# Patient Record
Sex: FEMALE | Race: WHITE | NOT HISPANIC OR LATINO | Employment: FULL TIME | URBAN - METROPOLITAN AREA
[De-identification: names, ages, dates, MRNs, and addresses within clinical notes are randomized per-mention and may not be internally consistent; named-entity substitution may affect disease eponyms.]

---

## 2018-04-24 ENCOUNTER — OFFICE VISIT (OUTPATIENT)
Dept: GASTROENTEROLOGY | Facility: CLINIC | Age: 48
End: 2018-04-24

## 2018-04-24 VITALS
WEIGHT: 118 LBS | SYSTOLIC BLOOD PRESSURE: 124 MMHG | BODY MASS INDEX: 21.71 KG/M2 | TEMPERATURE: 98.7 F | HEIGHT: 62 IN | DIASTOLIC BLOOD PRESSURE: 72 MMHG

## 2018-04-24 DIAGNOSIS — K58.0 IRRITABLE BOWEL SYNDROME WITH DIARRHEA: ICD-10-CM

## 2018-04-24 DIAGNOSIS — K52.9 CHRONIC DIARRHEA: Primary | ICD-10-CM

## 2018-04-24 PROCEDURE — 99203 OFFICE O/P NEW LOW 30 MIN: CPT | Performed by: INTERNAL MEDICINE

## 2018-04-24 RX ORDER — HYDROXYZINE HYDROCHLORIDE 25 MG/1
TABLET, FILM COATED ORAL
COMMUNITY
Start: 2018-03-12 | End: 2018-04-24

## 2018-04-24 RX ORDER — CETIRIZINE HYDROCHLORIDE 10 MG/1
10 TABLET ORAL DAILY
COMMUNITY
End: 2018-07-02

## 2018-04-24 RX ORDER — MELATONIN 200 MCG
TABLET ORAL
COMMUNITY
End: 2018-07-02

## 2018-04-24 RX ORDER — BACLOFEN 20 MG/1
10 TABLET ORAL NIGHTLY
COMMUNITY
Start: 2018-03-12 | End: 2018-08-16 | Stop reason: HOSPADM

## 2018-04-24 RX ORDER — CHLORAL HYDRATE 500 MG
CAPSULE ORAL
COMMUNITY
End: 2018-07-02

## 2018-04-24 RX ORDER — HYOSCYAMINE SULFATE 0.125 MG
0.12 TABLET ORAL EVERY 4 HOURS PRN
COMMUNITY
End: 2018-06-19 | Stop reason: HOSPADM

## 2018-04-24 NOTE — PROGRESS NOTES
Chief Complaint   Patient presents with   • Abdominal Pain   • Diarrhea   • Nausea     Nicki Fowler is a 47 y.o. female who presents with a History of chronic diarrhea   HPI     47-year-old female with history of seasonal allergies and diagnosis of IBS for 20 years.  Patient reports an first evaluated 20 years ago with chronic diarrhea patient had stool sent and when they turned out negative was told she had IBS.  Patient is been dealing with symptoms of gas, bloating urgency and diarrhea ever since.  Patient reports no specific diet seems to help.  Patient reports anything she eats may or may not cause the symptoms to occur to the point she can become incontinent when the response is extreme.  Patient will often have episodes of frequent urgency and will have to leave event that she's attending due to these symptoms.  Patient does raise 3 children and feels its time that she starts taking care of herself and came in today for evaluation.  Patient denies any significant weight loss though is thin at the start.    Past Medical History:   Diagnosis Date   • History of seasonal allergies        Current Outpatient Prescriptions:   •  baclofen (LIORESAL) 20 MG tablet, 10 mg., Disp: , Rfl:   •  Calcium-Magnesium-Vitamin D (CALCIUM MAGNESIUM PO), Take  by mouth., Disp: , Rfl:   •  cetirizine (zyrTEC) 10 MG tablet, Take 10 mg by mouth Daily., Disp: , Rfl:   •  FIBER PO, Take  by mouth., Disp: , Rfl:   •  hyoscyamine (ANASPAZ,LEVSIN) 0.125 MG tablet, Take 0.125 mg by mouth Every 4 (Four) Hours As Needed for Cramping., Disp: , Rfl:   •  Melatonin 3 MG tablet, Take  by mouth., Disp: , Rfl:   •  Omega-3 Fatty Acids (FISH OIL) 1000 MG capsule capsule, Take  by mouth Daily With Breakfast., Disp: , Rfl:   •  magnesium chloride ER (MAG-DELAY) 535 (64 Mg) MG CR tablet, Take  by mouth Daily., Disp: , Rfl:   •  rifaximin (XIFAXAN) 550 MG tablet, Take 1 tablet by mouth Every 8 (Eight) Hours., Disp: 42 tablet, Rfl: 2  Allergies    Allergen Reactions   • Shellfish-Derived Products Hives     Social History     Social History   • Marital status:      Spouse name: N/A   • Number of children: N/A   • Years of education: N/A     Occupational History   • Not on file.     Social History Main Topics   • Smoking status: Former Smoker     Packs/day: 2.00     Types: Cigarettes     Start date: 1983     Quit date: 2008   • Smokeless tobacco: Never Used   • Alcohol use Yes      Comment: social   • Drug use: No   • Sexual activity: Not on file     Other Topics Concern   • Not on file     Social History Narrative   • No narrative on file     No family history on file.  Review of Systems   Constitutional: Negative.    HENT: Negative.    Eyes: Negative.    Respiratory: Negative.    Cardiovascular: Negative.    Gastrointestinal: Positive for abdominal pain, diarrhea and nausea. Negative for abdominal distention, anal bleeding, blood in stool, constipation, rectal pain and vomiting.   Endocrine: Negative.    Musculoskeletal: Negative.    Allergic/Immunologic: Negative.    Hematological: Negative.      Vitals:    04/24/18 1141   BP: 124/72   Temp: 98.7 °F (37.1 °C)     Physical Exam   Constitutional: She is oriented to person, place, and time. She appears well-developed and well-nourished.   HENT:   Head: Normocephalic and atraumatic.   Eyes: Pupils are equal, round, and reactive to light. No scleral icterus.   Cardiovascular: Normal rate, regular rhythm and normal heart sounds.  Exam reveals no gallop and no friction rub.    No murmur heard.  Pulmonary/Chest: Effort normal and breath sounds normal. She has no wheezes. She has no rales.   Abdominal: Soft. Bowel sounds are normal. She exhibits no shifting dullness, no distension, no pulsatile liver, no fluid wave, no abdominal bruit, no ascites, no pulsatile midline mass and no mass. There is no hepatosplenomegaly. There is no tenderness. There is no rigidity and no guarding. No hernia.   Musculoskeletal:  Normal range of motion. She exhibits no edema.   Neurological: She is alert and oriented to person, place, and time. No cranial nerve deficit.   Skin: Skin is warm and dry. No rash noted.   Psychiatric: She has a normal mood and affect. Her behavior is normal. Thought content normal.   Nursing note and vitals reviewed.    Diagnoses and all orders for this visit:    Chronic diarrhea  -     Case Request; Standing  -     Case Request    Irritable bowel syndrome with diarrhea    Other orders  -     baclofen (LIORESAL) 20 MG tablet; 10 mg.  -     Discontinue: hydrOXYzine (ATARAX) 25 MG tablet;   -     hyoscyamine (ANASPAZ,LEVSIN) 0.125 MG tablet; Take 0.125 mg by mouth Every 4 (Four) Hours As Needed for Cramping.  -     cetirizine (zyrTEC) 10 MG tablet; Take 10 mg by mouth Daily.  -     FIBER PO; Take  by mouth.  -     Omega-3 Fatty Acids (FISH OIL) 1000 MG capsule capsule; Take  by mouth Daily With Breakfast.  -     magnesium chloride ER (MAG-DELAY) 535 (64 Mg) MG CR tablet; Take  by mouth Daily.  -     Melatonin 3 MG tablet; Take  by mouth.  -     Calcium-Magnesium-Vitamin D (CALCIUM MAGNESIUM PO); Take  by mouth.  -     Implement Anesthesia orders day of procedure.; Standing  -     Obtain informed consent; Standing  -     rifaximin (XIFAXAN) 550 MG tablet; Take 1 tablet by mouth Every 8 (Eight) Hours.    Patient 47-year-old female with history of chronic diarrhea for greater than 20 years.  Patient reports tested for stool for her chronic diarrhea 20 years ago and was told that was normal therefore she had IBS and is been dealing with the symptoms ever since.  Patient with urgency and loose to runny stools including rare incontinence.  Patient will occasionally gain some control with diet and probiotics but then ended up with accidents.  Patient now here for further recommendations.  At this point would recommend patient undergo colonoscopy for biopsies rule out microscopic colitis.  We'll also give trial Xifaxan to  see if that will resolve her symptoms for prolonged treatment.  If 14 days of therapy did not help will go to more traditional IBS therapy.  We'll follow-up clinically based on findings.

## 2018-04-30 ENCOUNTER — HOSPITAL ENCOUNTER (OUTPATIENT)
Facility: HOSPITAL | Age: 48
Setting detail: HOSPITAL OUTPATIENT SURGERY
Discharge: HOME | End: 2018-04-30
Attending: INTERNAL MEDICINE | Admitting: INTERNAL MEDICINE

## 2018-04-30 ENCOUNTER — ANESTHESIA (OUTPATIENT)
Dept: GASTROENTEROLOGY | Facility: HOSPITAL | Age: 48
End: 2018-04-30

## 2018-04-30 ENCOUNTER — ANESTHESIA EVENT (OUTPATIENT)
Dept: GASTROENTEROLOGY | Facility: HOSPITAL | Age: 48
End: 2018-04-30

## 2018-04-30 VITALS
WEIGHT: 117.25 LBS | HEART RATE: 56 BPM | OXYGEN SATURATION: 98 % | DIASTOLIC BLOOD PRESSURE: 71 MMHG | BODY MASS INDEX: 21.45 KG/M2 | TEMPERATURE: 97.7 F | RESPIRATION RATE: 16 BRPM | SYSTOLIC BLOOD PRESSURE: 109 MMHG

## 2018-04-30 DIAGNOSIS — K52.9 CHRONIC DIARRHEA: ICD-10-CM

## 2018-04-30 LAB
B-HCG UR QL: NEGATIVE
INTERNAL NEGATIVE CONTROL: NEGATIVE
INTERNAL POSITIVE CONTROL: POSITIVE
Lab: NORMAL

## 2018-04-30 PROCEDURE — 25010000002 PROPOFOL 10 MG/ML EMULSION: Performed by: NURSE ANESTHETIST, CERTIFIED REGISTERED

## 2018-04-30 PROCEDURE — 45380 COLONOSCOPY AND BIOPSY: CPT | Performed by: INTERNAL MEDICINE

## 2018-04-30 PROCEDURE — 88305 TISSUE EXAM BY PATHOLOGIST: CPT | Performed by: INTERNAL MEDICINE

## 2018-04-30 PROCEDURE — 45385 COLONOSCOPY W/LESION REMOVAL: CPT | Performed by: INTERNAL MEDICINE

## 2018-04-30 RX ORDER — HYDROXYZINE PAMOATE 25 MG/1
12.5 CAPSULE ORAL 3 TIMES DAILY PRN
COMMUNITY
End: 2018-07-02

## 2018-04-30 RX ORDER — SODIUM CHLORIDE, SODIUM LACTATE, POTASSIUM CHLORIDE, CALCIUM CHLORIDE 600; 310; 30; 20 MG/100ML; MG/100ML; MG/100ML; MG/100ML
1000 INJECTION, SOLUTION INTRAVENOUS CONTINUOUS
Status: DISCONTINUED | OUTPATIENT
Start: 2018-04-30 | End: 2018-04-30 | Stop reason: HOSPADM

## 2018-04-30 RX ORDER — LIDOCAINE HYDROCHLORIDE 10 MG/ML
INJECTION, SOLUTION INFILTRATION; PERINEURAL AS NEEDED
Status: DISCONTINUED | OUTPATIENT
Start: 2018-04-30 | End: 2018-04-30 | Stop reason: SURG

## 2018-04-30 RX ORDER — PROPOFOL 10 MG/ML
VIAL (ML) INTRAVENOUS AS NEEDED
Status: DISCONTINUED | OUTPATIENT
Start: 2018-04-30 | End: 2018-04-30 | Stop reason: SURG

## 2018-04-30 RX ORDER — PROPOFOL 10 MG/ML
VIAL (ML) INTRAVENOUS CONTINUOUS PRN
Status: DISCONTINUED | OUTPATIENT
Start: 2018-04-30 | End: 2018-04-30 | Stop reason: SURG

## 2018-04-30 RX ADMIN — SODIUM CHLORIDE, POTASSIUM CHLORIDE, SODIUM LACTATE AND CALCIUM CHLORIDE 1000 ML: 600; 310; 30; 20 INJECTION, SOLUTION INTRAVENOUS at 08:47

## 2018-04-30 RX ADMIN — PROPOFOL 80 MG: 10 INJECTION, EMULSION INTRAVENOUS at 09:41

## 2018-04-30 RX ADMIN — LIDOCAINE HYDROCHLORIDE 60 MG: 10 INJECTION, SOLUTION INFILTRATION; PERINEURAL at 09:41

## 2018-04-30 RX ADMIN — PROPOFOL 200 MCG/KG/MIN: 10 INJECTION, EMULSION INTRAVENOUS at 09:41

## 2018-04-30 NOTE — ANESTHESIA PREPROCEDURE EVALUATION
Anesthesia Evaluation     Patient summary reviewed and Nursing notes reviewed   NPO Solid Status: > 8 hours  NPO Liquid Status: > 8 hours           Airway   Mallampati: I  TM distance: >3 FB  Neck ROM: full  No difficulty expected  Dental - normal exam     Pulmonary - normal exam   Cardiovascular - normal exam        Neuro/Psych  GI/Hepatic/Renal/Endo      ROS Comment: IBS  diarrhea    Musculoskeletal     Abdominal  - normal exam    Bowel sounds: normal.   Substance History      OB/GYN          Other                        Anesthesia Plan    ASA 1     MAC     Anesthetic plan and risks discussed with patient.

## 2018-04-30 NOTE — ANESTHESIA POSTPROCEDURE EVALUATION
Patient: Nicki Fowler    Procedure Summary     Date:  04/30/18 Room / Location:  [facility] ENDOSCOPY 6 /  [facility]    Anesthesia Start:  0931 Anesthesia Stop:  1002    Procedure:  COLONOSCOPY to cecum and ti (N/A ) Diagnosis:       Chronic diarrhea      Colon polyp      External hemorrhoids without complication      (Chronic diarrhea [K52.9])    Surgeon:  Cristian Mares MD Provider:  Robyn Castro MD    Anesthesia Type:  MAC ASA Status:  1          Anesthesia Type: MAC  Last vitals  BP   109/71 (04/30/18 1022)   Temp   36.5 °C (97.7 °F) (04/30/18 0835)   Pulse   56 (04/30/18 1022)   Resp   16 (04/30/18 1022)     SpO2   98 % (04/30/18 1022)     Post Anesthesia Care and Evaluation    Patient location during evaluation: PACU  Patient participation: complete - patient participated  Level of consciousness: awake  Pain score: 0  Pain management: satisfactory to patient  Airway patency: patent  Anesthetic complications: No anesthetic complications    Cardiovascular status: acceptable  Respiratory status: acceptable  Hydration status: acceptable    Comments: Blood pressure 109/71, pulse 56, temperature 36.5 °C (97.7 °F), temperature source Oral, resp. rate 16, weight 53.2 kg (117 lb 4 oz), SpO2 98 %.    No anesthesia care post op

## 2018-05-01 LAB
CYTO UR: NORMAL
LAB AP CASE REPORT: NORMAL
Lab: NORMAL
PATH REPORT.FINAL DX SPEC: NORMAL
PATH REPORT.GROSS SPEC: NORMAL

## 2018-05-21 ENCOUNTER — OFFICE VISIT (OUTPATIENT)
Dept: NEUROSURGERY | Facility: CLINIC | Age: 48
End: 2018-05-21

## 2018-05-21 VITALS
HEIGHT: 62 IN | SYSTOLIC BLOOD PRESSURE: 100 MMHG | DIASTOLIC BLOOD PRESSURE: 60 MMHG | WEIGHT: 117 LBS | HEART RATE: 71 BPM | BODY MASS INDEX: 21.53 KG/M2

## 2018-05-21 DIAGNOSIS — M54.12 CERVICAL RADICULOPATHY: Primary | ICD-10-CM

## 2018-05-21 DIAGNOSIS — M54.16 LUMBAR RADICULOPATHY: ICD-10-CM

## 2018-05-21 PROCEDURE — 99203 OFFICE O/P NEW LOW 30 MIN: CPT | Performed by: PHYSICIAN ASSISTANT
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
## 2018-06-08 ENCOUNTER — HOSPITAL ENCOUNTER (OUTPATIENT)
Dept: GENERAL RADIOLOGY | Facility: HOSPITAL | Age: 48
Discharge: HOME | End: 2018-06-08

## 2018-06-08 ENCOUNTER — HOSPITAL ENCOUNTER (OUTPATIENT)
Dept: MRI IMAGING | Facility: HOSPITAL | Age: 48
Discharge: HOME | End: 2018-06-08
Admitting: PHYSICIAN ASSISTANT

## 2018-06-08 ENCOUNTER — HOSPITAL ENCOUNTER (OUTPATIENT)
Dept: MRI IMAGING | Facility: HOSPITAL | Age: 48
Discharge: HOME | End: 2018-06-08

## 2018-06-08 DIAGNOSIS — M54.16 LUMBAR RADICULOPATHY: ICD-10-CM

## 2018-06-08 DIAGNOSIS — M54.12 CERVICAL RADICULOPATHY: ICD-10-CM

## 2018-06-08 PROCEDURE — 72114 X-RAY EXAM L-S SPINE BENDING: CPT

## 2018-06-08 PROCEDURE — 72052 X-RAY EXAM NECK SPINE 6/>VWS: CPT

## 2018-06-08 PROCEDURE — 72148 MRI LUMBAR SPINE W/O DYE: CPT

## 2018-06-08 PROCEDURE — 72141 MRI NECK SPINE W/O DYE: CPT

## 2018-06-13 ENCOUNTER — TELEPHONE (OUTPATIENT)
Dept: GASTROENTEROLOGY | Facility: CLINIC | Age: 48
End: 2018-06-13

## 2018-06-13 ENCOUNTER — TELEPHONE (OUTPATIENT)
Dept: NEUROSURGERY | Facility: CLINIC | Age: 48
End: 2018-06-13

## 2018-06-13 NOTE — TELEPHONE ENCOUNTER
----- Message from Alexa Mathur PA-C sent at 6/12/2018  9:07 AM EDT -----  Gabriella-    Please make her follow up with one of the docs. She has severe canal stenosis.     Thanks, Alexa  ----- Message -----  From: Interface, Rad Results [place] In  Sent: 6/11/2018   5:05 PM  To: Alexa Mathur PA-C

## 2018-06-13 NOTE — TELEPHONE ENCOUNTER
----- Message from Rosina Brower sent at 6/13/2018  2:18 PM EDT -----  Regarding: PT CALLED - PATH RESULTS  Contact: [phone]  PT HAD PROCEDURE ON 04/30. STILL NO RESULTS. ALSO STATED SHE WAS ADVISED HER SCRIPT WOULD NEED A PA. POSSIBLY TALKING ABOUT XIFAXAN. PT STATED SHE HAS CALLED 3 TIMES. WITH NO CALL BACK. PLEASE CHECK WITH DR ENCINAS TOMORROW AND CALL PT.

## 2018-06-13 NOTE — TELEPHONE ENCOUNTER
Path of colonoscopy showed HP polyp but otherwise normal. Recall colonoscopy in 5 years. Can start xifaxan x 2 weeks for IBS-D/SIBO. With 2 refills. Thanks.

## 2018-06-13 NOTE — TELEPHONE ENCOUNTER
Patient called, advised as per Doris's note. Patient state her oop for Xifaxan is over $1000. Questions if there is another medication she can take?  Advised will send an  Update to Dr Mares for advisement. She verb understanding and is agreeable tot he plan.

## 2018-06-13 NOTE — TELEPHONE ENCOUNTER
Called pt she has requested to see Dr. Kendrick following MRI results. Follow up appointment made, patient notified.

## 2018-06-18 RX ORDER — NORTRIPTYLINE HYDROCHLORIDE 10 MG/1
10 CAPSULE ORAL NIGHTLY
Qty: 30 CAPSULE | Refills: 11 | Status: SHIPPED | OUTPATIENT
Start: 2018-06-18 | End: 2018-11-30 | Stop reason: SDUPTHER

## 2018-06-18 NOTE — TELEPHONE ENCOUNTER
Patient called, advised as per Dr. Mares's note. She verb understanding and will call back for an appointment.

## 2018-06-19 ENCOUNTER — OFFICE VISIT (OUTPATIENT)
Dept: NEUROSURGERY | Facility: CLINIC | Age: 48
End: 2018-06-19

## 2018-06-19 VITALS — SYSTOLIC BLOOD PRESSURE: 109 MMHG | HEART RATE: 74 BPM | DIASTOLIC BLOOD PRESSURE: 68 MMHG

## 2018-06-19 DIAGNOSIS — M54.12 CERVICAL RADICULOPATHY: Primary | ICD-10-CM

## 2018-06-19 PROCEDURE — 99213 OFFICE O/P EST LOW 20 MIN: CPT | Performed by: NEUROLOGICAL SURGERY

## 2018-06-19 RX ORDER — CEFAZOLIN SODIUM 2 G/100ML
2 INJECTION, SOLUTION INTRAVENOUS ONCE
Status: CANCELLED | OUTPATIENT
Start: 2018-07-11 | End: 2018-07-11

## 2018-06-19 NOTE — PROGRESS NOTES
Subjective   Patient ID: Nicki Fowler is a 47 y.o. female is here today for follow-up with new MRI and XR of Cervical and Lumbar that was ordered for neck and bilateral arm pain R>L and back pain that radiates into her right leg. She also states she has been having dizziness the last week and half.    History of Present Illness     This patient returns today.  She continues with pain primarily in her lower back and in her neck with radiation into both of her arms but worse on the right than the left.  The pain is sharp and stabbing and at times quite severe.  It is worse in the neck and the arms that in the lower back.  Most of the pain in the lower back is just in the back.    The following portions of the patient's history were reviewed and updated as appropriate: allergies, current medications, past family history, past medical history, past social history, past surgical history and problem list.    Review of Systems   Respiratory: Negative for chest tightness and shortness of breath.    Cardiovascular: Negative for chest pain.   Musculoskeletal: Positive for back pain and neck pain.        Bilateral arm pain R>L and Right leg pain   Neurological: Positive for dizziness and weakness.        Tingling   All other systems reviewed and are negative.      Objective   Physical Exam   Constitutional: She is oriented to person, place, and time. She appears well-developed and well-nourished.   Neurological: She is oriented to person, place, and time.     Neurologic Exam     Mental Status   Oriented to person, place, and time.       Assessment/Plan   Independent Review of Radiographic Studies:      I reviewed some plain films and MRIs of the cervical and lumbar spine.  I reviewed these films myself.  On the plain films of the lumbar spine there is some degenerative disease but not severe.  There is no evidence of abnormal movement on flexion and extension.  On the plain films of the cervical spine there is more  significant degenerative disease particularly at C5 6 and C6 7.  There is again no evidence of abnormal movement on flexion and extension films.  On the MRI of the lumbar spine there is no intrusion into the spinal canal at any level.  At L5-S1 the neural foramina are widely open as they are at L4 5.  At L4 5 however there does appear to be at least a question of a far lateral disc bulge on the left.  L3 4 is also fairly open as is L2 3 and L1 2.  Radiologist read it this way as well.  On the cervical MRI C2 3 is widely open as is C3 4.  C4 5 also shows fairly open foramina and canal but see 56 shows severe cervical stenosis with cord compression and complete obliteration of the foramina.  There is some narrowing at C6 7 as well but it is not severe..  C7-T1 looks okay.    Medical Decision Making:      I told the patient about the imaging.  I told her that based on the clinical symptoms and the imaging I do not think that the far lateral disc at L4 5 is causing much of a problem.  I also told her that the problem in the neck was more significant.  I told her that because of the cord pressure in the fact that she hasn't gotten any better over the last 10 years in spite of epidural blocks, physical therapy and time that surgery may be a decent option at this point.  I told the patient about the surgery which is called an anterior cervical discectomy.  I explained that there is an 80% chance of getting rid of the arm pain and any other arm symptoms.  I also explained that the patient would still have neck pain.  Initially this will be quite severe however it will improve with healing from the surgery.  There is also a risk of infection, bleeding, paralysis, and anesthetic risk.  There is a risk of damage to the soft tissues of the neck such as the esophagus, carotids, and trachea.  All of these risks are about 2 or 3%.  There is about a 5% chance of nonunion or failure of the instrumentation.  There is also a about a  5% chance of hoarseness and trouble swallowing do to damage to the recurrent laryngeal nerve.  We discussed the postoperative hospital and home course as well.  The patient does ask to proceed.    She will need to be scheduled for a: C5 6 anterior cervical discectomy, fusion and instrumentation    Nicki was seen today for neck pain and back pain.    Diagnoses and all orders for this visit:    Cervical radiculopathy  -     Case Request; Standing  -     ceFAZolin (ANCEF) 2 g in sodium chloride 0.9 % 100 mL IVPB; Infuse 2 g into a venous catheter 1 (One) Time.  -     Case Request    Other orders  -     Follow anesthesia standing orders.  -     Obtain informed consent  -     KRISTYN hose- To be placed on patient in pre-op; Standing  -     SCD (sequential compression device)- to be placed on patient in Pre-op; Standing      Return for 2-3 week post op.

## 2018-07-02 ENCOUNTER — APPOINTMENT (OUTPATIENT)
Dept: PREADMISSION TESTING | Facility: HOSPITAL | Age: 48
End: 2018-07-02

## 2018-07-02 VITALS
DIASTOLIC BLOOD PRESSURE: 73 MMHG | BODY MASS INDEX: 21.35 KG/M2 | RESPIRATION RATE: 20 BRPM | HEIGHT: 62 IN | OXYGEN SATURATION: 99 % | SYSTOLIC BLOOD PRESSURE: 104 MMHG | TEMPERATURE: 98.5 F | WEIGHT: 116 LBS | HEART RATE: 75 BPM

## 2018-07-02 LAB
ANION GAP SERPL CALCULATED.3IONS-SCNC: 9.3 MMOL/L
BUN BLD-MCNC: 13 MG/DL (ref 6–20)
BUN/CREAT SERPL: 16 (ref 7–25)
CALCIUM SPEC-SCNC: 8.8 MG/DL (ref 8.6–10.5)
CHLORIDE SERPL-SCNC: 102 MMOL/L (ref 98–107)
CO2 SERPL-SCNC: 28.7 MMOL/L (ref 22–29)
CREAT BLD-MCNC: 0.81 MG/DL (ref 0.57–1)
DEPRECATED RDW RBC AUTO: 45 FL (ref 37–54)
ERYTHROCYTE [DISTWIDTH] IN BLOOD BY AUTOMATED COUNT: 13.2 % (ref 11.7–13)
GFR SERPL CREATININE-BSD FRML MDRD: 76 ML/MIN/1.73
GLUCOSE BLD-MCNC: 85 MG/DL (ref 65–99)
HCT VFR BLD AUTO: 37.5 % (ref 35.6–45.5)
HGB BLD-MCNC: 12.1 G/DL (ref 11.9–15.5)
MCH RBC QN AUTO: 30 PG (ref 26.9–32)
MCHC RBC AUTO-ENTMCNC: 32.3 G/DL (ref 32.4–36.3)
MCV RBC AUTO: 92.8 FL (ref 80.5–98.2)
PLATELET # BLD AUTO: 192 10*3/MM3 (ref 140–500)
PMV BLD AUTO: 10.2 FL (ref 6–12)
POTASSIUM BLD-SCNC: 4 MMOL/L (ref 3.5–5.2)
RBC # BLD AUTO: 4.04 10*6/MM3 (ref 3.9–5.2)
SODIUM BLD-SCNC: 140 MMOL/L (ref 136–145)
WBC NRBC COR # BLD: 4.45 10*3/MM3 (ref 4.5–10.7)

## 2018-07-02 PROCEDURE — 36415 COLL VENOUS BLD VENIPUNCTURE: CPT

## 2018-07-02 PROCEDURE — 85027 COMPLETE CBC AUTOMATED: CPT | Performed by: NEUROLOGICAL SURGERY

## 2018-07-02 PROCEDURE — 80048 BASIC METABOLIC PNL TOTAL CA: CPT | Performed by: NEUROLOGICAL SURGERY

## 2018-07-02 NOTE — DISCHARGE INSTRUCTIONS
Take the following medications the morning of surgery with a small sip of water:  none        General Instructions:  • Do not eat solid food after midnight the night before surgery.  • You may drink clear liquids day of surgery but must stop at least one hour before your hospital arrival time.  • It is beneficial for you to have a clear drink that contains carbohydrates the day of surgery.  We suggest a 12 to 20 ounce bottle of Gatorade or Powerade for non-diabetic patients or a 12 to 20 ounce bottle of G2 or Powerade Zero for diabetic patients. (Pediatric patients, are not advised to drink a 12 to 20 ounce carbohydrate drink)    Clear liquids are liquids you can see through.  Nothing red in color.     Plain water                               Sports drinks  Sodas                                   Gelatin (Jell-O)  Fruit juices without pulp such as white grape juice and apple juice  Popsicles that contain no fruit or yogurt  Tea or coffee (no cream or milk added)  Gatorade / Powerade  G2 / Powerade Zero    • Infants may have breast milk up to four hours before surgery.  • Infants drinking formula may drink formula up to six hours before surgery.   • Patients who avoid smoking, chewing tobacco and alcohol for 4 weeks prior to surgery have a reduced risk of post-operative complications.  Quit smoking as many days before surgery as you can.  • Do not smoke, use chewing tobacco or drink alcohol the day of surgery.   • If applicable bring your C-PAP/ BI-PAP machine.  • Bring any papers given to you in the doctor’s office.  • Wear clean comfortable clothes and socks.  • Do not wear contact lenses or make-up.  Bring a case for your glasses.   • Bring crutches or walker if applicable.  • Remove all piercings.  Leave jewelry and any other valuables at home.  • Hair extensions with metal clips must be removed prior to surgery.  • The Pre-Admission Testing nurse will instruct you to bring medications if unable to obtain an  accurate list in Pre-Admission Testing.        If you were given a blood bank ID arm band remember to bring it with you the day of surgery.    Preventing a Surgical Site Infection:  • For 2 to 3 days before surgery, avoid shaving with a razor because the razor can irritate skin and make it easier to develop an infection.    • Any areas of open skin can increase the risk of a post-operative wound infection by allowing bacteria to enter and travel throughout the body.  Notify your surgeon if you have any skin wounds / rashes even if it is not near the expected surgical site.  The area will need assessed to determine if surgery should be delayed until it is healed.  • The night prior to surgery sleep in a clean bed with clean clothing.  Do not allow pets to sleep with you.  • Shower on the morning of surgery using a fresh bar of anti-bacterial soap (such as Dial) and clean washcloth.  Dry with a clean towel and dress in clean clothing.  • Ask your surgeon if you will be receiving antibiotics prior to surgery.  • Make sure you, your family, and all healthcare providers clean their hands with soap and water or an alcohol based hand  before caring for you or your wound.    Day of surgery:  Upon arrival, a Pre-op nurse and Anesthesiologist will review your health history, obtain vital signs, and answer questions you may have.  The only belongings needed at this time will be your home medications and if applicable your C-PAP/BI-PAP machine.  If you are staying overnight your family can leave the rest of your belongings in the car and bring them to your room later.  A Pre-op nurse will start an IV and you may receive medication in preparation for surgery, including something to help you relax.  Your family will be able to see you in the Pre-op area.  While you are in surgery your family should notify the waiting room  if they leave the waiting room area and provide a contact phone number.    Please be  aware that surgery does come with discomfort.  We want to make every effort to control your discomfort so please discuss any uncontrolled symptoms with your nurse.   Your doctor will most likely have prescribed pain medications.      If you are going home after surgery you will receive individualized written care instructions before being discharged.  A responsible adult must drive you to and from the hospital on the day of your surgery and stay with you for 24 hours.    If you are staying overnight following surgery, you will be transported to your hospital room following the recovery period.  [facility] has all private rooms.    You have received a list of surgical assistants for your reference.  If you have any questions please call Pre-Admission Testing at [phone].  Deductibles and co-payments are collected on the day of service. Please be prepared to pay the required co-pay, deductible or deposit on the day of service as defined by your plan.

## 2018-07-11 ENCOUNTER — APPOINTMENT (OUTPATIENT)
Dept: GENERAL RADIOLOGY | Facility: HOSPITAL | Age: 48
End: 2018-07-11

## 2018-07-11 ENCOUNTER — ANESTHESIA (OUTPATIENT)
Dept: PERIOP | Facility: HOSPITAL | Age: 48
End: 2018-07-11

## 2018-07-11 ENCOUNTER — ANESTHESIA EVENT (OUTPATIENT)
Dept: PERIOP | Facility: HOSPITAL | Age: 48
End: 2018-07-11

## 2018-07-11 ENCOUNTER — HOSPITAL ENCOUNTER (OUTPATIENT)
Facility: HOSPITAL | Age: 48
Discharge: HOME | End: 2018-07-12
Attending: NEUROLOGICAL SURGERY | Admitting: NEUROLOGICAL SURGERY

## 2018-07-11 DIAGNOSIS — M54.12 CERVICAL RADICULOPATHY: ICD-10-CM

## 2018-07-11 PROCEDURE — 76000 FLUOROSCOPY <1 HR PHYS/QHP: CPT

## 2018-07-11 PROCEDURE — 22853 INSJ BIOMECHANICAL DEVICE: CPT | Performed by: NEUROLOGICAL SURGERY

## 2018-07-11 PROCEDURE — 25010000002 MORPHINE PER 10 MG: Performed by: NEUROLOGICAL SURGERY

## 2018-07-11 PROCEDURE — 25010000002 ONDANSETRON PER 1 MG: Performed by: NURSE ANESTHETIST, CERTIFIED REGISTERED

## 2018-07-11 PROCEDURE — C1713 ANCHOR/SCREW BN/BN,TIS/BN: HCPCS | Performed by: NEUROLOGICAL SURGERY

## 2018-07-11 PROCEDURE — 25810000003 SODIUM CHLORIDE 0.9 % WITH KCL 20 MEQ 20-0.9 MEQ/L-% SOLUTION: Performed by: NEUROLOGICAL SURGERY

## 2018-07-11 PROCEDURE — 25010000002 MIDAZOLAM PER 1 MG: Performed by: ANESTHESIOLOGY

## 2018-07-11 PROCEDURE — 72040 X-RAY EXAM NECK SPINE 2-3 VW: CPT

## 2018-07-11 PROCEDURE — 25010000003 CEFAZOLIN IN DEXTROSE 2-4 GM/100ML-% SOLUTION: Performed by: NEUROLOGICAL SURGERY

## 2018-07-11 PROCEDURE — L0120 CERV FLEX N/ADJ FOAM PRE OTS: HCPCS | Performed by: NEUROLOGICAL SURGERY

## 2018-07-11 PROCEDURE — 25010000002 FENTANYL CITRATE (PF) 100 MCG/2ML SOLUTION: Performed by: NURSE ANESTHETIST, CERTIFIED REGISTERED

## 2018-07-11 PROCEDURE — 25010000002 DEXAMETHASONE PER 1 MG: Performed by: NURSE ANESTHETIST, CERTIFIED REGISTERED

## 2018-07-11 PROCEDURE — 22845 INSERT SPINE FIXATION DEVICE: CPT | Performed by: NEUROLOGICAL SURGERY

## 2018-07-11 PROCEDURE — 81025 URINE PREGNANCY TEST: CPT | Performed by: NEUROLOGICAL SURGERY

## 2018-07-11 PROCEDURE — 25010000002 PROPOFOL 10 MG/ML EMULSION: Performed by: NURSE ANESTHETIST, CERTIFIED REGISTERED

## 2018-07-11 PROCEDURE — 22551 ARTHRD ANT NTRBDY CERVICAL: CPT | Performed by: NEUROLOGICAL SURGERY

## 2018-07-11 PROCEDURE — 25010000002 HYDROMORPHONE PER 4 MG: Performed by: NURSE ANESTHETIST, CERTIFIED REGISTERED

## 2018-07-11 DEVICE — SCREW 3120514 4.0 X 14 SELF DRILL VAR
Type: IMPLANTABLE DEVICE | Site: SPINE CERVICAL | Status: FUNCTIONAL
Brand: ATLANTIS® ANTERIOR CERVICAL PLATE SYSTEM

## 2018-07-11 DEVICE — IMPLANT 6240864 ANATOMIC 16X14X8MM
Type: IMPLANTABLE DEVICE | Site: SPINE CERVICAL | Status: FUNCTIONAL
Brand: VERTE-STACK® SPINAL SYSTEM

## 2018-07-11 DEVICE — PUTTY DBF GRAFTON 3CC: Type: IMPLANTABLE DEVICE | Site: SPINE CERVICAL | Status: FUNCTIONAL

## 2018-07-11 RX ORDER — EPHEDRINE SULFATE 50 MG/ML
INJECTION, SOLUTION INTRAVENOUS AS NEEDED
Status: DISCONTINUED | OUTPATIENT
Start: 2018-07-11 | End: 2018-07-11 | Stop reason: SURG

## 2018-07-11 RX ORDER — ONDANSETRON 2 MG/ML
4 INJECTION INTRAMUSCULAR; INTRAVENOUS ONCE AS NEEDED
Status: DISCONTINUED | OUTPATIENT
Start: 2018-07-11 | End: 2018-07-11

## 2018-07-11 RX ORDER — FENTANYL CITRATE 50 UG/ML
INJECTION, SOLUTION INTRAMUSCULAR; INTRAVENOUS AS NEEDED
Status: DISCONTINUED | OUTPATIENT
Start: 2018-07-11 | End: 2018-07-11 | Stop reason: SURG

## 2018-07-11 RX ORDER — NORTRIPTYLINE HYDROCHLORIDE 10 MG/1
10 CAPSULE ORAL NIGHTLY
Status: DISCONTINUED | OUTPATIENT
Start: 2018-07-11 | End: 2018-07-12 | Stop reason: HOSPADM

## 2018-07-11 RX ORDER — LIDOCAINE HYDROCHLORIDE 10 MG/ML
0.5 INJECTION, SOLUTION EPIDURAL; INFILTRATION; INTRACAUDAL; PERINEURAL ONCE AS NEEDED
Status: DISCONTINUED | OUTPATIENT
Start: 2018-07-11 | End: 2018-07-11 | Stop reason: HOSPADM

## 2018-07-11 RX ORDER — EPHEDRINE SULFATE 50 MG/ML
5 INJECTION, SOLUTION INTRAVENOUS ONCE AS NEEDED
Status: DISCONTINUED | OUTPATIENT
Start: 2018-07-11 | End: 2018-07-11

## 2018-07-11 RX ORDER — PROMETHAZINE HYDROCHLORIDE 25 MG/ML
12.5 INJECTION, SOLUTION INTRAMUSCULAR; INTRAVENOUS ONCE AS NEEDED
Status: DISCONTINUED | OUTPATIENT
Start: 2018-07-11 | End: 2018-07-11

## 2018-07-11 RX ORDER — NALOXONE HCL 0.4 MG/ML
0.4 VIAL (ML) INJECTION
Status: DISCONTINUED | OUTPATIENT
Start: 2018-07-11 | End: 2018-07-12 | Stop reason: HOSPADM

## 2018-07-11 RX ORDER — SODIUM CHLORIDE 0.9 % (FLUSH) 0.9 %
1-10 SYRINGE (ML) INJECTION AS NEEDED
Status: DISCONTINUED | OUTPATIENT
Start: 2018-07-11 | End: 2018-07-12 | Stop reason: HOSPADM

## 2018-07-11 RX ORDER — HYDROCODONE BITARTRATE AND ACETAMINOPHEN 7.5; 325 MG/1; MG/1
1 TABLET ORAL ONCE AS NEEDED
Status: DISCONTINUED | OUTPATIENT
Start: 2018-07-11 | End: 2018-07-11

## 2018-07-11 RX ORDER — DIPHENHYDRAMINE HYDROCHLORIDE 50 MG/ML
12.5 INJECTION INTRAMUSCULAR; INTRAVENOUS
Status: DISCONTINUED | OUTPATIENT
Start: 2018-07-11 | End: 2018-07-11

## 2018-07-11 RX ORDER — MIDAZOLAM HYDROCHLORIDE 1 MG/ML
1 INJECTION INTRAMUSCULAR; INTRAVENOUS
Status: DISCONTINUED | OUTPATIENT
Start: 2018-07-11 | End: 2018-07-11

## 2018-07-11 RX ORDER — HYDROMORPHONE HYDROCHLORIDE 1 MG/ML
0.5 INJECTION, SOLUTION INTRAMUSCULAR; INTRAVENOUS; SUBCUTANEOUS
Status: DISCONTINUED | OUTPATIENT
Start: 2018-07-11 | End: 2018-07-11

## 2018-07-11 RX ORDER — CEFAZOLIN SODIUM 2 G/100ML
2 INJECTION, SOLUTION INTRAVENOUS EVERY 8 HOURS
Status: DISCONTINUED | OUTPATIENT
Start: 2018-07-11 | End: 2018-07-12 | Stop reason: HOSPADM

## 2018-07-11 RX ORDER — PROMETHAZINE HYDROCHLORIDE 25 MG/1
12.5 TABLET ORAL EVERY 6 HOURS PRN
Status: DISCONTINUED | OUTPATIENT
Start: 2018-07-11 | End: 2018-07-12 | Stop reason: HOSPADM

## 2018-07-11 RX ORDER — MIDAZOLAM HYDROCHLORIDE 1 MG/ML
2 INJECTION INTRAMUSCULAR; INTRAVENOUS
Status: DISCONTINUED | OUTPATIENT
Start: 2018-07-11 | End: 2018-07-11 | Stop reason: HOSPADM

## 2018-07-11 RX ORDER — OXYCODONE AND ACETAMINOPHEN 7.5; 325 MG/1; MG/1
1 TABLET ORAL ONCE AS NEEDED
Status: DISCONTINUED | OUTPATIENT
Start: 2018-07-11 | End: 2018-07-11

## 2018-07-11 RX ORDER — SCOLOPAMINE TRANSDERMAL SYSTEM 1 MG/1
1 PATCH, EXTENDED RELEASE TRANSDERMAL ONCE
Status: DISCONTINUED | OUTPATIENT
Start: 2018-07-11 | End: 2018-07-12

## 2018-07-11 RX ORDER — SODIUM CHLORIDE 0.9 % (FLUSH) 0.9 %
1-10 SYRINGE (ML) INJECTION AS NEEDED
Status: DISCONTINUED | OUTPATIENT
Start: 2018-07-11 | End: 2018-07-11 | Stop reason: HOSPADM

## 2018-07-11 RX ORDER — FENTANYL CITRATE 50 UG/ML
50 INJECTION, SOLUTION INTRAMUSCULAR; INTRAVENOUS
Status: DISCONTINUED | OUTPATIENT
Start: 2018-07-11 | End: 2018-07-11 | Stop reason: HOSPADM

## 2018-07-11 RX ORDER — MIDAZOLAM HYDROCHLORIDE 1 MG/ML
1 INJECTION INTRAMUSCULAR; INTRAVENOUS
Status: DISCONTINUED | OUTPATIENT
Start: 2018-07-11 | End: 2018-07-11 | Stop reason: HOSPADM

## 2018-07-11 RX ORDER — SODIUM CHLORIDE 9 MG/ML
INJECTION, SOLUTION INTRAVENOUS AS NEEDED
Status: DISCONTINUED | OUTPATIENT
Start: 2018-07-11 | End: 2018-07-11 | Stop reason: HOSPADM

## 2018-07-11 RX ORDER — ONDANSETRON 4 MG/1
4 TABLET, ORALLY DISINTEGRATING ORAL EVERY 6 HOURS PRN
Status: DISCONTINUED | OUTPATIENT
Start: 2018-07-11 | End: 2018-07-12 | Stop reason: HOSPADM

## 2018-07-11 RX ORDER — LIDOCAINE HYDROCHLORIDE 20 MG/ML
INJECTION, SOLUTION INFILTRATION; PERINEURAL AS NEEDED
Status: DISCONTINUED | OUTPATIENT
Start: 2018-07-11 | End: 2018-07-11 | Stop reason: SURG

## 2018-07-11 RX ORDER — DEXAMETHASONE SODIUM PHOSPHATE 4 MG/ML
INJECTION, SOLUTION INTRA-ARTICULAR; INTRALESIONAL; INTRAMUSCULAR; INTRAVENOUS; SOFT TISSUE AS NEEDED
Status: DISCONTINUED | OUTPATIENT
Start: 2018-07-11 | End: 2018-07-11 | Stop reason: SURG

## 2018-07-11 RX ORDER — PROPOFOL 10 MG/ML
VIAL (ML) INTRAVENOUS AS NEEDED
Status: DISCONTINUED | OUTPATIENT
Start: 2018-07-11 | End: 2018-07-11 | Stop reason: SURG

## 2018-07-11 RX ORDER — PROMETHAZINE HYDROCHLORIDE 25 MG/1
25 TABLET ORAL ONCE AS NEEDED
Status: DISCONTINUED | OUTPATIENT
Start: 2018-07-11 | End: 2018-07-11

## 2018-07-11 RX ORDER — MORPHINE SULFATE 2 MG/ML
2 INJECTION, SOLUTION INTRAMUSCULAR; INTRAVENOUS EVERY 4 HOURS PRN
Status: DISCONTINUED | OUTPATIENT
Start: 2018-07-11 | End: 2018-07-12 | Stop reason: HOSPADM

## 2018-07-11 RX ORDER — PROMETHAZINE HYDROCHLORIDE 25 MG/1
12.5 TABLET ORAL ONCE AS NEEDED
Status: DISCONTINUED | OUTPATIENT
Start: 2018-07-11 | End: 2018-07-11

## 2018-07-11 RX ORDER — FLUMAZENIL 0.1 MG/ML
0.2 INJECTION INTRAVENOUS AS NEEDED
Status: DISCONTINUED | OUTPATIENT
Start: 2018-07-11 | End: 2018-07-11

## 2018-07-11 RX ORDER — FAMOTIDINE 10 MG/ML
20 INJECTION, SOLUTION INTRAVENOUS ONCE
Status: COMPLETED | OUTPATIENT
Start: 2018-07-11 | End: 2018-07-11

## 2018-07-11 RX ORDER — MEPERIDINE HYDROCHLORIDE 25 MG/ML
12.5 INJECTION INTRAMUSCULAR; INTRAVENOUS; SUBCUTANEOUS
Status: DISCONTINUED | OUTPATIENT
Start: 2018-07-11 | End: 2018-07-11

## 2018-07-11 RX ORDER — FENTANYL CITRATE 50 UG/ML
50 INJECTION, SOLUTION INTRAMUSCULAR; INTRAVENOUS
Status: DISCONTINUED | OUTPATIENT
Start: 2018-07-11 | End: 2018-07-11

## 2018-07-11 RX ORDER — ONDANSETRON 2 MG/ML
INJECTION INTRAMUSCULAR; INTRAVENOUS AS NEEDED
Status: DISCONTINUED | OUTPATIENT
Start: 2018-07-11 | End: 2018-07-11 | Stop reason: SURG

## 2018-07-11 RX ORDER — PROMETHAZINE HYDROCHLORIDE 25 MG/1
25 SUPPOSITORY RECTAL ONCE AS NEEDED
Status: DISCONTINUED | OUTPATIENT
Start: 2018-07-11 | End: 2018-07-11

## 2018-07-11 RX ORDER — ROCURONIUM BROMIDE 10 MG/ML
INJECTION, SOLUTION INTRAVENOUS AS NEEDED
Status: DISCONTINUED | OUTPATIENT
Start: 2018-07-11 | End: 2018-07-11 | Stop reason: SURG

## 2018-07-11 RX ORDER — CEFAZOLIN SODIUM 2 G/100ML
2 INJECTION, SOLUTION INTRAVENOUS ONCE
Status: COMPLETED | OUTPATIENT
Start: 2018-07-11 | End: 2018-07-11

## 2018-07-11 RX ORDER — HYDROMORPHONE HCL 110MG/55ML
PATIENT CONTROLLED ANALGESIA SYRINGE INTRAVENOUS AS NEEDED
Status: DISCONTINUED | OUTPATIENT
Start: 2018-07-11 | End: 2018-07-11 | Stop reason: SURG

## 2018-07-11 RX ORDER — NALOXONE HCL 0.4 MG/ML
0.2 VIAL (ML) INJECTION AS NEEDED
Status: DISCONTINUED | OUTPATIENT
Start: 2018-07-11 | End: 2018-07-11

## 2018-07-11 RX ORDER — SODIUM CHLORIDE AND POTASSIUM CHLORIDE 150; 900 MG/100ML; MG/100ML
100 INJECTION, SOLUTION INTRAVENOUS CONTINUOUS
Status: DISCONTINUED | OUTPATIENT
Start: 2018-07-11 | End: 2018-07-12 | Stop reason: HOSPADM

## 2018-07-11 RX ORDER — ONDANSETRON 4 MG/1
4 TABLET, FILM COATED ORAL EVERY 6 HOURS PRN
Status: DISCONTINUED | OUTPATIENT
Start: 2018-07-11 | End: 2018-07-12 | Stop reason: HOSPADM

## 2018-07-11 RX ORDER — SODIUM CHLORIDE, SODIUM LACTATE, POTASSIUM CHLORIDE, CALCIUM CHLORIDE 600; 310; 30; 20 MG/100ML; MG/100ML; MG/100ML; MG/100ML
9 INJECTION, SOLUTION INTRAVENOUS CONTINUOUS
Status: DISCONTINUED | OUTPATIENT
Start: 2018-07-11 | End: 2018-07-11

## 2018-07-11 RX ORDER — ONDANSETRON 2 MG/ML
4 INJECTION INTRAMUSCULAR; INTRAVENOUS EVERY 6 HOURS PRN
Status: DISCONTINUED | OUTPATIENT
Start: 2018-07-11 | End: 2018-07-12 | Stop reason: HOSPADM

## 2018-07-11 RX ORDER — HYDROCODONE BITARTRATE AND ACETAMINOPHEN 5; 325 MG/1; MG/1
1 TABLET ORAL EVERY 4 HOURS PRN
Status: DISCONTINUED | OUTPATIENT
Start: 2018-07-11 | End: 2018-07-12 | Stop reason: HOSPADM

## 2018-07-11 RX ORDER — LABETALOL HYDROCHLORIDE 5 MG/ML
5 INJECTION, SOLUTION INTRAVENOUS
Status: DISCONTINUED | OUTPATIENT
Start: 2018-07-11 | End: 2018-07-11

## 2018-07-11 RX ADMIN — SCOPOLAMINE 1 PATCH: 1 PATCH, EXTENDED RELEASE TRANSDERMAL at 09:31

## 2018-07-11 RX ADMIN — SODIUM CHLORIDE, POTASSIUM CHLORIDE, SODIUM LACTATE AND CALCIUM CHLORIDE: 600; 310; 30; 20 INJECTION, SOLUTION INTRAVENOUS at 12:10

## 2018-07-11 RX ADMIN — PROPOFOL 150 MG: 10 INJECTION, EMULSION INTRAVENOUS at 10:54

## 2018-07-11 RX ADMIN — HYDROCODONE BITARTRATE AND ACETAMINOPHEN 1 TABLET: 5; 325 TABLET ORAL at 20:44

## 2018-07-11 RX ADMIN — SUGAMMADEX 200 MG: 100 INJECTION, SOLUTION INTRAVENOUS at 12:11

## 2018-07-11 RX ADMIN — ROCURONIUM BROMIDE 30 MG: 10 INJECTION INTRAVENOUS at 10:54

## 2018-07-11 RX ADMIN — MIDAZOLAM HYDROCHLORIDE 2 MG: 2 INJECTION, SOLUTION INTRAMUSCULAR; INTRAVENOUS at 09:31

## 2018-07-11 RX ADMIN — NORTRIPTYLINE HYDROCHLORIDE 10 MG: 10 CAPSULE ORAL at 20:41

## 2018-07-11 RX ADMIN — FENTANYL CITRATE 50 MCG: 50 INJECTION, SOLUTION INTRAMUSCULAR; INTRAVENOUS at 12:44

## 2018-07-11 RX ADMIN — FENTANYL CITRATE 50 MCG: 50 INJECTION, SOLUTION INTRAMUSCULAR; INTRAVENOUS at 13:06

## 2018-07-11 RX ADMIN — HYDROCODONE BITARTRATE AND ACETAMINOPHEN 1 TABLET: 5; 325 TABLET ORAL at 16:33

## 2018-07-11 RX ADMIN — CEFAZOLIN SODIUM 2 G: 2 INJECTION, SOLUTION INTRAVENOUS at 10:41

## 2018-07-11 RX ADMIN — LIDOCAINE HYDROCHLORIDE 100 MG: 20 INJECTION, SOLUTION INFILTRATION; PERINEURAL at 10:54

## 2018-07-11 RX ADMIN — CEFAZOLIN SODIUM 2 G: 2 INJECTION, SOLUTION INTRAVENOUS at 20:11

## 2018-07-11 RX ADMIN — SODIUM CHLORIDE, POTASSIUM CHLORIDE, SODIUM LACTATE AND CALCIUM CHLORIDE 9 ML/HR: 600; 310; 30; 20 INJECTION, SOLUTION INTRAVENOUS at 08:50

## 2018-07-11 RX ADMIN — EPHEDRINE SULFATE 10 MG: 50 INJECTION INTRAMUSCULAR; INTRAVENOUS; SUBCUTANEOUS at 11:59

## 2018-07-11 RX ADMIN — POTASSIUM CHLORIDE AND SODIUM CHLORIDE 100 ML/HR: 900; 150 INJECTION, SOLUTION INTRAVENOUS at 18:51

## 2018-07-11 RX ADMIN — HYDROMORPHONE HYDROCHLORIDE 0.5 MG: 2 INJECTION INTRAMUSCULAR; INTRAVENOUS; SUBCUTANEOUS at 11:22

## 2018-07-11 RX ADMIN — HYDROMORPHONE HYDROCHLORIDE 0.5 MG: 2 INJECTION INTRAMUSCULAR; INTRAVENOUS; SUBCUTANEOUS at 11:24

## 2018-07-11 RX ADMIN — EPHEDRINE SULFATE 10 MG: 50 INJECTION INTRAMUSCULAR; INTRAVENOUS; SUBCUTANEOUS at 12:02

## 2018-07-11 RX ADMIN — FAMOTIDINE 20 MG: 10 INJECTION, SOLUTION INTRAVENOUS at 09:31

## 2018-07-11 RX ADMIN — DEXAMETHASONE SODIUM PHOSPHATE 8 MG: 4 INJECTION INTRA-ARTICULAR; INTRALESIONAL; INTRAMUSCULAR; INTRAVENOUS; SOFT TISSUE at 11:08

## 2018-07-11 RX ADMIN — FENTANYL CITRATE 100 MCG: 50 INJECTION INTRAMUSCULAR; INTRAVENOUS at 10:49

## 2018-07-11 RX ADMIN — PROPOFOL 50 MG: 10 INJECTION, EMULSION INTRAVENOUS at 11:28

## 2018-07-11 RX ADMIN — MORPHINE SULFATE 2 MG: 2 INJECTION, SOLUTION INTRAMUSCULAR; INTRAVENOUS at 14:59

## 2018-07-11 RX ADMIN — ONDANSETRON 4 MG: 2 INJECTION INTRAMUSCULAR; INTRAVENOUS at 12:10

## 2018-07-11 NOTE — ANESTHESIA POSTPROCEDURE EVALUATION
"Patient: Nicki Fowler    Procedure Summary     Date:  07/11/18 Room / Location:  [facility] OR  / [facility] MAIN OR    Anesthesia Start:  1041 Anesthesia Stop:  1236    Procedure:  C5-6 ANTERIOR CERVICAL DISCECTOMY FUSION WITH INSTRUMENTATION (N/A Spine Cervical) Diagnosis:       Cervical radiculopathy      (Cervical radiculopathy [M54.12])    Surgeon:  Porter Kendrick MD Provider:  Hilary Black MD    Anesthesia Type:  general ASA Status:  2          Anesthesia Type: general  Last vitals  BP   125/76 (07/11/18 1345)   Temp   36.6 °C (97.8 °F) (07/11/18 1232)   Pulse   82 (07/11/18 1315)   Resp   16 (07/11/18 1345)     SpO2   100 % (07/11/18 1315)     Post Anesthesia Care and Evaluation    Patient location during evaluation: PACU  Patient participation: complete - patient participated  Level of consciousness: awake and alert  Pain score: 0  Pain management: adequate  Airway patency: patent  Anesthetic complications: No anesthetic complications    Cardiovascular status: acceptable  Respiratory status: acceptable  Hydration status: acceptable    Comments: /76   Pulse 82   Temp 36.6 °C (97.8 °F) (Oral)   Resp 16   Ht 157.5 cm (62.01\")   Wt 51.3 kg (113 lb)   LMP 06/27/2018 Comment: URINE HCG NEGATIVE 07/11/18  SpO2 100%   BMI 20.66 kg/m²       "

## 2018-07-11 NOTE — PLAN OF CARE
Problem: Patient Care Overview  Goal: Plan of Care Review  Outcome: Ongoing (interventions implemented as appropriate)   07/11/18 9539   Coping/Psychosocial   Plan of Care Reviewed With patient;spouse   Plan of Care Review   Progress improving   OTHER   Outcome Summary Pt AOx4; VSS; Pt ambulated to bathroom x1 assist; Pt reports numbness to BUE with occasional tingling. Drsg to neck Clean/dry/intact. Pain conrtrolled with currented regimine. Will continue to monitor..     Goal: Individualization and Mutuality  Outcome: Ongoing (interventions implemented as appropriate)    Goal: Interprofessional Rounds/Family Conf  Outcome: Ongoing (interventions implemented as appropriate)      Problem: Pain, Acute (Adult)  Goal: Identify Related Risk Factors and Signs and Symptoms  Outcome: Ongoing (interventions implemented as appropriate)    Goal: Acceptable Pain Control/Comfort Level  Outcome: Ongoing (interventions implemented as appropriate)      Problem: Fall Risk (Adult)  Goal: Identify Related Risk Factors and Signs and Symptoms  Outcome: Ongoing (interventions implemented as appropriate)    Goal: Absence of Fall  Outcome: Ongoing (interventions implemented as appropriate)

## 2018-07-11 NOTE — ANESTHESIA PREPROCEDURE EVALUATION
Anesthesia Evaluation     Patient summary reviewed and Nursing notes reviewed   NPO Solid Status: > 8 hours  NPO Liquid Status: > 2 hours           Airway   Mallampati: I  TM distance: >3 FB  Neck ROM: limited  No difficulty expected  Dental      Pulmonary - normal exam   Cardiovascular - normal exam  Exercise tolerance: excellent (>7 METS)        Neuro/Psych  (+) headaches, dizziness/light headedness, numbness (lumbar, peripheral,  cervical), psychiatric history Anxiety and Depression,     GI/Hepatic/Renal/Endo      Musculoskeletal     (+) back pain, radiculopathy  Abdominal    Substance History      OB/GYN          Other   (+) arthritis                     Anesthesia Plan    ASA 2     general   (Scop patch  )  intravenous induction   Anesthetic plan and risks discussed with patient.

## 2018-07-11 NOTE — BRIEF OP NOTE
CERVICAL DISCECTOMY ANTERIOR FUSION WITH INSTRUMENTATION  Progress Note    Nicki Fowler  7/11/2018    Pre-op Diagnosis:   Cervical radiculopathy [M54.12]       Post-Op Diagnosis Codes:     * Cervical radiculopathy [M54.12]    Procedure/CPT® Codes:      Procedure(s):  C5-6 ANTERIOR CERVICAL DISCECTOMY FUSION WITH INSTRUMENTATION    Surgeon(s):  Porter Kendrick MD    Anesthesia: General    Staff:   Circulator: Zoey Penn RN; Marleni oCbb RN  Radiology Technologist: Liat Aldana RRT  Scrub Person: Andrea Farrell  Vendor Representative: Ran Mahoney  Assistant: Rosemary Nascimento CSA    Estimated Blood Loss: 120 mL    Urine Voided: * No values recorded between 7/11/2018 10:40 AM and 7/11/2018 12:30 PM *    Specimens:                None      Drains:   Closed/Suction Drain Right;Anterior Neck Bulb 10 Fr. (Active)       Findings: stenosis    Complications: none      Porter Kendrick MD     Date: 7/11/2018  Time: 12:48 PM

## 2018-07-11 NOTE — OP NOTE
Preoperative diagnosis: Cervical stenosis with cervical myelopathy C5 6    Postoperative diagnosis: Same    Procedure performed: Anterior cervical discectomy C5 6 with anterior cervical fusion using a peek cage and a 25 mm anterior cervical plate    Surgeon: Porter Kendrick M.D.    Assistant: Rosemary Nascimento CFA who was instrumental in helping with hemostasis, visualization of neural structures, and retraction of neural structures.    Indications for the procedure: This patient was having severe symptoms in the neck and arms.   Imaging showed significant neural compressionin the cervical spine at C5 6.    Operative summary: After induction of general anesthesia with intravenous agents patient was intubated and placed on the operating table in the supine position.  The head was hyperextended on donut roll and a roll of sheets was placed under the shoulders.  Because of the stenosis I was careful not to hyperextend the head very much but only enough to allow good access to her neck.  The shoulders were taped down being careful not to stretch the brachial plexus too much.  All peripheral points of entrapment and pressure were padded and secured.   The neck was prepped with Chloraprep.    An incision was made in the right lower portion of the neck.  This was carried down to the platysma.  The platysma was opened in the direction of its fibers.  Dissection was carried down through the middle cervical fascia to the anterior aspect of the cervical spine.  A needle was placed in the first available disc space.  This did prove to be C6 7.  Attention was turned to the next level of.  The longus colli muscles were elevated off both sides of the anterior cervical spine and then the Cloward retractors were locked under the longus colli muscles and used to hold the esophagus, trachea, and recurrent laryngeal bundle medially and the carotid bundle and its contents laterally.  The disc space at the C5 6 level was incised and disc  material was removed with the 0 and 3-0 up-biting and straight curettes. The pituitary was also used to remove disc material.  Following this the posterior lip of the vertebral body was drilled off in combination with the uncovertebral joints on each side.    The posterior longitudinal ligament was then opened and removed from foramen to foramen completely decompressing the spinal cord and the nerve roots.  Once the nerve roots were visualized in each neural foramen and found to be completely decompressed bleeding was controlled with a combination of the bipolar cautery, FloSeal, and thrombin and Gelfoam.  Once the bleeding was stopped the disc space was sized and a 7 mm cornerstone PEEK cage was placed into the disc space.  It was filled first with [place] putty. The compression at this level was primarily due to stenosis.    A 25 mm Atlantis plate was then attached to the front of the cervical spine using 4 14 mm screws.    The retractors were removed and final x-rays taken. Bleeding was controlled with the bipolar cautery and a drain was placed in the anterior cervical space and tunneled subcutaneously. It was then sewn into place and the incision was closed in layers, dressed and the patient was taken to the recovery room in stable condition.  Sponge instrument and needle counts were correct at the end of the procedure.

## 2018-07-11 NOTE — ANESTHESIA PROCEDURE NOTES
Airway  Urgency: elective    Date/Time: 7/11/2018 10:56 AM  Airway not difficult    General Information and Staff    Patient location during procedure: OR  Anesthesiologist: VERNA VALIENTE  CRNA: ANDREW DE LA CRUZ    Indications and Patient Condition  Indications for airway management: airway protection    Preoxygenated: yes  Mask difficulty assessment: 1 - vent by mask    Final Airway Details  Final airway type: endotracheal airway      Successful airway: ETT  Cuffed: yes   Successful intubation technique: direct laryngoscopy  Endotracheal tube insertion site: oral  Blade: Perez  Blade size: #2  ETT size: 7.0 mm  Cormack-Lehane Classification: grade I - full view of glottis  Placement verified by: chest auscultation and capnometry   Cuff volume (mL): 4  Measured from: lips  ETT to lips (cm): 20  Number of attempts at approach: 1    Additional Comments  EBBS: ETCO2+: Atraumatic intubation; Lips and teeth same as pre op

## 2018-07-12 ENCOUNTER — APPOINTMENT (OUTPATIENT)
Dept: GENERAL RADIOLOGY | Facility: HOSPITAL | Age: 48
End: 2018-07-12

## 2018-07-12 VITALS
BODY MASS INDEX: 20.8 KG/M2 | DIASTOLIC BLOOD PRESSURE: 56 MMHG | TEMPERATURE: 97.6 F | HEART RATE: 70 BPM | HEIGHT: 62 IN | RESPIRATION RATE: 16 BRPM | OXYGEN SATURATION: 96 % | WEIGHT: 113 LBS | SYSTOLIC BLOOD PRESSURE: 98 MMHG

## 2018-07-12 LAB
BASOPHILS # BLD AUTO: 0.01 10*3/MM3 (ref 0–0.2)
BASOPHILS NFR BLD AUTO: 0.1 % (ref 0–1.5)
DEPRECATED RDW RBC AUTO: 44.1 FL (ref 37–54)
EOSINOPHIL # BLD AUTO: 0 10*3/MM3 (ref 0–0.7)
EOSINOPHIL NFR BLD AUTO: 0 % (ref 0.3–6.2)
ERYTHROCYTE [DISTWIDTH] IN BLOOD BY AUTOMATED COUNT: 12.9 % (ref 11.7–13)
HCT VFR BLD AUTO: 33.1 % (ref 35.6–45.5)
HGB BLD-MCNC: 10.5 G/DL (ref 11.9–15.5)
IMM GRANULOCYTES # BLD: 0.03 10*3/MM3 (ref 0–0.03)
IMM GRANULOCYTES NFR BLD: 0.3 % (ref 0–0.5)
LYMPHOCYTES # BLD AUTO: 1.47 10*3/MM3 (ref 0.9–4.8)
LYMPHOCYTES NFR BLD AUTO: 13.5 % (ref 19.6–45.3)
MCH RBC QN AUTO: 30 PG (ref 26.9–32)
MCHC RBC AUTO-ENTMCNC: 31.7 G/DL (ref 32.4–36.3)
MCV RBC AUTO: 94.6 FL (ref 80.5–98.2)
MONOCYTES # BLD AUTO: 1.25 10*3/MM3 (ref 0.2–1.2)
MONOCYTES NFR BLD AUTO: 11.5 % (ref 5–12)
NEUTROPHILS # BLD AUTO: 8.13 10*3/MM3 (ref 1.9–8.1)
NEUTROPHILS NFR BLD AUTO: 74.6 % (ref 42.7–76)
PLATELET # BLD AUTO: 163 10*3/MM3 (ref 140–500)
PMV BLD AUTO: 10.7 FL (ref 6–12)
RBC # BLD AUTO: 3.5 10*6/MM3 (ref 3.9–5.2)
WBC NRBC COR # BLD: 10.89 10*3/MM3 (ref 4.5–10.7)

## 2018-07-12 PROCEDURE — 85025 COMPLETE CBC W/AUTO DIFF WBC: CPT | Performed by: NEUROLOGICAL SURGERY

## 2018-07-12 PROCEDURE — 72040 X-RAY EXAM NECK SPINE 2-3 VW: CPT

## 2018-07-12 PROCEDURE — 25810000003 SODIUM CHLORIDE 0.9 % WITH KCL 20 MEQ 20-0.9 MEQ/L-% SOLUTION: Performed by: NEUROLOGICAL SURGERY

## 2018-07-12 PROCEDURE — 25010000003 CEFAZOLIN IN DEXTROSE 2-4 GM/100ML-% SOLUTION: Performed by: NEUROLOGICAL SURGERY

## 2018-07-12 RX ORDER — HYDROCODONE BITARTRATE AND ACETAMINOPHEN 5; 325 MG/1; MG/1
1 TABLET ORAL EVERY 4 HOURS PRN
Qty: 35 TABLET | Refills: 0
Start: 2018-07-12 | End: 2018-07-21

## 2018-07-12 RX ORDER — CEPHALEXIN 500 MG/1
500 CAPSULE ORAL 4 TIMES DAILY
Qty: 20 CAPSULE | Refills: 0
Start: 2018-07-12 | End: 2018-07-17

## 2018-07-12 RX ADMIN — POTASSIUM CHLORIDE AND SODIUM CHLORIDE 100 ML/HR: 900; 150 INJECTION, SOLUTION INTRAVENOUS at 03:43

## 2018-07-12 RX ADMIN — CEFAZOLIN SODIUM 2 G: 2 INJECTION, SOLUTION INTRAVENOUS at 11:31

## 2018-07-12 RX ADMIN — HYDROCODONE BITARTRATE AND ACETAMINOPHEN 1 TABLET: 5; 325 TABLET ORAL at 14:52

## 2018-07-12 RX ADMIN — HYDROCODONE BITARTRATE AND ACETAMINOPHEN 1 TABLET: 5; 325 TABLET ORAL at 09:38

## 2018-07-12 RX ADMIN — CEFAZOLIN SODIUM 2 G: 2 INJECTION, SOLUTION INTRAVENOUS at 03:43

## 2018-07-12 NOTE — PLAN OF CARE
Problem: Patient Care Overview  Goal: Plan of Care Review  Outcome: Ongoing (interventions implemented as appropriate)    Goal: Individualization and Mutuality  Outcome: Ongoing (interventions implemented as appropriate)    Goal: Discharge Needs Assessment  Outcome: Ongoing (interventions implemented as appropriate)    Goal: Interprofessional Rounds/Family Conf  Outcome: Ongoing (interventions implemented as appropriate)      Problem: Pain, Acute (Adult)  Goal: Identify Related Risk Factors and Signs and Symptoms  Outcome: Ongoing (interventions implemented as appropriate)    Goal: Acceptable Pain Control/Comfort Level  Outcome: Ongoing (interventions implemented as appropriate)      Problem: Fall Risk (Adult)  Goal: Identify Related Risk Factors and Signs and Symptoms  Outcome: Ongoing (interventions implemented as appropriate)    Goal: Absence of Fall  Outcome: Ongoing (interventions implemented as appropriate)

## 2018-07-12 NOTE — DISCHARGE SUMMARY
Nicki Fowler  1970    Patient Care Team:  Elizabeth West MD as PCP - General (Family Medicine)    Date of Admit: 7/11/2018    Date of Discharge:  7/12/2018    Discharge Diagnosis:Principal Problem:    Cervical radiculopathy      Procedures Performed  Procedure(s):  C5-6 ANTERIOR CERVICAL DISCECTOMY FUSION WITH INSTRUMENTATION       Complications: None    Consultants:   Consults     No orders found for last 30 day(s).          Condition on Discharge: stable    Discharge disposition: home        Brief HPI: This is a very pleasant 47-year-old female with a history of severe neck and arm pain.  Due to failure of conservative measures, the patient was brought to the operating room for the above stated elective surgical procedure.  Please see admission H&P for further details.    Hospital Course: Aside from the expected sore throat and neck pain, the patient is feeling much better.  She reports some mild tingling in the left hand which is also not unexpected.  She is tolerating liquids and voiding without difficulty.  She is ambulating well.  She is wearing her soft cervical collar and the incision is flat and soft with edges well approximated.  There is no redness, swelling, or drainage.  The CHRIS drain put out minimal amounts overnight and will therefore be discontinued.  The patient's postoperative cervical x-ray showed good alignment.  The patient feels well enough for discharge home.  Both she and Dr. Kendrick are in agreement with the plan.      Temp:  [97.5 °F (36.4 °C)-98.3 °F (36.8 °C)] 97.8 °F (36.6 °C)  Heart Rate:  [59-94] 70  Resp:  [16] 16  BP: (107-135)/(60-79) 111/71    Current labs:  Lab Results (last 24 hours)     Procedure Component Value Units Date/Time    CBC & Differential [[id number]] Collected:  07/12/18 0545    Specimen:  Blood Updated:  07/12/18 0654    Narrative:       The following orders were created for panel order CBC & Differential.  Procedure                                Abnormality         Status                     ---------                               -----------         ------                     CBC Auto Differential[[id number]]        Abnormal            Final result                 Please view results for these tests on the individual orders.    CBC Auto Differential [[id number]]  (Abnormal) Collected:  07/12/18 0545    Specimen:  Blood Updated:  07/12/18 0654     WBC 10.89 (H) 10*3/mm3      RBC 3.50 (L) 10*6/mm3      Hemoglobin 10.5 (L) g/dL      Hematocrit 33.1 (L) %      MCV 94.6 fL      MCH 30.0 pg      MCHC 31.7 (L) g/dL      RDW 12.9 %      RDW-SD 44.1 fl      MPV 10.7 fL      Platelets 163 10*3/mm3      Neutrophil % 74.6 %      Lymphocyte % 13.5 (L) %      Monocyte % 11.5 %      Eosinophil % 0.0 (L) %      Basophil % 0.1 %      Immature Grans % 0.3 %      Neutrophils, Absolute 8.13 (H) 10*3/mm3      Lymphocytes, Absolute 1.47 10*3/mm3      Monocytes, Absolute 1.25 (H) 10*3/mm3      Eosinophils, Absolute 0.00 10*3/mm3      Basophils, Absolute 0.01 10*3/mm3      Immature Grans, Absolute 0.03 10*3/mm3             Discharge Medications  Magdi has been reviewed and narcotic consent is on file in the patient's chart.     Your medication list      START taking these medications      Instructions Last Dose Given Next Dose Due   cephalexin 500 MG capsule  Commonly known as:  KEFLEX      Take 1 capsule by mouth 4 (Four) Times a Day for 5 days.       HYDROcodone-acetaminophen 5-325 MG per tablet  Commonly known as:  NORCO      Take 1 tablet by mouth Every 4 (Four) Hours As Needed for Moderate Pain  for up to 9 days.          CONTINUE taking these medications      Instructions Last Dose Given Next Dose Due   baclofen 20 MG tablet  Commonly known as:  LIORESAL      10 mg Every Night.       FIBER PO      Take  by mouth Every Morning.       nortriptyline 10 MG capsule  Commonly known as:  PAMELOR      Take 1 capsule by mouth Every Night.             Where to Get Your Medications       Information about where to get these medications is not yet available    Ask your nurse or doctor about these medications  · cephalexin 500 MG capsule  · HYDROcodone-acetaminophen 5-325 MG per tablet         Discharge Diet: Patient is tolerating liquids well and instructed to advance to a soft diet tolerated.    Diet Order   Procedures   • Diet Clear Liquid       Activity at Discharge:   Activity Instructions     Discharge Activity Restrictions       Listed above the attached postoperative instructions.          Call for: Incisional redness, swelling, drainage, increased pain, difficulty swallowing, temperature greater than 100.5°, questions or concerns.    Follow-up Appointments  Future Appointments  Date Time Provider Department Center   7/27/2018 12:30 PM JAYDA Villa [facility] None     Follow-up Information     Elizabeth West MD .    Specialty:  Family Medicine  Contact information:  [address] [place], [place] [id number] [phone]                 Additional Instructions for the Follow-ups that You Need to Schedule     Call MD With Problems / Concerns    As directed      Instructions: Call Dr. Kendrick for incisional redness, swelling, drainage, increased pain, difficulty swallowing, temperature greater than 100.5°, questions or concerns.    Order Comments:  Instructions: Call Dr. Kendrick for incisional redness, swelling, drainage, increased pain, difficulty swallowing, temperature greater than 100.5°, questions or concerns.                Test Results Pending at Discharge     None    I discussed the discharge instructions with patient and family    JAYDA Villa  07/12/18  12:45 PM

## 2018-07-13 ENCOUNTER — TELEPHONE (OUTPATIENT)
Dept: NEUROSURGERY | Facility: CLINIC | Age: 48
End: 2018-07-13

## 2018-07-13 NOTE — TELEPHONE ENCOUNTER
Patient's  called and states the patient's Keflex is too big for her to swallow. It is a capsule therefore she cannot break it in half. Monika showed her yesterday before she left how to swallow easier but it just does not work he says. He states he just cannot believe that we would give such a large pill for a person who has had neck surgery when difficulty swallowing can be a common side effect. I explained that medication is what we use unless someone is allergic to it. How would you like to proceed?

## 2018-07-13 NOTE — TELEPHONE ENCOUNTER
I called the patient's  back and advised him of Dr. Kendrick' message. He verbalized understanding and also asked about when to remover her bandage and I reminded him of the blue post-op instruction sheet that has that information on it and it will be the 2nd day after surgery. He then asked if it is ok to use some ice on her neck? I told him yet just do not use heat and they are to call with any redness, increased swelling, fever or incision drainage. He verbalized understanding.

## 2018-07-13 NOTE — TELEPHONE ENCOUNTER
Since it is not critical that she take it if it is that difficult then just stop it.  I don't want to try anything else because we may need those medications were she to get an infection.

## 2018-07-19 ENCOUNTER — TELEPHONE (OUTPATIENT)
Dept: NEUROSURGERY | Facility: CLINIC | Age: 48
End: 2018-07-19

## 2018-07-19 NOTE — TELEPHONE ENCOUNTER
How are you feeling? OK    Are you having any pain? Where? Achy in the shoulder and collar bone area    Are you taking the pain RX? No    Do you think it's helping? N/A    Do you feel better than before surgery? Better on right side. Numbness and tingling on left side that wasn't there pre-op    Was your dressing clean and dry? yes    Dermabond OK? Yes    Is you incision red, swollen or bleeding? None, no fever    Are you having any trouble with nausea or constipation? none    Were your discharge instructions easy to understand? yes    Any other questions?    Confirm post op appt - yes

## 2018-07-27 ENCOUNTER — OFFICE VISIT (OUTPATIENT)
Dept: NEUROSURGERY | Facility: CLINIC | Age: 48
End: 2018-07-27

## 2018-07-27 VITALS — SYSTOLIC BLOOD PRESSURE: 121 MMHG | HEART RATE: 73 BPM | DIASTOLIC BLOOD PRESSURE: 72 MMHG

## 2018-07-27 DIAGNOSIS — Z09 SURGICAL FOLLOWUP VISIT: Primary | ICD-10-CM

## 2018-07-27 PROCEDURE — 99024 POSTOP FOLLOW-UP VISIT: CPT | Performed by: NURSE PRACTITIONER

## 2018-07-27 RX ORDER — CYCLOBENZAPRINE HCL 5 MG
5 TABLET ORAL 3 TIMES DAILY PRN
Qty: 30 TABLET | Refills: 0 | Status: SHIPPED | OUTPATIENT
Start: 2018-07-27 | End: 2019-11-12

## 2018-07-27 NOTE — PROGRESS NOTES
Subjective   Patient ID: Nicki Fowler is a 47 y.o. female is here today for follow-up. She is nearly 3 weeks out from a C5-6 ACDF done by Dr. Kendrick on 7/11/2018. Her incision is clean and dry with no redness, incision drainage and she denies any fevers. She does have some mild neck pain at times still. She does take Baclofen QHS    Ms. Fowler is here today for follow-up from 1 level ACDF with Dr. Kendrick.  She is doing well.  She notes some improvement in her left arm pain.  She has been having some increased neck spasms and does not feel that the baclofen has been helping.            Review of Systems   Constitutional: Negative for fever.   Respiratory: Negative for chest tightness and shortness of breath.    Cardiovascular: Negative for chest pain.   Musculoskeletal: Positive for neck pain.   All other systems reviewed and are negative.      Objective   Physical Exam   Constitutional: She appears well-developed. No distress.   Skin: Skin is warm and dry.   Cervical anterior incision is well approximated. No redness, swelling or drainage.    Psychiatric: She has a normal mood and affect.   Vitals reviewed.    Neurologic Exam    Assessment/Plan         Medical Decision Making:      Ms. Fowler is doing well.  The cervical pain should get better with time but for now she will try some Flexeril.  She will also start some physical therapy in the next 1-2 weeks.  If she expresses any worsening symptoms, she was encouraged to call the office.  Ms. Fowler will return to the office for reevaluation in 3 weeks.  Return to work will be discussed at that time.    Nicki was seen today for post-op.    Diagnoses and all orders for this visit:    Surgical followup visit  -     Ambulatory Referral to Physical Therapy Evaluate and treat; Heat, Electrotherapy; E-stim; Stretching (cervical strengthening with modalities and intro to HEP), ROM, Strengthening    Other orders  -     cyclobenzaprine (FLEXERIL) 5 MG tablet; Take 1 tablet by  mouth 3 (Three) Times a Day As Needed for Muscle Spasms.      Return in about 3 weeks (around 8/17/2018).

## 2018-08-07 ENCOUNTER — TREATMENT (OUTPATIENT)
Dept: PHYSICAL THERAPY | Facility: CLINIC | Age: 48
End: 2018-08-07

## 2018-08-07 DIAGNOSIS — Z09 SURGICAL FOLLOW-UP CARE: Primary | ICD-10-CM

## 2018-08-07 PROCEDURE — 97110 THERAPEUTIC EXERCISES: CPT | Performed by: PHYSICAL THERAPIST

## 2018-08-07 PROCEDURE — 97140 MANUAL THERAPY 1/> REGIONS: CPT | Performed by: PHYSICAL THERAPIST

## 2018-08-07 PROCEDURE — 97014 ELECTRIC STIMULATION THERAPY: CPT | Performed by: PHYSICAL THERAPIST

## 2018-08-07 PROCEDURE — 97161 PT EVAL LOW COMPLEX 20 MIN: CPT | Performed by: PHYSICAL THERAPIST

## 2018-08-07 NOTE — PROGRESS NOTES
"  Physical Therapy Initial Evaluation and Plan of Care    Patient: Nicki Fowler   : 1970  Diagnosis/ICD-10 Code:  Surgical follow-up care [Z09]  Referring practitioner: JAYDA Villa  Date of Initial Visit: 2018  Today's Date: 2018  Patient seen for 1 sessions           Subjective Questionnaire: NDI:16      Subjective Evaluation    History of Present Illness  Date of surgery: 2018  Mechanism of injury: Pt reports 10 year Hx of neck and arm pain due to cervical DDD. Was dropping objects with R hand. C5-6 ACDF 18. Pt reports she has some pain currently, \"but it is not what is has been at all\". Pain is in shoulders, upper back, and chest. Pain worsens as the day goes on. Increases with going for a walk. Taking tylenol prn and flexeril 1-2x/daily. Sleeping on back. Turning on sides more often as of late. Some increased pain when sleeping on R side.  Currently off work. Plans to return to work after follow up on 18.      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       Postural Observations  Seated posture: poor    Additional Postural Observation Details  Fwd shoulders, fwd head    Palpation   Left   No palpable tenderness to the scalenes and sternocleidomastoid.   Tenderness of the pectoralis major, pectoralis minor and upper trapezius.     Right   No palpable tenderness to the scalenes " and sternocleidomastoid. Tenderness of the pectoralis major, pectoralis minor and upper trapezius.     Tenderness   Cervical Spine   No tenderness in the spinous process.     Neurological Testing     Additional Neurological Details  Pt reports partial numbness in ulnar distribution of L lower arm and R fingertips.    Active Range of Motion   Cervical/Thoracic Spine   Cervical    Flexion: 35 degrees   Extension: 30 degrees   Left lateral flexion: 25 degrees   Right lateral flexion: 25 degrees with pain  Left rotation: 60 degrees   Right rotation: 60 degrees     Strength/Myotome Testing     Left Shoulder     Planes of Motion   Flexion: 4   Abduction: 4   External rotation at 0°: 4   Internal rotation at 0°: 4     Right Shoulder     Planes of Motion   Flexion: 4   Abduction: 4   External rotation at 0°: 4   Internal rotation at 0°: 4     Left Elbow   Flexion: 5  Extension: 4    Right Elbow   Flexion: 5  Extension: 4    Additional Strength Details   L 60lbs, R 54lbs         Assessment & Plan     Assessment  Impairments: abnormal muscle tone, abnormal or restricted ROM, activity intolerance, impaired physical strength, lacks appropriate home exercise program and pain with function  Assessment details: Pt will benefit from skilled PT services in order to address listed impairments and increase tolerance to normal daily activities including ADLs, work, and recreational activities.    Prognosis: good  Functional Limitations: carrying objects, lifting, sleeping, pulling, pushing and uncomfortable because of pain  Goals  Plan Goals: Short Term Goals: 2-4 weeks  Patient will:  1. Be independent with initial HEP  2. Be instructed in posture and body mechanics  3. Report pain </= 2/10 with all daily activities    Long Term Goals: 4-8 weeks  Patient will:  1. Report pain of </= 1/10 with all daily activities  2. Demonstrate no soft tissue restriction in involved musculature to allow for ROM WFL w/ fusion.  3. Demonstrate  correction of fwd head and shoulder posture w/o verbal cuing  4. Have  strength R >/= L  5. Perceived disability </=10% as measured Neck Disability Index    Plan  Therapy options: will be seen for skilled physical therapy services  Planned modality interventions: cryotherapy, electrical stimulation/Russian stimulation, thermotherapy (hydrocollator packs), traction and ultrasound  Planned therapy interventions: abdominal trunk stabilization, ADL retraining, balance/weight-bearing training, body mechanics training, flexibility, functional ROM exercises, gait training, home exercise program, joint mobilization, manual therapy, neuromuscular re-education, soft tissue mobilization, spinal/joint mobilization, strengthening, stretching and therapeutic activities  Other planned therapy interventions: Dry Needling  Frequency: 2x week  Duration in weeks: 8  Treatment plan discussed with: patient        Manual Therapy:    10     mins  19741;  Therapeutic Exercise:    15     mins  73325;     Neuromuscular Aminta:    0    mins  76555;    Therapeutic Activity:     0     mins  91846;     Gait Trainin     mins  10129;     Ultrasound:     0     mins  39654;    Electrical Stimulation:    15     mins  15068 ( );  Dry Needling     0     mins self-pay    Timed Treatment:   25   mins   Total Treatment:     75   mins    PT SIGNATURE: Miriam Chavarria PT   DATE TREATMENT INITIATED: 2018    Initial Certification  Certification Period: 2018  I certify that the therapy services are furnished while this patient is under my care.  The services outlined above are required by this patient, and will be reviewed every 90 days.     PHYSICIAN: Monika Pavno, APRN      DATE:     Please sign and return via fax to [phone].. Thank you, [facility].

## 2018-08-09 NOTE — PROGRESS NOTES
Subjective   Patient ID: Nicki Fowler is a 48 y.o. female is here today for follow-up. She is 5 weeks out from a C5-6 ACDF done by Dr. Kendrick on 7/11/2018. Patient is currently taking Flexeril 5 mg TID. Patient has been going to PT and states that it has been helping.  She is still having some mild neck spasms. Flexeril is helping.    Ms. Fowler is here today for postoperative follow up visit. She has no arm pain or numbness. She is pleased with her progress.             Review of Systems   Respiratory: Negative for chest tightness and shortness of breath.    Cardiovascular: Negative for chest pain.   Musculoskeletal: Positive for neck pain and neck stiffness.   All other systems reviewed and are negative.      Objective   Physical Exam   Constitutional: She appears well-developed. No distress.   Skin: Skin is warm and dry.   Cervical anterior incision is well approximated. No redness, swelling or drainage.    Psychiatric: She has a normal mood and affect.   Vitals reviewed.    Neurologic Exam    Assessment/Plan       Medical Decision Making:      Ms. Fowler is now 5 weeks out from 1 level anterior cervical discectomy and fusion procedure with Dr. Kendrick.  Her voice quality is normal.  She is swallowing without difficulty. Her presurgical neck and arm pain has improved.  She has completed physical therapy and is doing her home exercises.    Ms. Fowler feels ready for return to work. Her job is non-physical.  She will call the office if she experiences any worsening symptoms otherwise she will follow up again in 6 weeks with new cervical spine x-rays.  She will see Dr. Merino at the next appointment.      Nicki was seen today for post-op.    Diagnoses and all orders for this visit:    Surgical followup visit  -     XR Spine Cervical 2 View; Future      Return in about 6 weeks (around 9/27/2018) for with Dr. Kendrick and new cervical Xrays.

## 2018-08-14 ENCOUNTER — TREATMENT (OUTPATIENT)
Dept: PHYSICAL THERAPY | Facility: CLINIC | Age: 48
End: 2018-08-14

## 2018-08-14 DIAGNOSIS — Z09 SURGICAL FOLLOW-UP CARE: Primary | ICD-10-CM

## 2018-08-14 PROCEDURE — 97110 THERAPEUTIC EXERCISES: CPT | Performed by: PHYSICAL THERAPIST

## 2018-08-14 PROCEDURE — 97014 ELECTRIC STIMULATION THERAPY: CPT | Performed by: PHYSICAL THERAPIST

## 2018-08-14 PROCEDURE — 97140 MANUAL THERAPY 1/> REGIONS: CPT | Performed by: PHYSICAL THERAPIST

## 2018-08-14 NOTE — PROGRESS NOTES
Physical Therapy Daily Progress Note    Subjective   Pt reports neck muscles are a little sore, usually with exercise.    Objective   See Exercise, Manual, and Modality Logs for complete treatment.       Assessment/Plan  Tolerated new exercises well w/o c/o pain. Issued putty for  strength at home. Pt reports relief with estim and heat post session. Progress per POC.                 Manual Therapy:    12     mins  62388;  Therapeutic Exercise:    25     mins  32540;     Neuromuscular Aminta:    0    mins  85276;    Therapeutic Activity:     0     mins  26656;     Gait Trainin     mins  37886;     Ultrasound:     0     mins  61057;    Work Hardening           0      mins 25006  Iontophoresis               0   mins 91820    Timed Treatment:   37   mins   Total Treatment:     60   mins    Miriam Chavarria, PT  Physical Therapist

## 2018-08-16 ENCOUNTER — OFFICE VISIT (OUTPATIENT)
Dept: NEUROSURGERY | Facility: CLINIC | Age: 48
End: 2018-08-16

## 2018-08-16 VITALS — HEART RATE: 90 BPM | DIASTOLIC BLOOD PRESSURE: 60 MMHG | SYSTOLIC BLOOD PRESSURE: 108 MMHG | HEIGHT: 62 IN

## 2018-08-16 DIAGNOSIS — Z09 SURGICAL FOLLOWUP VISIT: Primary | ICD-10-CM

## 2018-08-16 PROCEDURE — 99024 POSTOP FOLLOW-UP VISIT: CPT | Performed by: NURSE PRACTITIONER

## 2018-08-21 ENCOUNTER — TREATMENT (OUTPATIENT)
Dept: PHYSICAL THERAPY | Facility: CLINIC | Age: 48
End: 2018-08-21

## 2018-08-21 DIAGNOSIS — Z09 SURGICAL FOLLOW-UP CARE: Primary | ICD-10-CM

## 2018-08-21 PROCEDURE — 97110 THERAPEUTIC EXERCISES: CPT | Performed by: PHYSICAL THERAPIST

## 2018-08-21 NOTE — PROGRESS NOTES
Physical Therapy Daily Progress Note    Subjective   Pt reports she is doing well, no pain currently. Typically, she feels a little stiff and achiness is worse at the end of the day. This has been getting better with time.    Objective   See Exercise, Manual, and Modality Logs for complete treatment.       Assessment/Plan  Minimal tenderness of cervical mm. Excellent tolerance to postural strengthening progressions. Declined need for e-stim. Progress per POC.                 Manual Therapy:    0     mins  91366;  Therapeutic Exercise:    38     mins  27760;     Neuromuscular Aminta:    0    mins  83711;    Therapeutic Activity:     0     mins  15929;     Gait Trainin     mins  12778;     Ultrasound:     0     mins  40488;    Work Hardening           0      mins 14300  Iontophoresis               0   mins 88536    Timed Treatment:   38   mins   Total Treatment:     40   mins    Miriam Chavarria, PT  Physical Therapist

## 2018-08-28 ENCOUNTER — TREATMENT (OUTPATIENT)
Dept: PHYSICAL THERAPY | Facility: CLINIC | Age: 48
End: 2018-08-28

## 2018-08-28 DIAGNOSIS — Z09 SURGICAL FOLLOW-UP CARE: Primary | ICD-10-CM

## 2018-08-28 PROCEDURE — 97014 ELECTRIC STIMULATION THERAPY: CPT | Performed by: PHYSICAL THERAPIST

## 2018-08-28 PROCEDURE — 97110 THERAPEUTIC EXERCISES: CPT | Performed by: PHYSICAL THERAPIST

## 2018-08-28 NOTE — PROGRESS NOTES
"Physical Therapy Daily Progress Note    Subjective   Pt reports no new changes. Neck \"cracks\" but is not painful.    Objective   See Exercise, Manual, and Modality Logs for complete treatment.       Assessment/Plan  Excellent tolerance to postural strengthening. Updated HEP. Pt reports pain adequate pain relief with estim and heat post session. Progress per POC.                 Manual Therapy:    0     mins  91755;  Therapeutic Exercise:    25     mins  64108;     Neuromuscular Aminta:    0    mins  89722;    Therapeutic Activity:     0     mins  69506;     Gait Trainin     mins  22880;     Ultrasound:     0     mins  72550;    Work Hardening           0      mins 41912  Iontophoresis               0   mins 29410    Timed Treatment:   25   mins   Total Treatment:     40   mins    Miriam Chavarria, PT  Physical Therapist  "

## 2018-09-04 ENCOUNTER — TREATMENT (OUTPATIENT)
Dept: PHYSICAL THERAPY | Facility: CLINIC | Age: 48
End: 2018-09-04

## 2018-09-04 DIAGNOSIS — Z09 SURGICAL FOLLOW-UP CARE: Primary | ICD-10-CM

## 2018-09-04 PROCEDURE — 97014 ELECTRIC STIMULATION THERAPY: CPT | Performed by: PHYSICAL THERAPIST

## 2018-09-04 PROCEDURE — 97110 THERAPEUTIC EXERCISES: CPT | Performed by: PHYSICAL THERAPIST

## 2018-09-14 NOTE — PROGRESS NOTES
Subjective   Patient ID: Nicki Fowler is a 48 y.o. female is here today for follow-up with a new Cervical XR. She is 5 weeks out from a C5-6 ACDF done on 7/11/2018. She does have some neck pain at times.    History of Present Illness    This patient returns today.  She is doing well.  She has occasional symptoms in her left arm but nothing on a regular basis.  Her incision has healed well.    The following portions of the patient's history were reviewed and updated as appropriate: allergies, current medications, past family history, past medical history, past social history, past surgical history and problem list.    Review of Systems   Respiratory: Negative for chest tightness and shortness of breath.    Cardiovascular: Negative for chest pain.   Musculoskeletal: Positive for neck pain and neck stiffness.   All other systems reviewed and are negative.      Objective   Physical Exam   Constitutional: She is oriented to person, place, and time. She appears well-developed and well-nourished.   Neurological: She is oriented to person, place, and time.     Neurologic Exam     Mental Status   Oriented to person, place, and time.       Assessment/Plan   Independent Review of Radiographic Studies:      Reviewed her x-rays that were done earlier today which show excellent alignment of the construct at C5 6.    Medical Decision Making:      I told the patient that she can gradually return to normal activities.  She is to call if any problems develop.    Nicki was seen today for neck pain.    Diagnoses and all orders for this visit:    Follow-up examination following surgery      Return if symptoms worsen or fail to improve.

## 2018-09-18 ENCOUNTER — HOSPITAL ENCOUNTER (OUTPATIENT)
Dept: GENERAL RADIOLOGY | Facility: HOSPITAL | Age: 48
Discharge: HOME | End: 2018-09-18
Admitting: NURSE PRACTITIONER

## 2018-09-18 ENCOUNTER — OFFICE VISIT (OUTPATIENT)
Dept: NEUROSURGERY | Facility: CLINIC | Age: 48
End: 2018-09-18

## 2018-09-18 VITALS — HEART RATE: 92 BPM | SYSTOLIC BLOOD PRESSURE: 120 MMHG | DIASTOLIC BLOOD PRESSURE: 72 MMHG

## 2018-09-18 DIAGNOSIS — Z09 SURGICAL FOLLOWUP VISIT: ICD-10-CM

## 2018-09-18 DIAGNOSIS — Z09 FOLLOW-UP EXAMINATION FOLLOWING SURGERY: Primary | ICD-10-CM

## 2018-09-18 PROCEDURE — 72040 X-RAY EXAM NECK SPINE 2-3 VW: CPT

## 2018-09-18 PROCEDURE — 99024 POSTOP FOLLOW-UP VISIT: CPT | Performed by: NEUROLOGICAL SURGERY

## 2018-10-04 ENCOUNTER — APPOINTMENT (OUTPATIENT)
Dept: WOMENS IMAGING | Facility: HOSPITAL | Age: 48
End: 2018-10-04

## 2018-10-04 PROCEDURE — 77067 SCR MAMMO BI INCL CAD: CPT | Performed by: RADIOLOGY

## 2018-11-30 ENCOUNTER — OFFICE VISIT (OUTPATIENT)
Dept: GASTROENTEROLOGY | Facility: CLINIC | Age: 48
End: 2018-11-30

## 2018-11-30 VITALS
HEIGHT: 62 IN | WEIGHT: 117.2 LBS | SYSTOLIC BLOOD PRESSURE: 116 MMHG | DIASTOLIC BLOOD PRESSURE: 72 MMHG | BODY MASS INDEX: 21.57 KG/M2 | TEMPERATURE: 98.4 F

## 2018-11-30 DIAGNOSIS — K63.5 HYPERPLASTIC COLONIC POLYP, UNSPECIFIED PART OF COLON: ICD-10-CM

## 2018-11-30 DIAGNOSIS — K58.0 IRRITABLE BOWEL SYNDROME WITH DIARRHEA: Primary | ICD-10-CM

## 2018-11-30 PROCEDURE — 99214 OFFICE O/P EST MOD 30 MIN: CPT | Performed by: NURSE PRACTITIONER

## 2018-11-30 RX ORDER — NORTRIPTYLINE HYDROCHLORIDE 10 MG/1
10 CAPSULE ORAL NIGHTLY
Qty: 30 CAPSULE | Refills: 11 | Status: SHIPPED | OUTPATIENT
Start: 2018-11-30 | End: 2019-12-10 | Stop reason: SDUPTHER

## 2018-11-30 NOTE — PROGRESS NOTES
Chief Complaint   Patient presents with   • Diarrhea   • Abdominal Pain       Nicki Fowler is a  48 y.o. female here for a follow up visit for IBS.     HPI  49 yo f presents today for follow up visit for IBS-D. She is a patient of Dr. Mares. She was last seen in the office on 18. She underwent Colonoscopy on 2018 that showed NTEH and hp polyp. Recall colonoscopy in 5 years. After the colonoscopy she was started on Pamelor 10 mg QHS. She admits since taking it her diarrhea has improved 90%. She admits maybe once a week she will have some diarrhea but nothing like before and she is no longer being woke up at night with it. She denies any dysphagia, reflux, abd pain, N&V, diarrhea, constipation, rectal bleeding or melena. She admits her appetite is ok and her weight is stable.     Past Medical History:   Diagnosis Date   • Anxiety and depression    • Arthritis    • Cervical disc disorder    • Closed fracture of coccyx (CMS/[facility]) 2013   • Colon polyp 2018    Dr. Mares removed during colonoscopy   • Dizziness     constant x 3 weeks   • Headache    • History of seasonal allergies    • IBS (irritable bowel syndrome)    • Lactose intolerance early     dairy causes bloating and gas   • Low back pain    • Lumbosacral disc disease    • Peripheral neuropathy        Past Surgical History:   Procedure Laterality Date   • ABDOMINAL SURGERY  10/10/1996       •  SECTION     • EPIDURAL BLOCK      Series of 4 block for cervical ddd   • LASIK     • WISDOM TOOTH EXTRACTION         Scheduled Meds:    Continuous Infusions:  No current facility-administered medications for this visit.     PRN Meds:.    Allergies   Allergen Reactions   • Shellfish-Derived Products Hives       Social History     Socioeconomic History   • Marital status:      Spouse name: Not on file   • Number of children: 3   • Years of education: BA   • Highest education level: Not on file   Social Needs   • Financial  resource strain: Not on file   • Food insecurity - worry: Not on file   • Food insecurity - inability: Not on file   • Transportation needs - medical: Not on file   • Transportation needs - non-medical: Not on file   Occupational History   • Occupation:    Tobacco Use   • Smoking status: Former Smoker     Packs/day: 2.00     Years: 20.00     Pack years: 40.00     Types: Cigarettes     Start date: 1983     Last attempt to quit: 2008     Years since quitting: 10.9   • Smokeless tobacco: Never Used   • Tobacco comment: stopped 5 years for pregnancies   Substance and Sexual Activity   • Alcohol use: No     Comment: Social   • Drug use: No   • Sexual activity: Yes     Partners: Male     Birth control/protection: Other   Other Topics Concern   • Not on file   Social History Narrative    LIVES WITH  AND CHILDREN       Family History   Problem Relation Age of Onset   • Cancer Mother         Non-Hodgkins Lymphoma   • Hypertension Mother    • Atrial fibrillation Mother    • Arthritis Mother         DDD   • Heart disease Mother         A-fib   • Hyperlipidemia Mother    • Thyroid disease Mother         Hypothyroid   • Cancer Father         Hodgkins Lymphoma and Non-Hodgkins Lymphoma at two separate times of his life   • Heart attack Father    • Stroke Father    • Hypertension Brother    • Arthritis Brother         DDD   • Hyperlipidemia Brother    • Asthma Daughter    • Arthritis Brother         DDD   • Heart disease Maternal Aunt         Congestive Heart Failure   • Malig Hyperthermia Neg Hx        Review of Systems   Constitutional: Negative for appetite change, chills, diaphoresis, fatigue, fever and unexpected weight change.   HENT: Negative for nosebleeds, postnasal drip, sore throat, trouble swallowing and voice change.    Respiratory: Negative for cough, choking, chest tightness, shortness of breath and wheezing.    Cardiovascular: Negative for chest pain.   Gastrointestinal: Negative for  abdominal distention, abdominal pain, anal bleeding, blood in stool, constipation, diarrhea, nausea, rectal pain and vomiting.   Endocrine: Negative for polydipsia, polyphagia and polyuria.   Musculoskeletal: Negative for gait problem.   Skin: Negative for rash and wound.   Allergic/Immunologic: Negative for food allergies.   Neurological: Negative for dizziness, speech difficulty and light-headedness.   Psychiatric/Behavioral: Negative for confusion, self-injury, sleep disturbance and suicidal ideas.       Vitals:    11/30/18 1414   BP: 116/72   Temp: 98.4 °F (36.9 °C)       Physical Exam   Constitutional: She is oriented to person, place, and time. She appears well-developed and well-nourished. She does not appear ill. No distress.   HENT:   Head: Normocephalic.   Eyes: Pupils are equal, round, and reactive to light.   Cardiovascular: Normal rate, regular rhythm and normal heart sounds.   Pulmonary/Chest: Effort normal and breath sounds normal.   Abdominal: Soft. Bowel sounds are normal. She exhibits no distension and no mass. There is no hepatosplenomegaly. There is no tenderness. There is no rebound and no guarding. No hernia.   Musculoskeletal: Normal range of motion.   Neurological: She is alert and oriented to person, place, and time.   Skin: Skin is warm and dry.   Psychiatric: She has a normal mood and affect. Her speech is normal and behavior is normal. Judgment normal.       No images are attached to the encounter.    Assessment & Plan    1. Irritable bowel syndrome with diarrhea    2. Hyperplastic colonic polyp, unspecified part of colon    I reviewed the results of the colonoscopy with her today. Victor Hugo seems to be helping the IBS-D really well. Continue it for now. Will refill x 1 year. Patient to follow up with me or Dr. Mares in 1 year. Call the office with any issues.

## 2019-10-09 ENCOUNTER — APPOINTMENT (OUTPATIENT)
Dept: WOMENS IMAGING | Facility: HOSPITAL | Age: 49
End: 2019-10-09

## 2019-10-09 PROCEDURE — 77067 SCR MAMMO BI INCL CAD: CPT | Performed by: RADIOLOGY

## 2019-11-12 ENCOUNTER — OFFICE VISIT (OUTPATIENT)
Dept: GASTROENTEROLOGY | Facility: CLINIC | Age: 49
End: 2019-11-12

## 2019-11-12 VITALS
SYSTOLIC BLOOD PRESSURE: 108 MMHG | WEIGHT: 115 LBS | BODY MASS INDEX: 21.16 KG/M2 | HEIGHT: 62 IN | TEMPERATURE: 98.6 F | DIASTOLIC BLOOD PRESSURE: 76 MMHG

## 2019-11-12 DIAGNOSIS — K58.0 IRRITABLE BOWEL SYNDROME WITH DIARRHEA: Primary | ICD-10-CM

## 2019-11-12 DIAGNOSIS — K63.5 HYPERPLASTIC COLONIC POLYP, UNSPECIFIED PART OF COLON: ICD-10-CM

## 2019-11-12 PROCEDURE — 99213 OFFICE O/P EST LOW 20 MIN: CPT | Performed by: NURSE PRACTITIONER

## 2019-12-11 RX ORDER — NORTRIPTYLINE HYDROCHLORIDE 10 MG/1
CAPSULE ORAL
Qty: 30 CAPSULE | Refills: 10 | Status: SHIPPED | OUTPATIENT
Start: 2019-12-11 | End: 2020-11-03 | Stop reason: SDUPTHER

## 2020-09-16 ENCOUNTER — TREATMENT (OUTPATIENT)
Dept: PHYSICAL THERAPY | Facility: CLINIC | Age: 50
End: 2020-09-16

## 2020-09-16 DIAGNOSIS — R53.1 WEAKNESS: ICD-10-CM

## 2020-09-16 DIAGNOSIS — M25.511 CHRONIC RIGHT SHOULDER PAIN: ICD-10-CM

## 2020-09-16 DIAGNOSIS — G89.29 CHRONIC RIGHT SHOULDER PAIN: ICD-10-CM

## 2020-09-16 DIAGNOSIS — M25.611 DECREASED RANGE OF MOTION OF RIGHT SHOULDER: ICD-10-CM

## 2020-09-16 DIAGNOSIS — M75.01 ADHESIVE CAPSULITIS OF RIGHT SHOULDER: Primary | ICD-10-CM

## 2020-09-16 PROCEDURE — 97161 PT EVAL LOW COMPLEX 20 MIN: CPT | Performed by: PHYSICAL THERAPIST

## 2020-09-16 NOTE — PROGRESS NOTES
Physical Therapy Initial Evaluation and Plan of Care    Patient: Nicki Fowler   : 1970  Diagnosis/ICD-10 Code:  No primary diagnosis found.  Referring practitioner: Elizabeth West, *    Subjective Evaluation    History of Present Illness  Mechanism of injury: Pt reports R shoulder pain and decreased ROM worsening for about 6 months. Reports she had ACDF about 2 years ago and her shoulder and neck has felt tight since. Reports no specific injury to the shoulder.       Patient Occupation: computer work  Pain  Current pain ratin  At best pain ratin  At worst pain rating: 10  Location: R shoulder   Quality: needle-like, throbbing, radiating and discomfort  Relieving factors: heat  Aggravating factors: outstretched reach, repetitive movement and overhead activity  Progression: worsening    Social Support  Lives with: spouse    Hand dominance: right    Treatments  Current treatment: physical therapy  Patient Goals  Patient goals for therapy: decreased pain, increased motion, increased strength, independence with ADLs/IADLs and return to sport/leisure activities             Objective          Palpation     Right Tenderness of the anterior deltoid, infraspinatus, rhomboids and upper trapezius.     Tenderness     Right Shoulder  Tenderness in the bicipital groove and coracoid process.     Active Range of Motion   Left Shoulder   Flexion: 160 degrees   Abduction: 155 degrees   External rotation BTH: WFL  Internal rotation BTB: WFL    Right Shoulder   Flexion: 140 degrees   Abduction: 140 degrees   External rotation BTH: WFL  Internal rotation BTB: WFL    Strength/Myotome Testing     Left Shoulder     Planes of Motion   Flexion: 5   Abduction: 5   External rotation at 0°: 5   Internal rotation at 0°: 5     Right Shoulder     Planes of Motion   Flexion: 4+   Abduction: 4   External rotation at 0°: 4   Internal rotation at 0°: 4     Tests     Right Shoulder   Positive empty can and Speed's.   Negative  Cristóbal.     Functional Assessment     Comments  Quick Dash: 50          Assessment & Plan     Assessment  Impairments: abnormal muscle firing, abnormal or restricted ROM, impaired physical strength, lacks appropriate home exercise program and pain with function  Assessment details: Pt presents to PT with symptoms consistent with R shoulder adhesive capsulitis, decreased ROM, decreased strength, increase pain.  Pt would benefit from skilled PT intervention to address the deficits noted.   Prognosis: good  Functional Limitations: carrying objects, lifting, sleeping, pushing, uncomfortable because of pain, reaching behind back, reaching overhead and unable to perform repetitive tasks  Goals  Plan Goals: SHORT TERM GOALS: 6 visits  1. Patient to be compliant with HEP and no diff. with sleeping  2. Increased (R) UE strength to 4+/5 with no pain> 5/10 to allow for household and work activities.   3. Pt demonstrates improved posture in sitting and standing with minimal-no cues during treatment session    LONG TERM GOALS: 12 visits  1. Pt score <30% perceived disability on DASH   2. Pt. to exhibit (R) shoulder AROM to WFL (> 150° flex/abd. to allow for reaching overhead and behind back without pain limiting function  3. Pt to exhibit 5/5 UE strength to allow for pushing/pulling and lifting >5 #to occur with pain <2/10  4. Pt able to reach overhead and lift 10# (B)  to allow for return to doing work around home.       Plan  Therapy options: will be seen for skilled physical therapy services  Planned modality interventions: cryotherapy, electrical stimulation/Russian stimulation, TENS, thermotherapy (hydrocollator packs) and ultrasound  Other planned modality interventions: Dry Needling PRN  Planned therapy interventions: ADL retraining, flexibility, body mechanics training, home exercise program, functional ROM exercises, joint mobilization, manual therapy, neuromuscular re-education, postural training, soft tissue  mobilization, spinal/joint mobilization, strengthening, stretching and therapeutic activities  Frequency: 2x week  Duration in visits: 12  Treatment plan discussed with: patient        Manual Therapy:          mins  01375;  Therapeutic Exercise:          mins  02807;     Neuromuscular Aminta:         mins  87127;    Therapeutic Activity:           mins  18674;     Gait Training:            mins  14193;     Ultrasound:           mins  31253;    Electrical Stimulation:          mins  29788 ( );  Dry Needling           mins self-pay  Traction           mins 15027  Canalith Repositioning         mins 58235      Timed Treatment:      mins   Total Treatment:   35     mins    PT SIGNATURE: Rosalee Garces PT   [place] Lic #[id number]    DATE TREATMENT INITIATED: 9/16/2020    Initial Certification  Certification Period: 12/15/2020  I certify that the therapy services are furnished while this patient is under my care.  The services outlined above are required by this patient, and will be reviewed every 90 days.     PHYSICIAN: Elizabeth West MD      DATE:     Please sign and return via fax to [phone].. Thank you, [facility].

## 2020-09-21 ENCOUNTER — TREATMENT (OUTPATIENT)
Dept: PHYSICAL THERAPY | Facility: CLINIC | Age: 50
End: 2020-09-21

## 2020-09-21 DIAGNOSIS — R53.1 WEAKNESS: ICD-10-CM

## 2020-09-21 DIAGNOSIS — M75.01 ADHESIVE CAPSULITIS OF RIGHT SHOULDER: Primary | ICD-10-CM

## 2020-09-21 DIAGNOSIS — M25.511 CHRONIC RIGHT SHOULDER PAIN: ICD-10-CM

## 2020-09-21 DIAGNOSIS — G89.29 CHRONIC RIGHT SHOULDER PAIN: ICD-10-CM

## 2020-09-21 DIAGNOSIS — M25.611 DECREASED RANGE OF MOTION OF RIGHT SHOULDER: ICD-10-CM

## 2020-09-21 PROCEDURE — 97110 THERAPEUTIC EXERCISES: CPT | Performed by: PHYSICAL THERAPIST

## 2020-09-21 PROCEDURE — 97140 MANUAL THERAPY 1/> REGIONS: CPT | Performed by: PHYSICAL THERAPIST

## 2020-09-21 NOTE — PROGRESS NOTES
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      Subjective     Objective          Tenderness     Right Shoulder  No tenderness in the acromion, biceps tendon (proximal), subacromial bursa and supraspinatus tendon.     Tests     Right Shoulder   Positive Hawkin's.       See Exercise, Manual, and Modality Logs for complete treatment.     Assessment/Plan  Notable decrease in inf glide at 90 degrees abduction; pt tolerated manual interventions well.  Pt was given a HEP with printout/TB.  Attempts at prone horiz abd painful but asymptomatic in standing with TB.  Pt educated on use of ice to control pain/inflammations.   Progress strengthening /stabilization /functional activity       Timed:  Manual Therapy:    8     mins  43692;  Therapeutic Exercise:    27     mins  52623;     Neuromuscular Aminta:    -    mins  37453;    Therapeutic Activity:     -     mins  84332;     Gait Training:      -     mins  57170;     Ultrasound:     -     mins  62215;    Iontophoresis                 -     mins 59420    Timed Treatment:   35   mins   Total Treatment:     45   mins      Maureen Langford PT  Physical Therapist  [place] License # [id number]

## 2020-09-21 NOTE — PATIENT INSTRUCTIONS
Access Code: [email or web address]   URL: [email or web address]   Date: 09/21/2020   Prepared by: Ni Langford     Exercises  Sidelying Shoulder External Rotation - 10 reps - 2 sets - 3 hold - 2x daily  Prone Shoulder Extension - Single Arm - 10 reps - 2 sets - 3 hold - 2x daily  Standing Row with Anchored Resistance - 10 reps - 2 sets - 3 hold - 1x daily  Seated Shoulder Inferior Glide - 3 reps - 1 sets - 20 hold - 2x daily  Circular Shoulder Pendulum with Table Support - 25 reps - 2 sets - 3x daily

## 2020-09-23 ENCOUNTER — TREATMENT (OUTPATIENT)
Dept: PHYSICAL THERAPY | Facility: CLINIC | Age: 50
End: 2020-09-23

## 2020-09-23 DIAGNOSIS — M25.511 CHRONIC RIGHT SHOULDER PAIN: ICD-10-CM

## 2020-09-23 DIAGNOSIS — G89.29 CHRONIC RIGHT SHOULDER PAIN: ICD-10-CM

## 2020-09-23 DIAGNOSIS — R53.1 WEAKNESS: ICD-10-CM

## 2020-09-23 DIAGNOSIS — M25.611 DECREASED RANGE OF MOTION OF RIGHT SHOULDER: ICD-10-CM

## 2020-09-23 DIAGNOSIS — M75.01 ADHESIVE CAPSULITIS OF RIGHT SHOULDER: Primary | ICD-10-CM

## 2020-09-23 PROCEDURE — 97140 MANUAL THERAPY 1/> REGIONS: CPT | Performed by: PHYSICAL THERAPIST

## 2020-09-23 PROCEDURE — 97110 THERAPEUTIC EXERCISES: CPT | Performed by: PHYSICAL THERAPIST

## 2020-09-23 NOTE — PROGRESS NOTES
Physical Therapy Daily Progress Note  Visit: 3    Subjective Nicki Fowler reports: R shoulder pain 2-3/10 today. Reports some N&T in R hand that started yesterday. Reports compliance with HEP     Objective   See Exercise, Manual, and Modality Logs for complete treatment. Added low trap setting exercise with Wall T and Wall Y, chest stretch on 1/2 foam roll. Progressed resistance with ER exercise.     Assessment/Plan: Compliant/cooperative with current rehab efforts.  Benefits from verbal/tactile cues to ensure correct exercise performance/technique, hold time and position. Plan details: Progress ROM / strengthening / stabilization / functional activity as tolerated     Manual Therapy:      10   mins  59392;  Therapeutic Exercise:       30   mins  34556;     Neuromuscular Aminta:         mins  61476;    Therapeutic Activity:           mins  56406;     Gait Training:            mins  41471;     Ultrasound:           mins  34963;    Electrical Stimulation:          mins  88067 ( );  Dry Needling           mins self-pay  Traction           mins 98219  Canalith Repositioning         mins 02613      Timed Treatment:  40    mins   Total Treatment:    50   mins    PARVIZ Lopez License #: [id number]    Physical Therapist

## 2020-09-28 ENCOUNTER — TREATMENT (OUTPATIENT)
Dept: PHYSICAL THERAPY | Facility: CLINIC | Age: 50
End: 2020-09-28

## 2020-09-28 DIAGNOSIS — M75.01 ADHESIVE CAPSULITIS OF RIGHT SHOULDER: Primary | ICD-10-CM

## 2020-09-28 DIAGNOSIS — M25.511 CHRONIC RIGHT SHOULDER PAIN: ICD-10-CM

## 2020-09-28 DIAGNOSIS — M25.611 DECREASED RANGE OF MOTION OF RIGHT SHOULDER: ICD-10-CM

## 2020-09-28 DIAGNOSIS — G89.29 CHRONIC RIGHT SHOULDER PAIN: ICD-10-CM

## 2020-09-28 DIAGNOSIS — R53.1 WEAKNESS: ICD-10-CM

## 2020-09-28 PROCEDURE — 97110 THERAPEUTIC EXERCISES: CPT | Performed by: PHYSICAL THERAPIST

## 2020-09-28 NOTE — PROGRESS NOTES
Physical Therapy Daily Progress Note  Visit: 4    Subjective Nicki Fowler reports: her shoulder has been feeling better     Objective   See Exercise, Manual, and Modality Logs for complete treatment. Progressed strengthening exercises.     Assessment/Plan:Compliant/cooperative with current rehab efforts.  Benefits from verbal/tactile cues to ensure correct exercise performance/technique, hold time and position. Plan details: Progress ROM / strengthening / stabilization / functional activity as tolerated     Manual Therapy:          mins  50849;  Therapeutic Exercise:     30     mins  20272;     Neuromuscular Aminta:         mins  59250;    Therapeutic Activity:           mins  99332;     Gait Training:            mins  18293;     Ultrasound:           mins  93638;    Electrical Stimulation:          mins  59973 ( );  Dry Needling           mins self-pay  Traction           mins 32675  Canalith Repositioning         mins 27277      Timed Treatment:  30    mins   Total Treatment:     40   mins    PARVIZ Lopez License #: [id number]    Physical Therapist

## 2020-10-02 ENCOUNTER — TREATMENT (OUTPATIENT)
Dept: PHYSICAL THERAPY | Facility: CLINIC | Age: 50
End: 2020-10-02

## 2020-10-02 DIAGNOSIS — G89.29 CHRONIC RIGHT SHOULDER PAIN: ICD-10-CM

## 2020-10-02 DIAGNOSIS — R53.1 WEAKNESS: ICD-10-CM

## 2020-10-02 DIAGNOSIS — M25.511 CHRONIC RIGHT SHOULDER PAIN: ICD-10-CM

## 2020-10-02 DIAGNOSIS — M75.01 ADHESIVE CAPSULITIS OF RIGHT SHOULDER: Primary | ICD-10-CM

## 2020-10-02 DIAGNOSIS — M25.611 DECREASED RANGE OF MOTION OF RIGHT SHOULDER: ICD-10-CM

## 2020-10-02 PROCEDURE — 97014 ELECTRIC STIMULATION THERAPY: CPT | Performed by: PHYSICAL THERAPIST

## 2020-10-02 PROCEDURE — 97110 THERAPEUTIC EXERCISES: CPT | Performed by: PHYSICAL THERAPIST

## 2020-10-02 PROCEDURE — 97140 MANUAL THERAPY 1/> REGIONS: CPT | Performed by: PHYSICAL THERAPIST

## 2020-10-02 NOTE — PROGRESS NOTES
Physical Therapy Daily Progress Note  Visit: 5    Subjective Nicki Fowler reports: She has a burning sensation along her R scapula that feels like its getting bigger. Reports the shoulder joint is feeling better.     Objective   See Exercise, Manual, and Modality Logs for complete treatment.     Assessment/Plan: soft tissue tightness and trigger points noted in R UT/ scapula area. Plan details: Progress ROM / strengthening / stabilization / functional activity as tolerated     Manual Therapy:   15       mins  88171;  Therapeutic Exercise:     8     mins  00047;     Neuromuscular Aminta:         mins  46059;    Therapeutic Activity:           mins  48684;     Gait Training:            mins  60479;     Ultrasound:           mins  48584;    Electrical Stimulation:   15       mins  33538 ( );  Dry Needling           mins self-pay  Traction           mins 60346  Canalith Repositioning         mins 53652      Timed Treatment:  23    mins   Total Treatment:   38     mins    Rosalee Garces PT  [place] License #: [id number]    Physical Therapist

## 2020-10-05 ENCOUNTER — TREATMENT (OUTPATIENT)
Dept: PHYSICAL THERAPY | Facility: CLINIC | Age: 50
End: 2020-10-05

## 2020-10-05 DIAGNOSIS — R53.1 WEAKNESS: ICD-10-CM

## 2020-10-05 DIAGNOSIS — M75.01 ADHESIVE CAPSULITIS OF RIGHT SHOULDER: Primary | ICD-10-CM

## 2020-10-05 DIAGNOSIS — M25.511 CHRONIC RIGHT SHOULDER PAIN: ICD-10-CM

## 2020-10-05 DIAGNOSIS — M25.611 DECREASED RANGE OF MOTION OF RIGHT SHOULDER: ICD-10-CM

## 2020-10-05 DIAGNOSIS — G89.29 CHRONIC RIGHT SHOULDER PAIN: ICD-10-CM

## 2020-10-05 PROCEDURE — 97014 ELECTRIC STIMULATION THERAPY: CPT | Performed by: PHYSICAL THERAPIST

## 2020-10-05 PROCEDURE — 97140 MANUAL THERAPY 1/> REGIONS: CPT | Performed by: PHYSICAL THERAPIST

## 2020-10-05 PROCEDURE — 97530 THERAPEUTIC ACTIVITIES: CPT | Performed by: PHYSICAL THERAPIST

## 2020-10-05 PROCEDURE — 97110 THERAPEUTIC EXERCISES: CPT | Performed by: PHYSICAL THERAPIST

## 2020-10-05 NOTE — PROGRESS NOTES
Re-Assessment / Re-Certification        Patient: Nicki Fowler   : 1970  Diagnosis/ICD-10 Code:  Adhesive capsulitis of right shoulder [M75.01]  Referring practitioner: Elizabeth West, *  Date of Initial Visit: Type: THERAPY  Noted: 2020  Today's Date: 10/5/2020  Patient seen for 6 sessions      Subjective:   Nicki Fowler reports: the treatment performed last session significantly decreased the pain she was feeling in her UT/ scapula area.   Subjective Questionnaire: QuickDASH: 38.64  (50 on initial evaluation)  Clinical Progress: improved  Home Program Compliance: Yes  Treatment has included: therapeutic exercise, manual therapy, electrical stimulation and moist heat    Subjective   Pain  Current pain ratin  At best pain ratin  At worst pain ratin  Location: R shoulder   Quality: needle-like, throbbing, radiating and discomfort  Relieving factors: heat  Aggravating factors: outstretched reach, repetitive movement and overhead activity    Objective   Active Range of Motion   Left Shoulder   Flexion: 160 degrees   Abduction: 155 degrees   External rotation BTH: WFL  Internal rotation BTB: WFL    Right Shoulder   Flexion:  155 degrees (140 degrees on initial evaluation  Abduction: 147 degrees (140 degrees on initial evaluation)  External rotation BTH: WFL  Internal rotation BTB: WFL     Strength/Myotome Testing     Left Shoulder      Planes of Motion   Flexion: 5   Abduction: 5   External rotation at 0°: 5   Internal rotation at 0°: 5     Right Shoulder      Planes of Motion   Flexion: 4+   Abduction: 4+   External rotation at 0°: 4   Internal rotation at 0°: 4      Goals  Plan Goals: SHORT TERM GOALS: 6 visits  1. Patient to be compliant with HEP and no diff. with sleeping Progressing   2. Increased (R) UE strength to 4+/5 with no pain> 5/10 to allow for household and work activities. Progressing  3. Pt demonstrates improved posture in sitting and standing with minimal-no cues during  treatment session Met    LONG TERM GOALS: 12 visits  1. Pt score <30% perceived disability on DASH  Progressing  2. Pt. to exhibit (R) shoulder AROM to WFL (> 150° flex/abd. to allow for reaching overhead and behind back without pain limiting function Progressing  3. Pt to exhibit 5/5 UE strength to allow for pushing/pulling and lifting >5 #to occur with pain <2/10 Progressing   4. Pt able to reach overhead and lift 10# (B)  to allow for return to doing work around home. Progressing  Assessment/Plan  Progress toward previous goals: Partially Met   Pt is responding well to treatment and progressing toward goals. Pt would benefit to continue to progress strength and ROM and decrease pain      Recommendations: Continue as planned  Timeframe: 1 month  Prognosis to achieve goals: good    PT Signature: Rosalee Garces, PARVIZ      Based upon review of the patient's progress and continued therapy plan, it is my medical opinion that Nicki Fowler should continue physical therapy treatment at [facility]  [address] [place] [id number] [phone].    Signature: __________________________________  Elizabeth West MD    Manual Therapy:  20       mins  67542;  Therapeutic Exercise:    8     mins  04346;     Neuromuscular Aminta:        mins  40318;    Therapeutic Activity:     10     mins  95231;     Gait Training:           mins  73776;     Ultrasound:          mins  20919;    Electrical Stimulation:  15       mins  63179 ( );  Dry Needling          mins self-pay    Timed Treatment:  38    mins   Total Treatment:    53    mins

## 2020-10-12 ENCOUNTER — TREATMENT (OUTPATIENT)
Dept: PHYSICAL THERAPY | Facility: CLINIC | Age: 50
End: 2020-10-12

## 2020-10-12 DIAGNOSIS — M25.611 DECREASED RANGE OF MOTION OF RIGHT SHOULDER: ICD-10-CM

## 2020-10-12 DIAGNOSIS — M75.01 ADHESIVE CAPSULITIS OF RIGHT SHOULDER: Primary | ICD-10-CM

## 2020-10-12 DIAGNOSIS — R53.1 WEAKNESS: ICD-10-CM

## 2020-10-12 DIAGNOSIS — G89.29 CHRONIC RIGHT SHOULDER PAIN: ICD-10-CM

## 2020-10-12 DIAGNOSIS — M25.511 CHRONIC RIGHT SHOULDER PAIN: ICD-10-CM

## 2020-10-12 PROCEDURE — 97110 THERAPEUTIC EXERCISES: CPT | Performed by: PHYSICAL THERAPIST

## 2020-10-12 PROCEDURE — 97530 THERAPEUTIC ACTIVITIES: CPT | Performed by: PHYSICAL THERAPIST

## 2020-10-12 NOTE — PROGRESS NOTES
Physical Therapy Daily Progress Note        Nicki Fowler reports: right shoulder is feeling better in regards to regular everyday activities.  Will bother me when running and with certain positions and movements.  Not as painful at night when trying to sleep.    Subjective     Objective   See Exercise, Manual, and Modality Logs for complete treatment.   Exercise rationale/ pain free exercise performance  Anatomy and structure of affected musculature  Posture/Postural awareness  Lumbar support  Sleeping positions with pillows  Alternate exercise positions  Verbal/Tactile cues to ensure correct exercise performance/technique    Added: 4 way AAROM pulley's, prone rows, T, Y, levator stretch      Assessment/Plan  Positive response to manual intervention in regards to improved triggerpoint and R UT tightness. Able to progress exercises without increased symptoms, though will benefit from continued scap stabilization strengthening.   Benefits from verbal/tactile cues to ensure proper posture, exercise technique and performance for affected musculature. Should improve with continued PT intervention.      Progress strengthening /stabilization /functional activity as symptoms allow. Continue manual interventions.           Manual Therapy:         mins  37056;  Therapeutic Exercise:     28    mins  82936;     Neuromuscular Aminta:        mins  74040;    Therapeutic Activity:    14      mins  85973;     Gait Training:           mins  72721;     Ultrasound:          mins  08166;    Electrical Stimulation:         mins  16677 ( );      Timed Treatment:  42    mins   Total Treatment:    42    mins    [place] PTA    Physical Therapist Assistant [place] [id number]

## 2020-10-14 ENCOUNTER — TREATMENT (OUTPATIENT)
Dept: PHYSICAL THERAPY | Facility: CLINIC | Age: 50
End: 2020-10-14

## 2020-10-14 DIAGNOSIS — M25.611 DECREASED RANGE OF MOTION OF RIGHT SHOULDER: ICD-10-CM

## 2020-10-14 DIAGNOSIS — R53.1 WEAKNESS: ICD-10-CM

## 2020-10-14 DIAGNOSIS — G89.29 CHRONIC RIGHT SHOULDER PAIN: ICD-10-CM

## 2020-10-14 DIAGNOSIS — M25.511 CHRONIC RIGHT SHOULDER PAIN: ICD-10-CM

## 2020-10-14 DIAGNOSIS — M75.01 ADHESIVE CAPSULITIS OF RIGHT SHOULDER: Primary | ICD-10-CM

## 2020-10-14 PROCEDURE — 97110 THERAPEUTIC EXERCISES: CPT | Performed by: PHYSICAL THERAPIST

## 2020-10-14 PROCEDURE — 97140 MANUAL THERAPY 1/> REGIONS: CPT | Performed by: PHYSICAL THERAPIST

## 2020-10-14 NOTE — PATIENT INSTRUCTIONS
Access Code: [email or web address]   URL: [email or web address]   Date: 10/14/2020   Prepared by: Ni Langford     Exercises  Single Arm Doorway Pec Stretch at 90 Degrees Abduction - 3 reps - 1 sets - 20 hold - 1x daily  Prone Shoulder Horizontal Abduction - 10 reps - 2 sets - 3 hold - 1x daily  Prone Single Arm Shoulder Y - 10 reps - 2 sets - 3 hold - 1x daily  [place] on Foam Roll - 10 reps - 1 sets - 5 hold - 1x daily  Hooklying Shoulder Flexion Extension AROM on Foam Roll - 10 reps - 1 sets - 5 hold - 1x daily  Supine Chest Stretch on Foam Roll - 3 reps - 1 sets - 20 hold - 1x daily

## 2020-10-14 NOTE — PROGRESS NOTES
Physical Therapy Daily Progress Note    Visit # : 8  Nicki Fowler reports: my shoulder is doing well but I still have some pain when I sleep if I turn onto my R side.     Subjective     Objective   See Exercise, Manual, and Modality Logs for complete treatment.     Assessment/Plan  Pt exhibits improving ROM but continues to exhibit light compensatory scap protract with supine abd secondary to tight pec and decreased GH mobility.  Full PROM into  ER at 90/90.  Pt deferred modalities and Pt was issued updated HEP printout to facilitate compliance and recall with ex progressions today.  Progress strengthening /stabilization /functional activity       Timed:  Manual Therapy:    8     mins  65571;  Therapeutic Exercise:    35     mins  55347;     Neuromuscular Aminta:    -    mins  80550;    Therapeutic Activity:     -     mins  23385;     Gait Training:      -     mins  22221;     Ultrasound:     -     mins  63812;    Iontophoresis                 -     mins 58932    Timed Treatment:   43   mins   Total Treatment:     43   mins      Maureen Langford PT  Physical Therapist  [place] License # [id number]

## 2020-10-19 ENCOUNTER — TREATMENT (OUTPATIENT)
Dept: PHYSICAL THERAPY | Facility: CLINIC | Age: 50
End: 2020-10-19

## 2020-10-19 DIAGNOSIS — M75.01 ADHESIVE CAPSULITIS OF RIGHT SHOULDER: Primary | ICD-10-CM

## 2020-10-19 DIAGNOSIS — M25.511 CHRONIC RIGHT SHOULDER PAIN: ICD-10-CM

## 2020-10-19 DIAGNOSIS — G89.29 CHRONIC RIGHT SHOULDER PAIN: ICD-10-CM

## 2020-10-19 DIAGNOSIS — M25.611 DECREASED RANGE OF MOTION OF RIGHT SHOULDER: ICD-10-CM

## 2020-10-19 PROCEDURE — 97140 MANUAL THERAPY 1/> REGIONS: CPT | Performed by: PHYSICAL THERAPIST

## 2020-10-19 PROCEDURE — 97530 THERAPEUTIC ACTIVITIES: CPT | Performed by: PHYSICAL THERAPIST

## 2020-10-19 PROCEDURE — 97110 THERAPEUTIC EXERCISES: CPT | Performed by: PHYSICAL THERAPIST

## 2020-10-19 NOTE — PROGRESS NOTES
"Physical Therapy Daily Progress Note      Nicki Fowler reports: shoulder a little \"achey\" today, feels it is from sleeping on it last night.    Subjective     Objective   See Exercise, Manual, and Modality Logs for complete treatment.   -increased extension pulldown weight to 10 lbs on cable machine; ER 2 #  -tactile cues for scapular retraction/depression vs elevation    Assessment/Plan  Compliant/Cooperative with rehab efforts.  Subjectively reports no increased symptoms or discomfort with therapeutic exercise today.  Able to perform increased exercise/functional activity without increased right shoulder discomfort  Benefits from verbal/tactile cues to ensure proper exercise technique and positioning but developing good habit for scapular retraction/depression with RTC and scap stabilization activities.      Progress per Plan of Care toward all goals.           Manual Therapy:    12     mins  64142;  Therapeutic Exercise:    26     mins  25054;     Neuromuscular Aminta:        mins  96010;    Therapeutic Activity:     10     mins  41122;     Gait Training:           mins  58512;     Ultrasound:          mins  52684;    Electrical Stimulation:         mins  13260 ( );      Timed Treatment: 48     mins   Total Treatment:     48   mins    [place] PTA    Physical Therapist Assistant [place] [id number]    "

## 2020-10-22 ENCOUNTER — TREATMENT (OUTPATIENT)
Dept: PHYSICAL THERAPY | Facility: CLINIC | Age: 50
End: 2020-10-22

## 2020-10-22 DIAGNOSIS — M25.511 CHRONIC RIGHT SHOULDER PAIN: ICD-10-CM

## 2020-10-22 DIAGNOSIS — M75.01 ADHESIVE CAPSULITIS OF RIGHT SHOULDER: Primary | ICD-10-CM

## 2020-10-22 DIAGNOSIS — R53.1 WEAKNESS: ICD-10-CM

## 2020-10-22 DIAGNOSIS — G89.29 CHRONIC RIGHT SHOULDER PAIN: ICD-10-CM

## 2020-10-22 DIAGNOSIS — M25.611 DECREASED RANGE OF MOTION OF RIGHT SHOULDER: ICD-10-CM

## 2020-10-22 PROCEDURE — 97140 MANUAL THERAPY 1/> REGIONS: CPT | Performed by: PHYSICAL THERAPIST

## 2020-10-22 PROCEDURE — 97110 THERAPEUTIC EXERCISES: CPT | Performed by: PHYSICAL THERAPIST

## 2020-10-22 NOTE — PROGRESS NOTES
Physical Therapy Daily Progress Note  Visit: 10    Subjective Nicki Fowler reports: her shoulder is much better with minimal discomfort with certain movement and if she sleeps on R side.     Objective   See Exercise, Manual, and Modality Logs for complete treatment. Reviewed HEP     Assessment/Plan: Pt has progressed well. Will hold for 2-3 weeks to continue HEP and then see next scheduled visit to assess if symptoms remain resolved.     Manual Therapy:   30      mins  03502;  Therapeutic Exercise:    10      mins  01664;     Neuromuscular Aminta:         mins  03321;    Therapeutic Activity:           mins  22723;     Gait Training:            mins  36194;     Ultrasound:           mins  05722;    Electrical Stimulation:          mins  78822 ( );  Dry Needling           mins self-pay  Traction           mins 43270  Canalith Repositioning         mins 92073      Timed Treatment:  40   mins   Total Treatment:   40     mins    Rosalee Garces PT  [place] License #: [id number]    Physical Therapist

## 2020-11-03 ENCOUNTER — OFFICE VISIT (OUTPATIENT)
Dept: GASTROENTEROLOGY | Facility: CLINIC | Age: 50
End: 2020-11-03

## 2020-11-03 VITALS — TEMPERATURE: 97.8 F | WEIGHT: 112.4 LBS | BODY MASS INDEX: 20.68 KG/M2 | HEIGHT: 62 IN

## 2020-11-03 DIAGNOSIS — K58.0 IRRITABLE BOWEL SYNDROME WITH DIARRHEA: Primary | ICD-10-CM

## 2020-11-03 DIAGNOSIS — K63.5 HYPERPLASTIC COLONIC POLYP, UNSPECIFIED PART OF COLON: ICD-10-CM

## 2020-11-03 PROCEDURE — 99214 OFFICE O/P EST MOD 30 MIN: CPT | Performed by: NURSE PRACTITIONER

## 2020-11-03 RX ORDER — NORTRIPTYLINE HYDROCHLORIDE 10 MG/1
10 CAPSULE ORAL NIGHTLY
Qty: 30 CAPSULE | Refills: 11 | Status: SHIPPED | OUTPATIENT
Start: 2020-11-03 | End: 2021-11-12

## 2020-11-03 NOTE — PROGRESS NOTES
Chief Complaint   Patient presents with   • Irritable Bowel Syndrome   • Med Refill       Nicki Fowler is a  50 y.o. female here for a follow up visit for IBS.    HPI  51 yo f presents today for follow up visit for IBS-D. She is a patient of Dr. Mares. She was last seen in the office on 19. She has hx IBS-D and admits she does well with Pamelor 10 mg QHS. She admits her bowels are moving well. She denies any dysphagia, reflux, abd pain, N&V, diarrhea, constipation, rectal bleeding or melena. She admits her appetite is good and her weight is stable. Next screening colonoscopy due 2023.   Past Medical History:   Diagnosis Date   • Anxiety and depression    • Arthritis    • Cervical disc disorder    • Closed fracture of coccyx (CMS/[facility]) 2013   • Colon polyp 2018    Dr. Mares removed during colonoscopy   • Dizziness     constant x 3 weeks   • Headache    • History of seasonal allergies    • IBS (irritable bowel syndrome)    • Lactose intolerance early     dairy causes bloating and gas   • Low back pain    • Lumbosacral disc disease    • Meniere's disease    • Peripheral neuropathy        Past Surgical History:   Procedure Laterality Date   • ABDOMINAL SURGERY  10/10/1996       • ANTERIOR CERVICAL DISCECTOMY W/ FUSION N/A 2018    Procedure: C5-6 ANTERIOR CERVICAL DISCECTOMY FUSION WITH INSTRUMENTATION;  Surgeon: Porter Kendrick MD;  Location: [facility];  Service: Neurosurgery   •  SECTION     • COLONOSCOPY N/A 2018    One 6 mm polyp in the ascending colon, NBIH.  PATH: Hyperplastic polyp    • EPIDURAL BLOCK      Series of 4 block for cervical ddd   • LASIK     • WISDOM TOOTH EXTRACTION         Scheduled Meds:    Continuous Infusions:No current facility-administered medications for this visit.       PRN Meds:.    Allergies   Allergen Reactions   • Cinnamon Other (See Comments) and Shortness Of Breath   • Shellfish-Derived Products Hives       Social History      Socioeconomic History   • Marital status:      Spouse name: Not on file   • Number of children: 3   • Years of education: BA   • Highest education level: Not on file   Occupational History   • Occupation:    Tobacco Use   • Smoking status: Former Smoker     Packs/day: 2.00     Years: 20.00     Pack years: 40.00     Types: Cigarettes     Start date:      Quit date:      Years since quittin.8   • Smokeless tobacco: Never Used   • Tobacco comment: stopped 5 years for pregnancies   Substance and Sexual Activity   • Alcohol use: No     Comment: Social   • Drug use: No   • Sexual activity: Yes     Partners: Male     Birth control/protection: Other   Social History Narrative    LIVES WITH  AND CHILDREN       Family History   Problem Relation Age of Onset   • Cancer Mother         Non-Hodgkins Lymphoma   • Hypertension Mother    • Atrial fibrillation Mother    • Arthritis Mother         DDD   • Heart disease Mother         A-fib   • Hyperlipidemia Mother    • Thyroid disease Mother         Hypothyroid   • Cancer Father         Hodgkins Lymphoma and Non-Hodgkins Lymphoma at two separate times of his life   • Heart attack Father    • Stroke Father    • Hypertension Brother    • Arthritis Brother         DDD   • Hyperlipidemia Brother    • Asthma Daughter    • Arthritis Brother         DDD   • Heart disease Maternal Aunt         Congestive Heart Failure   • Malig Hyperthermia Neg Hx        Review of Systems   Constitutional: Negative for appetite change, chills, diaphoresis, fatigue, fever and unexpected weight change.   HENT: Negative for nosebleeds, postnasal drip, sore throat, trouble swallowing and voice change.    Respiratory: Negative for cough, choking, chest tightness, shortness of breath and wheezing.    Cardiovascular: Negative for chest pain, palpitations and leg swelling.   Gastrointestinal: Negative for abdominal distention, abdominal pain, anal bleeding, blood in  stool, constipation, diarrhea, nausea, rectal pain and vomiting.   Endocrine: Negative for polydipsia, polyphagia and polyuria.   Musculoskeletal: Negative for gait problem.   Skin: Negative for rash and wound.   Allergic/Immunologic: Negative for food allergies.   Neurological: Negative for dizziness, speech difficulty and light-headedness.   Psychiatric/Behavioral: Negative for confusion, self-injury, sleep disturbance and suicidal ideas.       Vitals:    11/03/20 1516   Temp: 97.8 °F (36.6 °C)       Physical Exam  Constitutional:       General: She is not in acute distress.     Appearance: She is well-developed. She is not ill-appearing.   HENT:      Head: Normocephalic.   Eyes:      Pupils: Pupils are equal, round, and reactive to light.   Cardiovascular:      Rate and Rhythm: Normal rate and regular rhythm.      Heart sounds: Normal heart sounds.   Pulmonary:      Effort: Pulmonary effort is normal.      Breath sounds: Normal breath sounds.   Abdominal:      General: Bowel sounds are normal. There is no distension.      Palpations: Abdomen is soft. There is no mass.      Tenderness: There is no abdominal tenderness. There is no guarding or rebound.      Hernia: No hernia is present.   Musculoskeletal: Normal range of motion.   Skin:     General: Skin is warm and dry.   Neurological:      Mental Status: She is alert and oriented to person, place, and time.   Psychiatric:         Speech: Speech normal.         Behavior: Behavior normal.         Judgment: Judgment normal.         No radiology results for the last 7 days     Assessment & Plan     1. Irritable bowel syndrome with diarrhea    2. Hyperplastic colonic polyp, unspecified part of colon      IBS-D seems well controlled with Pamelor at night. Continue it for now. Bowels are moving well. Patient to call the office with any issues. Follow up with me in 1 year. Next colonoscopy due 4/2023.

## 2021-02-15 ENCOUNTER — APPOINTMENT (OUTPATIENT)
Dept: WOMENS IMAGING | Facility: HOSPITAL | Age: 51
End: 2021-02-15

## 2021-10-13 ENCOUNTER — OFFICE VISIT (OUTPATIENT)
Dept: GASTROENTEROLOGY | Facility: CLINIC | Age: 51
End: 2021-10-13

## 2021-10-13 VITALS — WEIGHT: 112 LBS | HEIGHT: 62 IN | TEMPERATURE: 97.5 F | BODY MASS INDEX: 20.61 KG/M2

## 2021-10-13 DIAGNOSIS — K58.0 IRRITABLE BOWEL SYNDROME WITH DIARRHEA: Primary | ICD-10-CM

## 2021-10-13 DIAGNOSIS — Z86.010 PERSONAL HISTORY OF COLONIC POLYPS: ICD-10-CM

## 2021-10-13 PROCEDURE — 99213 OFFICE O/P EST LOW 20 MIN: CPT | Performed by: NURSE PRACTITIONER

## 2021-10-13 RX ORDER — PROGESTERONE 200 MG/1
200 CAPSULE ORAL DAILY
COMMUNITY
End: 2022-12-08

## 2021-11-12 RX ORDER — NORTRIPTYLINE HYDROCHLORIDE 10 MG/1
CAPSULE ORAL
Qty: 90 CAPSULE | Refills: 3 | Status: SHIPPED | OUTPATIENT
Start: 2021-11-12 | End: 2022-12-08

## 2021-12-24 ENCOUNTER — APPOINTMENT (OUTPATIENT)
Dept: CT IMAGING | Facility: HOSPITAL | Age: 51
End: 2021-12-24

## 2021-12-24 ENCOUNTER — HOSPITAL ENCOUNTER (EMERGENCY)
Facility: HOSPITAL | Age: 51
Discharge: HOME | End: 2021-12-24
Attending: EMERGENCY MEDICINE | Admitting: EMERGENCY MEDICINE

## 2021-12-24 VITALS
TEMPERATURE: 98 F | HEIGHT: 62 IN | OXYGEN SATURATION: 98 % | BODY MASS INDEX: 21.53 KG/M2 | WEIGHT: 117 LBS | SYSTOLIC BLOOD PRESSURE: 130 MMHG | HEART RATE: 72 BPM | RESPIRATION RATE: 18 BRPM | DIASTOLIC BLOOD PRESSURE: 78 MMHG

## 2021-12-24 DIAGNOSIS — M51.36 BULGING OF LUMBAR INTERVERTEBRAL DISC: ICD-10-CM

## 2021-12-24 DIAGNOSIS — M54.32 SCIATICA OF LEFT SIDE: Primary | ICD-10-CM

## 2021-12-24 DIAGNOSIS — M51.36 DEGENERATIVE DISC DISEASE, LUMBAR: ICD-10-CM

## 2021-12-24 PROCEDURE — 99283 EMERGENCY DEPT VISIT LOW MDM: CPT

## 2021-12-24 PROCEDURE — 99284 EMERGENCY DEPT VISIT MOD MDM: CPT | Performed by: EMERGENCY MEDICINE

## 2021-12-24 PROCEDURE — 72131 CT LUMBAR SPINE W/O DYE: CPT

## 2021-12-24 PROCEDURE — 25010000002 METHYLPREDNISOLONE PER 40 MG: Performed by: EMERGENCY MEDICINE

## 2021-12-24 PROCEDURE — 25010000002 KETOROLAC TROMETHAMINE PER 15 MG: Performed by: EMERGENCY MEDICINE

## 2021-12-24 PROCEDURE — 96372 THER/PROPH/DIAG INJ SC/IM: CPT

## 2021-12-24 RX ORDER — CYCLOBENZAPRINE HCL 10 MG
10 TABLET ORAL ONCE
Status: COMPLETED | OUTPATIENT
Start: 2021-12-24 | End: 2021-12-24

## 2021-12-24 RX ORDER — PREDNISONE 10 MG/1
TABLET ORAL
Qty: 21 TABLET | Refills: 0 | Status: SHIPPED | OUTPATIENT
Start: 2021-12-25 | End: 2022-01-11

## 2021-12-24 RX ORDER — OXYCODONE HYDROCHLORIDE AND ACETAMINOPHEN 5; 325 MG/1; MG/1
2 TABLET ORAL ONCE
Status: COMPLETED | OUTPATIENT
Start: 2021-12-24 | End: 2021-12-24

## 2021-12-24 RX ORDER — KETOROLAC TROMETHAMINE 10 MG/1
10 TABLET, FILM COATED ORAL EVERY 6 HOURS PRN
Qty: 20 TABLET | Refills: 0 | Status: SHIPPED | OUTPATIENT
Start: 2021-12-24 | End: 2022-03-28

## 2021-12-24 RX ORDER — METHOCARBAMOL 750 MG/1
1500 TABLET, FILM COATED ORAL 3 TIMES DAILY
Qty: 42 TABLET | Refills: 0 | Status: SHIPPED | OUTPATIENT
Start: 2021-12-24 | End: 2021-12-31

## 2021-12-24 RX ORDER — OXYCODONE HYDROCHLORIDE AND ACETAMINOPHEN 5; 325 MG/1; MG/1
1 TABLET ORAL EVERY 6 HOURS PRN
Qty: 20 TABLET | Refills: 0 | Status: SHIPPED | OUTPATIENT
Start: 2021-12-24 | End: 2022-03-28

## 2021-12-24 RX ORDER — METHYLPREDNISOLONE SODIUM SUCCINATE 40 MG/ML
80 INJECTION, POWDER, LYOPHILIZED, FOR SOLUTION INTRAMUSCULAR; INTRAVENOUS ONCE
Status: COMPLETED | OUTPATIENT
Start: 2021-12-24 | End: 2021-12-24

## 2021-12-24 RX ORDER — KETOROLAC TROMETHAMINE 30 MG/ML
60 INJECTION, SOLUTION INTRAMUSCULAR; INTRAVENOUS ONCE
Status: COMPLETED | OUTPATIENT
Start: 2021-12-24 | End: 2021-12-24

## 2021-12-24 RX ADMIN — CYCLOBENZAPRINE 10 MG: 10 TABLET, FILM COATED ORAL at 11:24

## 2021-12-24 RX ADMIN — METHYLPREDNISOLONE SODIUM SUCCINATE 80 MG: 40 INJECTION, POWDER, FOR SOLUTION INTRAMUSCULAR; INTRAVENOUS at 11:26

## 2021-12-24 RX ADMIN — OXYCODONE AND ACETAMINOPHEN 2 TABLET: 5; 325 TABLET ORAL at 13:03

## 2021-12-24 RX ADMIN — KETOROLAC TROMETHAMINE 60 MG: 30 INJECTION, SOLUTION INTRAMUSCULAR; INTRAVENOUS at 11:25

## 2021-12-24 NOTE — DISCHARGE INSTRUCTIONS
You have sciatica.  Your CT shows degenerative disc disease as well as herniated disc most prominent at L3 and L4.  Medications as directed. Alternate ice and heat as above. Gentle stretching. Follow-up with spine surgeon and pain management as directed. Return to ED for medical emergencies.

## 2021-12-24 NOTE — ED PROVIDER NOTES
Subjective   Nicki Fowler is a 51-year-old white female who present secondary to left lower extremity pain x4 to 5 days.  Patient has had episodic issues with low back pain in the past.  She has known degenerative disease in C-spine as well as L-spine.  Patient is status post C-spine surgery.  Patient been experiencing pain left buttock and left leg for 4 to 5 days.  No initiating injury.  No slip trip or fall.  No heavy lifting.  Patient has been taking over-the-counter pain medication without any improvement.  Today she took a hydrocodone left over from her C-spine surgery in 2018.  This did not relieve patient's pain.  Thus she elected to come to the ED for evaluation.      History provided by:  Patient      Review of Systems   Constitutional: Negative.  Negative for fever.   HENT: Negative for rhinorrhea.    Eyes: Negative.  Negative for redness.   Respiratory: Negative for cough.    Cardiovascular: Negative for chest pain.   Gastrointestinal: Positive for abdominal pain (History of IBS).   Endocrine: Negative.    Genitourinary: Negative.  Negative for difficulty urinating.   Musculoskeletal: Positive for back pain.   Skin: Negative.  Negative for color change.   Neurological: Positive for headaches. Negative for syncope.   Hematological: Negative.    Psychiatric/Behavioral: The patient is nervous/anxious (History of anxiety and depression).    All other systems reviewed and are negative.      Past Medical History:   Diagnosis Date   • Anxiety and depression    • Arthritis    • Cervical disc disorder    • Closed fracture of coccyx (HCC) 09/2013   • Colon polyp 4/30/2018    Dr. Mares removed during colonoscopy   • Dizziness     constant x 3 weeks   • Headache    • History of seasonal allergies    • IBS (irritable bowel syndrome)    • Lactose intolerance early 20's    dairy causes bloating and gas   • Low back pain    • Lumbosacral disc disease    • Meniere's disease    • Peripheral neuropathy        Allergies    Allergen Reactions   • Cinnamon Other (See Comments) and Shortness Of Breath   • Shellfish-Derived Products Hives       Past Surgical History:   Procedure Laterality Date   • ABDOMINAL SURGERY  10/10/1996       • ANTERIOR CERVICAL DISCECTOMY W/ FUSION N/A 2018    Procedure: C5-6 ANTERIOR CERVICAL DISCECTOMY FUSION WITH INSTRUMENTATION;  Surgeon: Porter Kendrick MD;  [facility];  Service: Neurosurgery   •  SECTION     • COLONOSCOPY N/A 2018    One 6 mm polyp in the ascending colon, NBIH.  PATH: Hyperplastic polyp    • EPIDURAL BLOCK      Series of 4 block for cervical ddd   • LASIK     • WISDOM TOOTH EXTRACTION         Family History   Problem Relation Age of Onset   • Cancer Mother         Non-Hodgkins Lymphoma   • Hypertension Mother    • Atrial fibrillation Mother    • Arthritis Mother         DDD   • Heart disease Mother         A-fib   • Hyperlipidemia Mother    • Thyroid disease Mother         Hypothyroid   • Cancer Father         Hodgkins Lymphoma and Non-Hodgkins Lymphoma at two separate times of his life   • Heart attack Father    • Stroke Father    • Hypertension Brother    • Arthritis Brother         DDD   • Hyperlipidemia Brother    • Asthma Daughter    • Arthritis Brother         DDD   • Heart disease Maternal Aunt         Congestive Heart Failure   • Malig Hyperthermia Neg Hx        Social History     Socioeconomic History   • Marital status:    • Number of children: 3   • Years of education: BA   Tobacco Use   • Smoking status: Former Smoker     Packs/day: 2.00     Years: 20.00     Pack years: 40.00     Types: Cigarettes     Start date:      Quit date:      Years since quittin.9   • Smokeless tobacco: Never Used   • Tobacco comment: stopped 5 years for pregnancies   Substance and Sexual Activity   • Alcohol use: No     Comment: Social   • Drug use: No   • Sexual activity: Yes     Partners: Male     Birth control/protection: Other            Objective   Physical Exam  Vitals and nursing note reviewed.   Constitutional:       General: She is not in acute distress.     Appearance: Normal appearance. She is well-developed and normal weight. She is not ill-appearing, toxic-appearing or diaphoretic.      Comments: 51-year-old white female laying in bed.  Patient appears in good overall health.  She does appear in some discomfort.  Vital signs unremarkable.  Patient friendly and cooperative.   HENT:      Head: Normocephalic and atraumatic.      Right Ear: Tympanic membrane, ear canal and external ear normal.      Left Ear: Tympanic membrane, ear canal and external ear normal.      Nose: Nose normal.      Mouth/Throat:      Mouth: Mucous membranes are moist.      Pharynx: Oropharynx is clear.   Eyes:      Extraocular Movements: Extraocular movements intact.      Conjunctiva/sclera: Conjunctivae normal.      Pupils: Pupils are equal, round, and reactive to light.   Cardiovascular:      Rate and Rhythm: Normal rate and regular rhythm.      Pulses: Normal pulses.      Heart sounds: Normal heart sounds. No murmur heard.  No friction rub. No gallop.    Pulmonary:      Effort: Pulmonary effort is normal.      Breath sounds: Normal breath sounds.   Abdominal:      General: Bowel sounds are normal. There is no distension.      Palpations: Abdomen is soft. There is no mass.      Tenderness: There is no abdominal tenderness. There is no guarding or rebound.      Hernia: No hernia is present.   Musculoskeletal:      Cervical back: Normal, normal range of motion and neck supple.      Thoracic back: Normal.      Lumbar back: Decreased range of motion (Limited range of motion).   Skin:     General: Skin is warm and dry.      Capillary Refill: Capillary refill takes less than 2 seconds.   Neurological:      General: No focal deficit present.      Mental Status: She is alert and oriented to person, place, and time.      Deep Tendon Reflexes: Reflexes are normal and  symmetric.   Psychiatric:         Mood and Affect: Mood normal.         Behavior: Behavior normal.         Procedures           ED Course  ED Course as of 12/24/21 1706   Fri Dec 24, 2021   1055 Left-sided sciatica for 5 days. No improvement with NSAIDs. Patient had 1 Norco left over from C-spine surgery 3 years ago. This did not provide any relief. Obtain CT of L-spine. Giving steroids, NSAIDs and muscle relaxants. [SS]   1249 CT shows multilevel degenerative disease with associated bulging disc.  These are most prominent at the L3-L4 region with bulging to the left side and encroachment of foramen.  This is the source of patient's pain.  Symptoms mildly improved after Toradol, Solu-Medrol and Flexeril.  Patient still having significant pain.  Will give p.o. pain medications.  Giving neurosurgery as well as pain management for follow-up.  Discussed at length with patient and her spouse diagnoses, CT findings, treatment and follow-up.  They knowledge understanding.  All questions answered.  Will DC home.[id number]-[id number]-Percocet    Epic flagged potential interaction between Flexeril and nortriptyline.  Thus changing muscle relaxant to methocarbamol. [SS]      ED Course User Index  [SS] Porter Gordon MD      CT Lumbar Spine Without Contrast    Result Date: 12/24/2021  Narrative: CT Spine Lumbar WO INDICATION:  Low back pain for 5 days. Pain radiating down the left leg for 4 days. No specific injury. TECHNIQUE: CT of the lumbar spine without IV contrast. Coronal and sagittal reconstructions were obtained.  Radiation dose reduction techniques included automated exposure control or exposure modulation based on body size. Count of known CT and cardiac nuc med studies performed in previous 12 months: 0. COMPARISON:  None available. FINDINGS: No acute fractures. Vertebral body heights and alignment are normally maintained. There is posterior and left foraminal disc bulging at  multiple levels. This produces moderate left foraminal stenosis at L3-4 and L4-5. Mild to moderate multilevel facet degeneration. Sacrum intact. Paravertebral soft tissues are unremarkable.     Impression: 1. No acute lumbar spine injury. 2. Multilevel degenerative changes, detailed above. There is at least moderate left foraminal stenosis at L3-4 and L4-5. If clinical symptoms persist, further evaluation with nonemergent noncontrast lumbar spine MRI would be recommended. Signer Name: Ricardo Cuellar MD  Signed: 12/24/2021 12:22 PM  Workstation Name: [id number]  [facility]         My differential diagnosis for back pain includes but is not limited to:  Musculoskeletal strain, contusion, retroperitoneal hematoma, disc protrusion, vertebral fracture, transverse process fracture, rib fracture, facet syndrome, sacroiliac joint strain, sciatica, renal injury, splenic injury, pancreatic injury, osteoarthritis, lumbar spondylosis, spinal stenosis, ankylosing spondylitis, sacroiliac joint inflammation, pancreatitis, perforated peptic ulcer, diverticulitis, endometriosis, chronic PID, epidural abscess, osteomyelitis, retroperitoneal abscess, pyelonephritis, pneumonia, subphrenic abscess, tuberculosis, neurofibroma, meningioma, multiple myeloma, lymphoma, metastatic cancer, primary cancer, AAA, aortic dissection, spinal ischemia, referred pain, ureterolithiasis       My diagnosis for lower extremity pain and injury includes but is not limited to hip fracture, femur fracture, hip dislocation, hip contusion, hip sprain, hip strain, pelvic fracture, knee sprain, patella dislocation, knee dislocation, internal derangement of knee, fractures of the femur, tibia, fibula, ankle, foot and digits, ankle sprain, ankle dislocation, Lisfranc fracture, fracture dislocations of the digits, pulmonary embolism, claudication, peripheral vascular disease, gout, osteoarthritis, rheumatoid arthritis, bursitis, septic  joint, poly-rheumatica, polyarthralgia and other inflammatory or infectious disease processes.                                   MDM    Final diagnoses:   Sciatica of left side   Bulging of lumbar intervertebral disc   Degenerative disc disease, lumbar       ED Disposition  ED Disposition     ED Disposition Condition Comment    Discharge Stable           Fernando Cadena MD  [address]  [facility] [address]  [facility] [id number] [phone]    Call   Monday morning to arrange follow-up in the next 1 to 2 weeks, sooner if needed.    Amol Arroyo MD  [address] [facility] [facility] [address]  [facility] [address]  [phone]    Schedule an appointment as soon as possible for a visit in 1 week  for continued or worsened symptoms, Sooner if needed         Medication List      New Prescriptions    ketorolac 10 MG tablet  Commonly known as: TORADOL  Take 1 tablet by mouth Every 6 (Six) Hours As Needed for Moderate Pain  for up to 20 doses.     methocarbamol 750 MG tablet  Commonly known as: ROBAXIN  Take 2 tablets by mouth 3 (Three) Times a Day for 7 days.     oxyCODONE-acetaminophen 5-325 MG per tablet  Commonly known as: PERCOCET  Take 1 tablet by mouth Every 6 (Six) Hours As Needed for Moderate Pain  or Severe Pain  for up to 20 doses. 2 tabs if needed     predniSONE 10 MG tablet  Commonly known as: DELTASONE  6 tabs by mouth day 1, 5 tabs by mouth day 2, continue tapering one tablet daily: Coarse 6 days.  Start taking on: December 25, 2021           Where to Get Your Medications      These medications were sent to [address] - [address] [facility] AT [facility] [employer] [facility] [phone] [facility] [phone]   [address] [place], [place] [address]    Phone: [phone]   · ketorolac 10 MG tablet  · methocarbamol 750 MG tablet  · oxyCODONE-acetaminophen 5-325 MG per tablet  · predniSONE 10 MG tablet          Porter Gordon MD  12/24/21 4599

## 2022-01-10 NOTE — PROGRESS NOTES
Subjective   Patient ID: Nicki Fowler is a 51 y.o. female is being seen for consultation today at the request of Porter Gordon MD for back and left leg pain. Notes from today's visit will be forwarded to her primary care provider, Dr. Elizabeth West. She presented to the ER on 12/24/21 c/o left leg pain x 5 days. She has had problems with the low back in the past in addition to cervical spine issues. She underwent MRI imaging of both the cervical and lumbar spine back in 2018 ended to death having cervical spine surgery with Dr. Kendrick after that. She had taken hydrocodone leftover from previous surgery in 2018 and it did not improve the pain.    History of Present Illness     Ms. Fowler reports the leftleg pain began 4-5 days before she presented to the ER on 12/24/2021. She reports pain in her left buttock and hip and was shooting down posterior lateral thigh and crossing over the knee and down the left shin. She scribes the pain as a constant john horse sensation. It is most bothersome from the knee down and into the left foot. She also has associated numbness and tingling and reports in her left shin and ankle is a constant dull ache. She denies any injury, pull, slip, fall or heavy lifting at onset of pain. The pain is worse when lying down at night.     She was given a 7-day redness on taper as well as muscle relaxers and oxycodone. She reports that the prednisone helped bring the pain level down but she feels as though the pain is beginning to return. She denies any bowel/bladder incontinence. She is here today with a CT of the lumbar spine without contrast was performed in the emergency department December 24, 2021.    The following portions of the patient's history were reviewed and updated as appropriate: allergies, current medications, past family history, past medical history, past social history, past surgical history and problem list.     Body mass index is 21.4 kg/m².     .Patient's Body mass  "index is 21.4 kg/m². indicating that she is within normal range (BMI 18.5-24.9). No BMI management plan needed.       Review of Systems   Constitutional: Positive for activity change.   HENT: Negative for congestion.    Eyes: Negative for visual disturbance.   Respiratory: Negative for chest tightness and shortness of breath.    Cardiovascular: Negative for chest pain.   Gastrointestinal: Negative for nausea and vomiting.   Endocrine: Positive for cold intolerance. Negative for heat intolerance.   Genitourinary: Negative for difficulty urinating.   Musculoskeletal: Positive for back pain.   Skin: Negative for rash and wound.   Allergic/Immunologic: Positive for food allergies.   Neurological: Positive for weakness and numbness.   Hematological: Bruises/bleeds easily.   Psychiatric/Behavioral: Positive for sleep disturbance.       Objective     Vitals:    01/11/22 0933   BP: 124/80   Pulse: 88   Temp: 97.9 °F (36.6 °C)   SpO2: 99%   Weight: 53.1 kg (117 lb)   Height: 157.5 cm (62\")     Body mass index is 21.4 kg/m².      Physical Exam  Vitals reviewed.   Constitutional:       General: She is not in acute distress.     Appearance: Normal appearance. She is well-developed. She is not ill-appearing, toxic-appearing or diaphoretic.   HENT:      Head: Normocephalic and atraumatic.      Mouth/Throat:      Mouth: Mucous membranes are moist.   Eyes:      General:         Right eye: No discharge.         Left eye: No discharge.      Conjunctiva/sclera: Conjunctivae normal.   Neck:      Trachea: No tracheal deviation.   Cardiovascular:      Rate and Rhythm: Normal rate.   Pulmonary:      Effort: Pulmonary effort is normal. No respiratory distress.   Abdominal:      General: There is no distension.      Palpations: Abdomen is soft.      Tenderness: There is no abdominal tenderness.   Musculoskeletal:         General: No tenderness. Normal range of motion.      Cervical back: Normal range of motion and neck supple.      Right " lower leg: No edema.      Left lower leg: No edema.   Skin:     General: Skin is warm and dry.      Findings: No erythema.   Neurological:      Mental Status: She is alert and oriented to person, place, and time.      GCS: GCS eye subscore is 4. GCS verbal subscore is 5. GCS motor subscore is 6.      Sensory: No sensory deficit.      Motor: No abnormal muscle tone.      Coordination: Coordination normal.      Deep Tendon Reflexes: Reflexes are normal and symmetric. Reflexes normal.      Comments: Normal motor and sensory exam except for left tibialis anterior weakness 4/5 and left EHL weakness 3/5. Sensation is decreased in the left shin and top of the foot. DTR's normal. Negative Almaraz's; negative clonus. SLR positive on the left at 30 degrees. Exhibits some difficulty with heel walking on the left.   Psychiatric:         Behavior: Behavior is cooperative.         Thought Content: Thought content normal.         Assessment/Plan   Independent Review of Radiographic Studies:      I personally reviewed the images from the following studies.    CT imaging of the lumbar spine dated December 24, 2021 reviewed today in the office.  There is no evidence of acute fracture.  There is some degenerative disc bulges at multiple levels with at least moderate foraminal stenosis on the left at L3-4 and L4-5.    Previous MRI imaging of the lumbar spine from June 8, 2018 also reviewed.  It does show transitional anatomy L5-S1 sacralization of L5.  Multilevel facet degenerative changes noted with disc material extending to the left far laterally and an associated annular tear at L4-5.    Medical Decision Making:      Ms. Fowler was seen in the office today for the above complaints. She has a history of prior cervical fusion surgery with Dr. Mireille peraza in 2018. She had been having some low back issues at that time for which an MRI of the cervical and lumbar spine was performed. That MRI did show a small far lateral left disc  herniation with no significant nerve root compression. History and exam findings today point toward pathology at L4, L5.     I have ordered an MRI of the lumbar spine without contrast. The patient will be given Valium due to her history of claustrophobia. I have repeated steroid taper and updated the prescription for muscle relaxers. The patient is aware that this may be a surgical problem. She will return to the office after above imaging is completed for evaluation by Dr. Kendrick.     Diagnoses and all orders for this visit:    1. Left lumbar radiculopathy (Primary)  -     MRI Lumbar Spine Without Contrast; Future  -     diazePAM (Valium) 5 MG tablet; Take 1-2 tablets po 45 minutes prior to MRI  Dispense: 2 tablet; Refill: 0    2. Numbness and tingling of left leg  -     MRI Lumbar Spine Without Contrast; Future  -     diazePAM (Valium) 5 MG tablet; Take 1-2 tablets po 45 minutes prior to MRI  Dispense: 2 tablet; Refill: 0    3. Muscle weakness affecting movement of foot  -     MRI Lumbar Spine Without Contrast; Future  -     diazePAM (Valium) 5 MG tablet; Take 1-2 tablets po 45 minutes prior to MRI  Dispense: 2 tablet; Refill: 0    Other orders  -     dexamethasone (DECADRON) 1.5 MG tablet; 3 tab am,3 tab pm x 4d; 3tab am,2 tab pm x 1 d; 2 tab am,2 tab pm x 3d;2 tab am,1 tab pm x 1d;1 tab am,1 tab pm x 3d;1 tab am x 1d then stop  Dispense: 51 tablet; Refill: 0  -     methocarbamol (ROBAXIN) 750 MG tablet; Take 1 tablet by mouth 4 (Four) Times a Day As Needed for Muscle Spasms.  Dispense: 60 tablet; Refill: 0      Return for Dr. Kendrick as soon as MRI completed to discuss possible surgery .

## 2022-01-11 ENCOUNTER — OFFICE VISIT (OUTPATIENT)
Dept: NEUROSURGERY | Facility: CLINIC | Age: 52
End: 2022-01-11

## 2022-01-11 VITALS
DIASTOLIC BLOOD PRESSURE: 80 MMHG | BODY MASS INDEX: 21.53 KG/M2 | HEART RATE: 88 BPM | OXYGEN SATURATION: 99 % | WEIGHT: 117 LBS | SYSTOLIC BLOOD PRESSURE: 124 MMHG | TEMPERATURE: 97.9 F | HEIGHT: 62 IN

## 2022-01-11 DIAGNOSIS — M62.81 MUSCLE WEAKNESS AFFECTING MOVEMENT OF FOOT: ICD-10-CM

## 2022-01-11 DIAGNOSIS — R20.0 NUMBNESS AND TINGLING OF LEFT LEG: ICD-10-CM

## 2022-01-11 DIAGNOSIS — R20.2 NUMBNESS AND TINGLING OF LEFT LEG: ICD-10-CM

## 2022-01-11 DIAGNOSIS — M54.16 LEFT LUMBAR RADICULOPATHY: Primary | ICD-10-CM

## 2022-01-11 PROCEDURE — 99214 OFFICE O/P EST MOD 30 MIN: CPT | Performed by: NURSE PRACTITIONER

## 2022-01-11 RX ORDER — METHOCARBAMOL 750 MG/1
750 TABLET, FILM COATED ORAL 4 TIMES DAILY PRN
Qty: 60 TABLET | Refills: 0 | Status: SHIPPED | OUTPATIENT
Start: 2022-01-11

## 2022-01-11 RX ORDER — DIAZEPAM 5 MG/1
TABLET ORAL
Qty: 2 TABLET | Refills: 0 | Status: SHIPPED | OUTPATIENT
Start: 2022-01-11 | End: 2022-03-28

## 2022-01-11 RX ORDER — DEXAMETHASONE 1.5 MG/1
TABLET ORAL
Qty: 51 TABLET | Refills: 0 | Status: SHIPPED | OUTPATIENT
Start: 2022-01-11 | End: 2022-02-24

## 2022-01-12 ENCOUNTER — HOSPITAL ENCOUNTER (OUTPATIENT)
Dept: MRI IMAGING | Facility: HOSPITAL | Age: 52
Discharge: HOME | End: 2022-01-12
Admitting: NURSE PRACTITIONER

## 2022-01-12 DIAGNOSIS — R20.0 NUMBNESS AND TINGLING OF LEFT LEG: ICD-10-CM

## 2022-01-12 DIAGNOSIS — M62.81 MUSCLE WEAKNESS AFFECTING MOVEMENT OF FOOT: ICD-10-CM

## 2022-01-12 DIAGNOSIS — M54.16 LEFT LUMBAR RADICULOPATHY: ICD-10-CM

## 2022-01-12 DIAGNOSIS — R20.2 NUMBNESS AND TINGLING OF LEFT LEG: ICD-10-CM

## 2022-01-12 PROCEDURE — 72148 MRI LUMBAR SPINE W/O DYE: CPT

## 2022-01-14 ENCOUNTER — PATIENT ROUNDING (BHMG ONLY) (OUTPATIENT)
Dept: NEUROSURGERY | Facility: CLINIC | Age: 52
End: 2022-01-14

## 2022-01-14 NOTE — PROGRESS NOTES
OK thanks Dr. Kendrick.     Steph can  you please make sure that Ms. Fowler is aware of the appt with Dr. Kendrick on 1/18? Thanks.

## 2022-02-03 NOTE — PROGRESS NOTES
"Subjective   Patient ID: Nicki Fowler is a 51 y.o. female is here today for follow-up with a new MRI Lumbar done on 01.12.2022 at [facility].    Today patient states that her symptoms are mild. Patient states that she is having low back pain that radiates to the L leg, along with N/T in the leg.      Patient, Provider, and MA are all wearing  Mask in our office today.     History of Present Illness     This patient continues with pain primarily in her left leg.  Is in her left buttock and left hip.  It shoots down her posterior lateral thigh and goes down into the left shin.  She does not have much trouble with the right leg.  Recently she has been having more trouble with the anterior aspect of the left thigh.    The following portions of the patient's history were reviewed and updated as appropriate: allergies, current medications, past family history, past medical history, past social history, past surgical history and problem list.    Review of Systems   Constitutional: Negative for chills and fever.   HENT: Negative for congestion.    Genitourinary: Negative for difficulty urinating and dysuria.   Musculoskeletal: Positive for back pain, gait problem and myalgias.        Left Leg pain   Neurological: Positive for weakness and numbness.       I have reviewed the review of systems as documented by my MA.      Objective     Vitals:    02/08/22 1537   BP: 119/81   Cuff Size: Adult   Pulse: 91   Temp: 98 °F (36.7 °C)   Weight: 53.1 kg (117 lb)   Height: 157.5 cm (62\")     Body mass index is 21.4 kg/m².      Physical Exam  Neurological:      Mental Status: She is alert and oriented to person, place, and time.       Neurologic Exam     Mental Status   Oriented to person, place, and time.           Assessment/Plan   Independent Review of Radiographic Studies:      I personally reviewed the images from the following studies.    I reviewed an MRI of her lumbar spine done on 12 January of this year.  This does show a " grade 1 spondylolisthesis of L4 on L5.  There is a far lateral disc herniation into the neuroforamen on the left side at L4-5.  There was some far lateral disc bulging at L1-2 as well that may have gotten worse since 2018 but it is still not very large.    Medical Decision Making:      I told the patient and her  about the imaging.  I do think that her symptoms are probably coming from the far lateral disc herniation at L4-5.  I do not think the L1-2 disc is causing much trouble.  I told her we could consider surgery but I would really like to try a selective nerve root injection before we consider surgery.  We will get that set up for her and then see her back after it has been done.    Diagnoses and all orders for this visit:    1. Lumbar radiculopathy (Primary)  -     Selective Nerve Root Injection      Return for Recheck and call after treatment or consultation.

## 2022-02-08 ENCOUNTER — OFFICE VISIT (OUTPATIENT)
Dept: NEUROSURGERY | Facility: CLINIC | Age: 52
End: 2022-02-08

## 2022-02-08 VITALS
HEART RATE: 91 BPM | DIASTOLIC BLOOD PRESSURE: 81 MMHG | BODY MASS INDEX: 21.53 KG/M2 | HEIGHT: 62 IN | SYSTOLIC BLOOD PRESSURE: 119 MMHG | WEIGHT: 117 LBS | TEMPERATURE: 98 F

## 2022-02-08 DIAGNOSIS — M54.16 LUMBAR RADICULOPATHY: Primary | ICD-10-CM

## 2022-02-08 PROCEDURE — 99213 OFFICE O/P EST LOW 20 MIN: CPT | Performed by: NEUROLOGICAL SURGERY

## 2022-02-11 NOTE — PROGRESS NOTES
Physical Therapy Daily Progress Note    Subjective   Pt reports no changes or concerns. She feels prepared for DC today. At times, neck and top of shoulders feel tight.    Objective    R 60lbs, 50lbs  See Exercise, Manual, and Modality Logs for complete treatment.       Assessment/Plan  Pt reports relief of tightness with stretching. Demonstrates independence with HEP. All goals met. Pt is prepared for DC at this time.               Manual Therapy:    0     mins  27319;  Therapeutic Exercise:    30     mins  33750;     Neuromuscular Aminta:    0    mins  03164;    Therapeutic Activity:     0     mins  22385;     Gait Trainin     mins  71030;     Ultrasound:     0     mins  68981;    Work Hardening           0      mins 77069  Iontophoresis               0   mins 60436    Timed Treatment:   30   mins   Total Treatment:     45   mins    Miriam Chavarria, PT  Physical Therapist   pt a&o x4 ambulatory c.o of chest pain x2 days, seen at hospital last night discharged with pantoprazole. Pt states pain worsening. HX open heart surgery.

## 2022-02-16 ENCOUNTER — APPOINTMENT (OUTPATIENT)
Dept: WOMENS IMAGING | Facility: HOSPITAL | Age: 52
End: 2022-02-16

## 2022-02-16 PROCEDURE — 77063 BREAST TOMOSYNTHESIS BI: CPT | Performed by: RADIOLOGY

## 2022-02-16 PROCEDURE — 77067 SCR MAMMO BI INCL CAD: CPT | Performed by: RADIOLOGY

## 2022-02-24 ENCOUNTER — HOSPITAL ENCOUNTER (OUTPATIENT)
Dept: GENERAL RADIOLOGY | Facility: HOSPITAL | Age: 52
Discharge: HOME | End: 2022-02-24

## 2022-02-24 ENCOUNTER — ANESTHESIA (OUTPATIENT)
Dept: PAIN MEDICINE | Facility: HOSPITAL | Age: 52
End: 2022-02-24

## 2022-02-24 ENCOUNTER — ANESTHESIA EVENT (OUTPATIENT)
Dept: PAIN MEDICINE | Facility: HOSPITAL | Age: 52
End: 2022-02-24

## 2022-02-24 ENCOUNTER — HOSPITAL ENCOUNTER (OUTPATIENT)
Dept: PAIN MEDICINE | Facility: HOSPITAL | Age: 52
Discharge: HOME | End: 2022-02-24

## 2022-02-24 VITALS
DIASTOLIC BLOOD PRESSURE: 74 MMHG | SYSTOLIC BLOOD PRESSURE: 113 MMHG | BODY MASS INDEX: 22.63 KG/M2 | RESPIRATION RATE: 16 BRPM | HEIGHT: 62 IN | HEART RATE: 92 BPM | TEMPERATURE: 97.2 F | WEIGHT: 123 LBS | OXYGEN SATURATION: 99 %

## 2022-02-24 DIAGNOSIS — R52 PAIN: ICD-10-CM

## 2022-02-24 DIAGNOSIS — M54.16 LUMBAR RADICULOPATHY: ICD-10-CM

## 2022-02-24 PROCEDURE — 77003 FLUOROGUIDE FOR SPINE INJECT: CPT

## 2022-02-24 PROCEDURE — 25010000002 DEXAMETHASONE PER 1 MG: Performed by: ANESTHESIOLOGY

## 2022-02-24 RX ORDER — FENTANYL CITRATE 50 UG/ML
100 INJECTION, SOLUTION INTRAMUSCULAR; INTRAVENOUS ONCE
Status: DISCONTINUED | OUTPATIENT
Start: 2022-02-24 | End: 2022-02-25 | Stop reason: HOSPADM

## 2022-02-24 RX ORDER — MIDAZOLAM HYDROCHLORIDE 1 MG/ML
2 INJECTION INTRAMUSCULAR; INTRAVENOUS ONCE
Status: DISCONTINUED | OUTPATIENT
Start: 2022-02-24 | End: 2022-02-25 | Stop reason: HOSPADM

## 2022-02-24 RX ORDER — LIDOCAINE HYDROCHLORIDE 10 MG/ML
1 INJECTION, SOLUTION INFILTRATION; PERINEURAL ONCE
Status: DISCONTINUED | OUTPATIENT
Start: 2022-02-24 | End: 2022-02-25 | Stop reason: HOSPADM

## 2022-02-24 RX ORDER — DEXAMETHASONE SODIUM PHOSPHATE 10 MG/ML
10 INJECTION, SOLUTION INTRAMUSCULAR; INTRAVENOUS ONCE
Status: COMPLETED | OUTPATIENT
Start: 2022-02-24 | End: 2022-02-24

## 2022-02-24 RX ADMIN — DEXAMETHASONE SODIUM PHOSPHATE 10 MG: 10 INJECTION, SOLUTION INTRAMUSCULAR; INTRAVENOUS at 12:02

## 2022-02-24 NOTE — H&P
[facility]    History and Physical    Patient Name: Nicki Fowler  :  1970  MRN:  [id number]  Date of Admission: 2022    Subjective     Patient is a 51 y.o. female presents with chief complaint of acute, constant, severe, 5-9/10, aching, sharp, numbness, muscle spasms, unknown etiology low back and leg: left pain.  Onset of symptoms was abrupt starting 2 months ago.  Symptoms are associated/aggravated by activity, exercise, lifting, lying down, running, standing, straining, twisting or walking for more than several minutes. Symptoms improve with nothing    The following portions of the patients history were reviewed and updated as appropriate: current medications, allergies, past medical history, past surgical history, past family history, past social history and problem list                Objective     Past Medical History:   Past Medical History:   Diagnosis Date   • Anxiety and depression    • Arthritis    • Cervical disc disorder    • Closed fracture of coccyx (HCC) 2013   • Colon polyp 2018    Dr. Mares removed during colonoscopy   • Dizziness     constant x 3 weeks   • Headache    • History of seasonal allergies    • Hyperlipidemia    • IBS (irritable bowel syndrome)    • Lactose intolerance early     dairy causes bloating and gas   • Low back pain    • Lumbosacral disc disease    • Meniere's disease    • Peripheral neuropathy      Past Surgical History:   Past Surgical History:   Procedure Laterality Date   • ABDOMINAL SURGERY  10/10/1996       • ANTERIOR CERVICAL DISCECTOMY W/ FUSION N/A 2018    Procedure: C5-6 ANTERIOR CERVICAL DISCECTOMY FUSION WITH INSTRUMENTATION;  Surgeon: Porter Kendrick MD;  Location: [facility];  Service: Neurosurgery   •  SECTION     • COLONOSCOPY N/A 2018    One 6 mm polyp in the ascending colon, NBIH.  PATH: Hyperplastic polyp    • EPIDURAL BLOCK      Series of 4 block for cervical ddd   • LASIK     • NECK SURGERY   "   • WISDOM TOOTH EXTRACTION       Family History:   Family History   Problem Relation Age of Onset   • Cancer Mother         Non-Hodgkins Lymphoma   • Hypertension Mother    • Atrial fibrillation Mother    • Arthritis Mother         DDD   • Heart disease Mother         A-fib   • Hyperlipidemia Mother    • Thyroid disease Mother         Hypothyroid   • Cancer Father         Hodgkins Lymphoma and Non-Hodgkins Lymphoma at two separate times of his life   • Heart attack Father    • Stroke Father    • Hypertension Brother    • Arthritis Brother         DDD   • Hyperlipidemia Brother    • Asthma Daughter    • Arthritis Brother         DDD   • Heart disease Maternal Aunt         Congestive Heart Failure   • Malig Hyperthermia Neg Hx      Social History:   Social History     Socioeconomic History   • Marital status:    • Number of children: 3   • Years of education: BA   Tobacco Use   • Smoking status: Former Smoker     Packs/day: 2.00     Years: 20.00     Pack years: 40.00     Types: Cigarettes     Start date:      Quit date:      Years since quittin.1   • Smokeless tobacco: Never Used   • Tobacco comment: stopped 5 years for pregnancies   Substance and Sexual Activity   • Alcohol use: No     Comment: Social   • Drug use: No   • Sexual activity: Yes     Partners: Male     Birth control/protection: Other       Vital Signs Range for the last 24 hours  Temperature: Temp:  [36.2 °C (97.2 °F)] 36.2 °C (97.2 °F)   Temp Source: Temp src: Oral   BP: BP: (122)/(81) 122/81   Pulse: Heart Rate:  [89] 89   Respirations: Resp:  [16] 16   SPO2: SpO2:  [92 %] 92 %   O2 Amount (l/min):     O2 Devices Device (Oxygen Therapy): room air   Weight: Weight:  [55.8 kg (123 lb)] 55.8 kg (123 lb)     Flowsheet Rows      First Filed Value   Admission Height 157.5 cm (62\") Documented at 2022 1139   Admission Weight 55.8 kg (123 lb) Documented at 2022 1139    "       --------------------------------------------------------------------------------    Current Outpatient Medications   Medication Sig Dispense Refill   • diazePAM (Valium) 5 MG tablet Take 1-2 tablets po 45 minutes prior to MRI 2 tablet 0   • FIBER PO Take  by mouth Every Morning.     • ketorolac (TORADOL) 10 MG tablet Take 1 tablet by mouth Every 6 (Six) Hours As Needed for Moderate Pain  for up to 20 doses. 20 tablet 0   • methocarbamol (ROBAXIN) 750 MG tablet Take 1 tablet by mouth 4 (Four) Times a Day As Needed for Muscle Spasms. 60 tablet 0   • nortriptyline (PAMELOR) 10 MG capsule TAKE ONE CAPSULE BY MOUTH ONCE NIGHTLY 90 capsule 3   • oxyCODONE-acetaminophen (PERCOCET) 5-325 MG per tablet Take 1 tablet by mouth Every 6 (Six) Hours As Needed for Moderate Pain  or Severe Pain  for up to 20 doses. 2 tabs if needed 20 tablet 0   • Progesterone (PROMETRIUM) 200 MG capsule Take 200 mg by mouth Daily. Once daily for the first 10 days of each month       Current Facility-Administered Medications   Medication Dose Route Frequency Provider Last Rate Last Admin   • dexamethasone sodium phosphate injection 10 mg  10 mg Peripheral Nerve Once Reji Campo MD       • fentaNYL citrate (PF) (SUBLIMAZE) injection 100 mcg  100 mcg Intravenous Once Reji Campo MD       • iopamidol (ISOVUE-M 200) injection 41%  2 mL Epidural Once in imaging Reji Campo MD       • lidocaine (XYLOCAINE) 1 % injection 1 mL  1 mL Intradermal Once Reji Campo MD       • midazolam (VERSED) injection 2 mg  2 mg Intravenous Once Reji Campo MD           --------------------------------------------------------------------------------  Assessment/Plan      Anesthesia Evaluation     Patient summary reviewed and Nursing notes reviewed         Pain impairs ability to perform ADLs: Bathing, Dressing, Housekeeping, Ambulation, Exercise/Activity, Sleeping and Meal prep/Eating  Modalities previously tried to control pain with limited  effectiveness within the last 4-6 weeks: Rest, Heat, OTC medications and Prescription medications     Airway   Mallampati: II  Dental - normal exam     Pulmonary - negative pulmonary ROS and normal exam   Cardiovascular - negative cardio ROS and normal exam        Neuro/Psych- negative ROS and neuro exam normal  GI/Hepatic/Renal/Endo - negative ROS     Musculoskeletal (-) negative ROS and normal exam    Abdominal  - normal exam   Substance History - negative use     OB/GYN negative ob/gyn ROS         Other                 Diagnosis and Plan    Treatment Plan  ASA 2      Procedures: Selective Nerve Root Injection (selective nerve root block, Lumbar 4-5, left), With fluoroscopy,               Diagnosis     * Displacement of lumbar intervertebral disc with radiculopathy [M51.16]            CHIEF COMPLAINT:       HISTORY OF PRESENT ILLNESS:      PAST MEDICAL HISTORY:  Current Outpatient Medications on File Prior to Encounter   Medication Sig Dispense Refill   • diazePAM (Valium) 5 MG tablet Take 1-2 tablets po 45 minutes prior to MRI 2 tablet 0   • FIBER PO Take  by mouth Every Morning.     • ketorolac (TORADOL) 10 MG tablet Take 1 tablet by mouth Every 6 (Six) Hours As Needed for Moderate Pain  for up to 20 doses. 20 tablet 0   • methocarbamol (ROBAXIN) 750 MG tablet Take 1 tablet by mouth 4 (Four) Times a Day As Needed for Muscle Spasms. 60 tablet 0   • nortriptyline (PAMELOR) 10 MG capsule TAKE ONE CAPSULE BY MOUTH ONCE NIGHTLY 90 capsule 3   • oxyCODONE-acetaminophen (PERCOCET) 5-325 MG per tablet Take 1 tablet by mouth Every 6 (Six) Hours As Needed for Moderate Pain  or Severe Pain  for up to 20 doses. 2 tabs if needed 20 tablet 0   • Progesterone (PROMETRIUM) 200 MG capsule Take 200 mg by mouth Daily. Once daily for the first 10 days of each month     • [DISCONTINUED] dexamethasone (DECADRON) 1.5 MG tablet 3 tab am,3 tab pm x 4d; 3tab am,2 tab pm x 1 d; 2 tab am,2 tab pm x 3d;2 tab am,1 tab pm x 1d;1 tab am,1 tab  "pm x 3d;1 tab am x 1d then stop 51 tablet 0     No current facility-administered medications on file prior to encounter.       Past Medical History:   Diagnosis Date   • Anxiety and depression    • Arthritis    • Cervical disc disorder    • Closed fracture of coccyx (HCC) 09/2013   • Colon polyp 4/30/2018    Dr. Mares removed during colonoscopy   • Dizziness     constant x 3 weeks   • Headache    • History of seasonal allergies    • Hyperlipidemia    • IBS (irritable bowel syndrome)    • Lactose intolerance early 20's    dairy causes bloating and gas   • Low back pain    • Lumbosacral disc disease    • Meniere's disease    • Peripheral neuropathy          SOCIAL HISTORY:  No tobacco    REVIEW OF SYSTEMS:  No hematologic infectious or constitutional symptoms  Other review of systems non-contributory    PHYSICAL EXAM:  /81 (BP Location: Left arm, Patient Position: Sitting)   Pulse 89   Temp 36.2 °C (97.2 °F) (Oral)   Resp 16   Ht 157.5 cm (62\")   Wt 55.8 kg (123 lb)   LMP 02/22/2022   SpO2 92%   BMI 22.50 kg/m²   Well-developed well-nourished no acute distress  Extra ocular movements intact  Mallampati class 2 airway  Cardiac:  Regular rate and rhythm  Lungs:  Clear to auscultation bilaterally with good effort  Alert and oriented ×3  Deep tendon reflexes normal in the bilateral patella  Negative straight leg raise bilaterally  5 out of 5 strength bilateral upper and lower extremities  Lumbar spine without obvious deformities ecchymoses  Lumbar spine nontender to palpation      DIAGNOSIS:  Post-Op Diagnosis Codes:     * Displacement of lumbar intervertebral disc with radiculopathy [M51.16]    PLAN:  1. selective nerve root block, Lumbar 4-5, left , up to 4, spaced 2-3 weeks apart.  If pain control is acceptable after 1 or 2 injections, it was discussed with the patient that they may return for the subsequent injections if and when their pain returns.  The risks were discussed with the patient including " failure of relief, worsening pain, Headache (post dural puncture headache), bleeding (epidural hematoma) and infection (epidural abscess or skin infection).  2.  Physical therapy exercises at home as prescribed by physical therapy or from the pain clinic handout (given to the patient).  Continuation of these exercises every day, or multiple times per week, even when the patient has good pain relief, was stressed to the patient as a preventative measure to decrease the frequency and severity of future pain episodes.  3.  Continue pain medicines as already prescribed.  If patient not currently taking any, it is recommended to begin Acetaminophen 1000 mg po q 8 hours.  If other medicines containing Acetaminophen are currently prescribed, maintain daily dose at 3000 mg.    4.  If they can tolerate NSAIDS, it is recommended to take Ibuprofen 600 mg po q 6 hours for 7 days during pain exacerbations.  Alternatively, they may substitute an NSAID of their choice (e.g. Aleve).  This may be taken at the same time as Acetaminophen.  5.  Heat and ice to the affected area as tolerated for pain control.  It was discussed that heating pads can cause burns.  6.  Daily low impact exercise such as walking or water exercise was recommended to maintain overall health and aid in weight control.   7.  Follow up as needed for subsequent injections.  8.  Patient was counseled to abstain from tobacco products.    Target : selective nerve root block, Lumbar 4-5, left  Time :   23   min

## 2022-02-24 NOTE — ANESTHESIA PROCEDURE NOTES
Selective nerve root block, Lumbar 4-5, left    Pre-sedation assessment completed: 2/24/2022 11:50 AM    Patient reassessed immediately prior to procedure    Patient location during procedure: pain clinic  Start Time: 2/24/2022 11:50 AM  Stop Time: 2/24/2022 12:04 PM  Indication:at surgeon's request and procedure for pain  Performed By  Anesthesiologist: Reji Campo MD  Preanesthetic Checklist  Completed: patient identified, IV checked, site marked, risks and benefits discussed, surgical consent, monitors and equipment checked, pre-op evaluation and timeout performed  Additional Notes  Dx : Post-Op Diagnosis Codes:     * Displacement of lumbar intervertebral disc with radiculopathy (M51.16)  Prep:  Pt Position:prone  Sterile Tech:cap, gloves, mask and sterile barrier  Prep:chlorhexidine gluconate and isopropyl alcohol  Monitoring:blood pressure monitoring, continuous pulse oximetry and EKG  Procedure:Sedation: no     Guidance: fluoroscopy and pa and lat  Location:lumbar  Level:4-5 (left)  Needle Type:Other (blunt tip epimed needle 22 G)  Needle Gauge:22 G  Aspiration:negative  Test Dose:negative  Medications:  Isovue:0mL  Comments:Dexamethasone 10 mg  Lidocaine 1% - 1cc    No dye due to allergy  Post Assessment:  Post-procedure: bandaid.  Pt Tolerance:patient tolerated the procedure well with no apparent complications  Complications:no        We were not able to use dye due to an allergy but she did have a good and appropriate paresthesia upon needle placement and injection of the medicine.

## 2022-03-21 ENCOUNTER — TELEPHONE (OUTPATIENT)
Dept: PAIN MEDICINE | Facility: HOSPITAL | Age: 52
End: 2022-03-21

## 2022-03-21 NOTE — TELEPHONE ENCOUNTER
..Following your procedure would you be comfortable saying that at best you are 50% improved or more?  Yes or No  yes    What would you rate your pain on a 1-10 scale?   Prior to your last procedure:  9  For the first 2-3 weeks following your last procedure:2    Currently:2 WHEN PAIN IS AT BEST    Has your function improved?  Tell us how.    (Can sit/stand longer or walk further?  Complete daily activities more easily?  Have been able to go back to work?) ALL DAILY FUNCTIONS IMPROVED, LITTLE PAIN, BUT STILL HAS TINGLING AND NUMBNESS    Have you tried/continued to try any other treatments? (Chiropractor, PT, massage, yoga, back exercises, heat/ice)NO    What medications are you currently taking to help with your pain and how often?  IN CHART    Is there anything else you'd like to tell us that has improved since your last procedure?

## 2022-03-28 ENCOUNTER — HOSPITAL ENCOUNTER (OUTPATIENT)
Dept: GENERAL RADIOLOGY | Facility: HOSPITAL | Age: 52
Discharge: HOME | End: 2022-03-28

## 2022-03-28 ENCOUNTER — HOSPITAL ENCOUNTER (OUTPATIENT)
Dept: PAIN MEDICINE | Facility: HOSPITAL | Age: 52
Discharge: HOME | End: 2022-03-28

## 2022-03-28 ENCOUNTER — ANESTHESIA (OUTPATIENT)
Dept: PAIN MEDICINE | Facility: HOSPITAL | Age: 52
End: 2022-03-28

## 2022-03-28 ENCOUNTER — ANESTHESIA EVENT (OUTPATIENT)
Dept: PAIN MEDICINE | Facility: HOSPITAL | Age: 52
End: 2022-03-28

## 2022-03-28 VITALS
HEART RATE: 84 BPM | TEMPERATURE: 98.6 F | RESPIRATION RATE: 14 BRPM | OXYGEN SATURATION: 97 % | SYSTOLIC BLOOD PRESSURE: 115 MMHG | DIASTOLIC BLOOD PRESSURE: 75 MMHG

## 2022-03-28 DIAGNOSIS — R52 PAIN: ICD-10-CM

## 2022-03-28 DIAGNOSIS — M54.16 LUMBAR RADICULOPATHY: ICD-10-CM

## 2022-03-28 PROCEDURE — 25010000002 METHYLPREDNISOLONE PER 80 MG: Performed by: ANESTHESIOLOGY

## 2022-03-28 PROCEDURE — 77003 FLUOROGUIDE FOR SPINE INJECT: CPT

## 2022-03-28 RX ORDER — MIDAZOLAM HYDROCHLORIDE 1 MG/ML
1 INJECTION INTRAMUSCULAR; INTRAVENOUS AS NEEDED
Status: DISCONTINUED | OUTPATIENT
Start: 2022-03-28 | End: 2022-03-29 | Stop reason: HOSPADM

## 2022-03-28 RX ORDER — METHYLPREDNISOLONE ACETATE 80 MG/ML
80 INJECTION, SUSPENSION INTRA-ARTICULAR; INTRALESIONAL; INTRAMUSCULAR; SOFT TISSUE ONCE
Status: COMPLETED | OUTPATIENT
Start: 2022-03-28 | End: 2022-03-28

## 2022-03-28 RX ORDER — BUPIVACAINE HYDROCHLORIDE AND EPINEPHRINE 2.5; 5 MG/ML; UG/ML
10 INJECTION, SOLUTION EPIDURAL; INFILTRATION; INTRACAUDAL; PERINEURAL ONCE
Status: COMPLETED | OUTPATIENT
Start: 2022-03-28 | End: 2022-03-28

## 2022-03-28 RX ORDER — LIDOCAINE HYDROCHLORIDE 10 MG/ML
1 INJECTION, SOLUTION INFILTRATION; PERINEURAL ONCE AS NEEDED
Status: DISCONTINUED | OUTPATIENT
Start: 2022-03-28 | End: 2022-03-29 | Stop reason: HOSPADM

## 2022-03-28 RX ORDER — SODIUM CHLORIDE 0.9 % (FLUSH) 0.9 %
1-10 SYRINGE (ML) INJECTION AS NEEDED
Status: DISCONTINUED | OUTPATIENT
Start: 2022-03-28 | End: 2022-03-29 | Stop reason: HOSPADM

## 2022-03-28 RX ORDER — FENTANYL CITRATE 50 UG/ML
50 INJECTION, SOLUTION INTRAMUSCULAR; INTRAVENOUS AS NEEDED
Status: DISCONTINUED | OUTPATIENT
Start: 2022-03-28 | End: 2022-03-29 | Stop reason: HOSPADM

## 2022-03-28 RX ADMIN — METHYLPREDNISOLONE ACETATE 80 MG: 80 INJECTION, SUSPENSION INTRA-ARTICULAR; INTRALESIONAL; INTRAMUSCULAR; SOFT TISSUE at 09:57

## 2022-03-28 RX ADMIN — BUPIVACAINE HYDROCHLORIDE AND EPINEPHRINE BITARTRATE 10 ML: 2.5; .005 INJECTION, SOLUTION EPIDURAL; INFILTRATION; INTRACAUDAL; PERINEURAL at 09:57

## 2022-03-28 NOTE — H&P
INTERVAL HISTORY:    The patient returns for another transforaminal steroid injection today.  They have received 90% improvement since their last injection with a pain level of 2-3/10 at its worst recently.    Conservative measures tried in the interim     Current Outpatient Medications on File Prior to Encounter   Medication Sig Dispense Refill   • FIBER PO Take  by mouth Every Morning.     • methocarbamol (ROBAXIN) 750 MG tablet Take 1 tablet by mouth 4 (Four) Times a Day As Needed for Muscle Spasms. 60 tablet 0   • nortriptyline (PAMELOR) 10 MG capsule TAKE ONE CAPSULE BY MOUTH ONCE NIGHTLY 90 capsule 3   • Progesterone (PROMETRIUM) 200 MG capsule Take 200 mg by mouth Daily. Once daily for the first 10 days of each month     • [DISCONTINUED] diazePAM (Valium) 5 MG tablet Take 1-2 tablets po 45 minutes prior to MRI 2 tablet 0   • [DISCONTINUED] ketorolac (TORADOL) 10 MG tablet Take 1 tablet by mouth Every 6 (Six) Hours As Needed for Moderate Pain  for up to 20 doses. 20 tablet 0   • [DISCONTINUED] oxyCODONE-acetaminophen (PERCOCET) 5-325 MG per tablet Take 1 tablet by mouth Every 6 (Six) Hours As Needed for Moderate Pain  or Severe Pain  for up to 20 doses. 2 tabs if needed 20 tablet 0     No current facility-administered medications on file prior to encounter.       Past Medical History:   Diagnosis Date   • Anxiety and depression    • Arthritis    • Cervical disc disorder    • Closed fracture of coccyx (HCC) 09/2013   • Colon polyp 4/30/2018    Dr. Mares removed during colonoscopy   • Dizziness     constant x 3 weeks   • Headache    • History of seasonal allergies    • Hyperlipidemia    • IBS (irritable bowel syndrome)    • Lactose intolerance early 20's    dairy causes bloating and gas   • Low back pain    • Lumbosacral disc disease    • Meniere's disease    • Peripheral neuropathy        No hematologic infectious or constitutional symptoms  Negative screen for REJI      Exam:  /69 (BP Location: Left arm,  Patient Position: Sitting)   Pulse 91   Temp 37 °C (98.6 °F) (Infrared)   Resp 16   LMP 02/21/2022   SpO2 98%   Airway Mallampatti 2  Alert and oriented      Diagnosis:  Post-Op Diagnosis Codes:     * Lumbar radiculopathy [M54.16]    Plan:  Lumbar transforaminal epidural steroid injection (selective nerve root injection) under fluoroscopic guidance    I have encouraged them to continue:  1.  Physical therapy exercises at home as prescribed by physical therapy or from the pain clinic handout (given to the patient).  Continuation of these exercises every day, or multiple times per week, even when the patient has good pain relief, was stressed to the patient as a preventative measure to decrease the frequency and severity of future pain episodes.  2.  Continue pain medicines as already prescribed.  If patient not currently taking any, it is recommended to begin Acetaminophen 1000 mg po q 8 hours.  If other medicines containing Acetaminophen are currently prescribed, maintain daily dose at 3000mg.    3.  If they can tolerate NSAIDS, it is recommended to take Ibuprofen 600 mg po q 6 hours for 7 days during pain exacerbations.   Alternatively, they may substitute an NSAID of their choice (e.g. Aleve)  4.  Heat and ice to the affected area as tolerated for pain control.  It was discussed that heating pads can cause burns.  5.  Low impact exercise such as walking or water exercise was recommended to maintain overall health and aid in weight control.   6.  Follow up as needed for subsequent injections.  7.  Patient was counseled to abstain from tobacco products.

## 2022-03-28 NOTE — ANESTHESIA PROCEDURE NOTES
Selective nerve root block, Lumbar 4-5, left      Patient reassessed immediately prior to procedure    Patient location during procedure: pain clinic  Start Time: 3/28/2022 9:52 AM  Stop Time: 3/28/2022 10:00 AM  Indication:procedure for pain  Performed By  Anesthesiologist: Phillip Angel MD  Preanesthetic Checklist  Completed: patient identified, IV checked, risks and benefits discussed, surgical consent, monitors and equipment checked, pre-op evaluation and timeout performed  Additional Notes  Diagnosis:  Post-Op Diagnosis Codes:     * Lumbar radiculopathy (M54.16)      Prep:  Pt Position:prone  Sterile Tech:gloves, mask and sterile barrier  Prep:chlorhexidine gluconate and isopropyl alcohol  Monitoring:blood pressure monitoring, continuous pulse oximetry and EKG  Procedure:Sedation: no     Approach:left paramedian  Guidance: fluoroscopy  Location:lumbar  Level:4-5  Needle Type:Tuohy  Needle Gauge:20  Aspiration:negative  Test Dose:negative  Medications:  Preservative Free Saline:3mL  Depomedrol:80 mg  Post Assessment:  Dressing:occlusive dressing applied  Pt Tolerance:patient tolerated the procedure well with no apparent complications  Complications:no

## 2022-03-28 NOTE — DISCHARGE INSTRUCTIONS
What can I expect after the procedure?  Follow these instructions at home:  Injection site care  You may remove the bandage (dressing) after 24 hours.  Check your injection site every day for signs of infection. Check for:  Redness, swelling, or pain.  Fluid or blood.  Warmth.  Pus or a bad smell.  Managing pain, stiffness, and swelling  For 24 hours after the procedure:  Avoid using heat on the injection site.  Do not take baths, swim, or use a hot tub. You may take a shower.   If directed, put ice on the injection site. To do this:        Put ice in a plastic bag.  Place a towel between your skin and the bag.  Leave the ice on for 20 minutes, 2-3 times a day.     Activity  NO DRIVING FOR THE REST OF THE DAY IF receiving a sedative during your procedure.  Return to your normal activities the next day as tolerated.  General instructions  The injection site may feel sore.  Take over-the-counter medications as directed on packaging and prescription medicines only as told by your health care provider who prescribes these.  Keep all follow-up visits as told by your health care provider.   Contact a health care provider if:  You have any of these signs of infection:  Redness, swelling, or pain around your injection site.  Fluid or blood coming from your injection site.  Warmth coming from your injection site.  Pus or a bad smell coming from your injection site.  A fever.  You continue to have pain and soreness around the injection site, even after taking over-the-counter pain medicine.  You have severe, sudden, or lasting nausea or vomiting.  Get help right away if:  You have severe pain at the injection site that is not relieved by medicines.  You develop a severe headache or a stiff neck.  You become sensitive to light.  You have any new numbness or weakness in your legs or arms.  You lose control of your bladder or bowel movements.  You have trouble breathing.  Summary  An epidural steroid block is an injection of  steroid medication and numbing medicine that is given into the epidural space.  This injection helps relieve pain caused by an irritated or swollen nerve root. Lumbar Epidural Steroid Injection Instructions  Plan includes:  1.  Lumbar epidural steroid injections, up to 3, spaced 4 weeks apart.  If pain control is acceptable after 1 or 2 injections, it was discussed with the patient that they may return for the subsequent injections if and when their pain returns.  The risks were discussed with the patient including failure of relief, worsening pain, Headache (post dural puncture headache), bleeding (epidural hematoma) and infection (epidural abscess or skin infection).  2.  Physical therapy exercises at home as prescribed by physical therapy or from the pain clinic handout (given to the patient).  Continuation of these exercises every day, or multiple times per week, even when the patient has good pain relief, was stressed to the patient as a preventative measure to decrease the frequency and severity of future pain episodes.  3.  Continue pain medicines as already prescribed.  If patient not currently taking any, it is recommended to begin Acetaminophen 1000 mg po q 8 hours.  If other medicines containing Acetaminophen are currently prescribed, maintain daily dose at 3000 mg.    4.  If they can tolerate NSAIDS, it is recommended to take Ibuprofen 600 mg po q 6 hours for 7 days during pain exacerbations.  Alternatively, they may substitute an NSAID of their choice (e.g. Aleve).  This may be taken at the same time as Acetaminophen.  5.  Heat and ice to the affected area as tolerated for pain control.  It was discussed that heating pads can cause burns.  6.  Daily low impact exercise such as walking or water exercise was recommended to maintain overall health and aid in weight control.   7.  Follow up as needed for subsequent injections.  8.  Patient was counseled to abstain from tobacco products.

## 2022-11-15 RX ORDER — NORTRIPTYLINE HYDROCHLORIDE 10 MG/1
10 CAPSULE ORAL NIGHTLY
Qty: 90 CAPSULE | Refills: 3 | OUTPATIENT
Start: 2022-11-15

## 2022-12-08 ENCOUNTER — OFFICE VISIT (OUTPATIENT)
Dept: GASTROENTEROLOGY | Facility: CLINIC | Age: 52
End: 2022-12-08

## 2022-12-08 VITALS — TEMPERATURE: 97.3 F | BODY MASS INDEX: 23.77 KG/M2 | WEIGHT: 129.2 LBS | HEIGHT: 62 IN

## 2022-12-08 DIAGNOSIS — K58.0 IRRITABLE BOWEL SYNDROME WITH DIARRHEA: Primary | ICD-10-CM

## 2022-12-08 DIAGNOSIS — Z86.010 PERSONAL HISTORY OF COLONIC POLYPS: ICD-10-CM

## 2022-12-08 PROCEDURE — 99214 OFFICE O/P EST MOD 30 MIN: CPT | Performed by: NURSE PRACTITIONER

## 2022-12-08 RX ORDER — NORTRIPTYLINE HYDROCHLORIDE 10 MG/1
20 CAPSULE ORAL NIGHTLY
Qty: 180 CAPSULE | Refills: 3 | Status: SHIPPED | OUTPATIENT
Start: 2022-12-08

## 2022-12-08 NOTE — PROGRESS NOTES
Chief Complaint   Patient presents with   • IBS with Diarrhea       HPI    Nicki Fowler is a  52 y.o. female here for a follow up visit for irritable bowel syndrome with personal history of colon polyps.    This patient follows Dr. Mares and myself.    Patient reports of last several months she finds to nightly Pamelor slightly less efficacious having more episodes of gas, bloat, and fecal urgency.  Symptoms still triggered by gluten and dairy.  Denies abdominal pain, rectal pain, rectal bleeding.    No upper GI complaints.    She has a personal history of colon polyps and is due for colonoscopy next spring.    Past Medical History:   Diagnosis Date   • Anxiety and depression    • Arthritis    • Cervical disc disorder    • Closed fracture of coccyx (HCC) 2013   • Colon polyp 2018    Dr. Mares removed during colonoscopy   • Dizziness     constant x 3 weeks   • Headache    • History of seasonal allergies    • Hyperlipidemia    • IBS (irritable bowel syndrome)    • Lactose intolerance early     dairy causes bloating and gas   • Low back pain    • Lumbosacral disc disease    • Meniere's disease    • Peripheral neuropathy        Past Surgical History:   Procedure Laterality Date   • ABDOMINAL SURGERY  10/10/1996       • ANTERIOR CERVICAL DISCECTOMY W/ FUSION N/A 2018    Procedure: C5-6 ANTERIOR CERVICAL DISCECTOMY FUSION WITH INSTRUMENTATION;  Surgeon: Porter Kendrick MD;  Location: [facility];  Service: Neurosurgery   •  SECTION     • COLONOSCOPY N/A 2018    One 6 mm polyp in the ascending colon, NBIH.  PATH: Hyperplastic polyp    • COLONOSCOPY  2018   • EPIDURAL BLOCK      Series of 4 block for cervical ddd   • LASIK     • NECK SURGERY     • WISDOM TOOTH EXTRACTION         Scheduled Meds:     Continuous Infusions: No current facility-administered medications for this visit.      PRN Meds:     Allergies   Allergen Reactions   • Cinnamon Other (See Comments) and  Shortness Of Breath   • Shellfish-Derived Products Hives       Social History     Socioeconomic History   • Marital status:    • Number of children: 3   • Years of education: BA   Tobacco Use   • Smoking status: Former     Packs/day: 2.00     Years: 20.00     Pack years: 40.00     Types: Cigarettes     Start date: 1983     Quit date: 2008     Years since quittin.9   • Smokeless tobacco: Never   • Tobacco comments:     stopped 5 years for pregnancies   Substance and Sexual Activity   • Alcohol use: No     Comment: Social   • Drug use: No   • Sexual activity: Yes     Partners: Male     Birth control/protection: Other       Family History   Problem Relation Age of Onset   • Cancer Mother         Non-Hodgkins Lymphoma   • Hypertension Mother    • Atrial fibrillation Mother    • Arthritis Mother         DDD   • Heart disease Mother         A-fib   • Hyperlipidemia Mother    • Thyroid disease Mother         Hypothyroid   • Cancer Father         Hodgkins Lymphoma and Non-Hodgkins Lymphoma at two separate times of his life   • Heart attack Father    • Stroke Father    • Hypertension Brother    • Arthritis Brother         DDD   • Hyperlipidemia Brother    • Asthma Daughter    • Arthritis Brother         DDD   • Heart disease Maternal Aunt         Congestive Heart Failure   • Malig Hyperthermia Neg Hx        Review of Systems   Constitutional: Negative for activity change, appetite change, fatigue, fever and unexpected weight change.   HENT: Negative for trouble swallowing.    Respiratory: Negative for apnea, cough, choking, chest tightness, shortness of breath and wheezing.    Cardiovascular: Negative for chest pain, palpitations and leg swelling.   Gastrointestinal: Negative for abdominal distention, abdominal pain, anal bleeding, blood in stool, constipation, diarrhea, nausea, rectal pain and vomiting.        + gas, bloat, and fecal urgency.        Vitals:    22 1255   Temp: 97.3 °F (36.3 °C)        Physical Exam  Constitutional:       Appearance: She is well-developed.   Abdominal:      General: Bowel sounds are normal. There is no distension.      Palpations: Abdomen is soft. There is no mass.      Tenderness: There is no abdominal tenderness. There is no guarding.      Hernia: No hernia is present.   Skin:     General: Skin is warm and dry.      Capillary Refill: Capillary refill takes less than 2 seconds.   Neurological:      Mental Status: She is alert and oriented to person, place, and time.   Psychiatric:         Behavior: Behavior normal.     Assessment    Diagnoses and all orders for this visit:    1. Irritable bowel syndrome with diarrhea (Primary)    2. Personal history of colonic polyps    Other orders  -     nortriptyline (Pamelor) 10 MG capsule; Take 2 capsules by mouth Every Night.  Dispense: 180 capsule; Refill: 3       Plan    Patient reporting some increase in symptoms of IBS over the last several months would like to try increased dosage of Pamelor.  As such prescription for 20 mg nightly provided to the patient.  Continue to avoid known dietary triggers.  Monitor for symptom improvement over the next several months.  Colonoscopy for screening purposes next spring.  RTC 1 year or sooner if needed.         JAYDA Marinelli  [facility]  [address] [place] [address]  Office: [phone]

## 2023-02-10 ENCOUNTER — OFFICE VISIT (OUTPATIENT)
Dept: FAMILY MEDICINE CLINIC | Facility: CLINIC | Age: 53
End: 2023-02-10
Payer: COMMERCIAL

## 2023-02-10 VITALS
HEIGHT: 62 IN | OXYGEN SATURATION: 97 % | HEART RATE: 83 BPM | BODY MASS INDEX: 23.37 KG/M2 | DIASTOLIC BLOOD PRESSURE: 80 MMHG | SYSTOLIC BLOOD PRESSURE: 119 MMHG | WEIGHT: 127 LBS

## 2023-02-10 DIAGNOSIS — F41.9 ANXIETY DISORDER, UNSPECIFIED TYPE: ICD-10-CM

## 2023-02-10 DIAGNOSIS — R53.83 FATIGUE, UNSPECIFIED TYPE: ICD-10-CM

## 2023-02-10 DIAGNOSIS — Z11.59 ENCOUNTER FOR HEPATITIS C SCREENING TEST FOR LOW RISK PATIENT: ICD-10-CM

## 2023-02-10 DIAGNOSIS — K58.0 IRRITABLE BOWEL SYNDROME WITH DIARRHEA: ICD-10-CM

## 2023-02-10 DIAGNOSIS — Z00.00 PHYSICAL EXAM, ROUTINE: Primary | ICD-10-CM

## 2023-02-10 PROCEDURE — 99396 PREV VISIT EST AGE 40-64: CPT | Performed by: FAMILY MEDICINE

## 2023-02-10 NOTE — PROGRESS NOTES
Chief Complaint  Annual Exam    Subjective    History of Present Illness {CC  Problem List  Visit  Diagnosis   Encounters  Notes  Medications  Labs  Result Review Imaging  Media :23}     Nicki Fowler presents to [facility] for Annual Exam.  She lives at home with her . 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 She exercises 4-5 times weekly. She is up to date on her dental and eye visits. She is a former smoker but  Quit more than 15 years ago. She sees [facility] for her pap smear and mammogram. Her last was 1 year ago and she is scheduled later this month. Her last colonoscopy was 2018. She sees Dr. Mares and plans to schedule later this month. He also treats her for IBS with diarrhea. She is currently on pamelor for this.    Their chronic medical conditions were reviewed and are stable unless noted otherwise below.    History of Present Illness     Past Medical History:   Diagnosis Date   • Anxiety and depression    • Arthritis    • Cervical disc disorder    • Closed fracture of coccyx (HCC) 2013   • Colon polyp 2018    Dr. Mares removed during colonoscopy   • Dizziness     constant x 3 weeks   • Headache    • History of seasonal allergies    • Hyperlipidemia    • IBS (irritable bowel syndrome)    • Lactose intolerance early     dairy causes bloating and gas   • Low back pain    • Lumbosacral disc disease    • Meniere's disease    • Peripheral neuropathy         Past Surgical History:   Procedure Laterality Date   • ABDOMINAL SURGERY  10/10/1996       • ANTERIOR CERVICAL DISCECTOMY W/ FUSION N/A 2018    Procedure: C5-6 ANTERIOR CERVICAL DISCECTOMY FUSION WITH INSTRUMENTATION;  Surgeon: Porter Kendrick MD;  Location: [facility];  Service: Neurosurgery   •  SECTION     • COLONOSCOPY N/A 2018    One 6 mm polyp in the ascending colon, NBIH.  PATH: Hyperplastic polyp    • COLONOSCOPY   4/30/2018   • EPIDURAL BLOCK  2007    Series of 4 block for cervical ddd   • LASIK     • NECK SURGERY     • WISDOM TOOTH EXTRACTION            Current Outpatient Medications:   •  FIBER PO, Take  by mouth Every Morning., Disp: , Rfl:   •  nortriptyline (Pamelor) 10 MG capsule, Take 2 capsules by mouth Every Night., Disp: 180 capsule, Rfl: 3  •  methocarbamol (ROBAXIN) 750 MG tablet, Take 1 tablet by mouth 4 (Four) Times a Day As Needed for Muscle Spasms., Disp: 60 tablet, Rfl: 0     Allergies   Allergen Reactions   • Cinnamon Other (See Comments) and Shortness Of Breath   • Shellfish-Derived Products Hives        Family History   Problem Relation Age of Onset   • Cancer Mother         Non-Hodgkins Lymphoma   • Hypertension Mother    • Atrial fibrillation Mother    • Arthritis Mother         DDD   • Heart disease Mother         A-fib   • Hyperlipidemia Mother    • Thyroid disease Mother         Hypothyroid   • Cancer Father         Hodgkins Lymphoma and Non-Hodgkins Lymphoma at two separate times of his life   • Heart attack Father    • Stroke Father    • Hypertension Brother    • Arthritis Brother         DDD   • Hyperlipidemia Brother    • Asthma Daughter    • Arthritis Brother         DDD   • Heart disease Maternal Aunt         Congestive Heart Failure   • Malig Hyperthermia Neg Hx         Social History     Socioeconomic History   • Marital status:    • Number of children: 3   • Years of education: BA   Tobacco Use   • Smoking status: Former     Packs/day: 2.00     Years: 20.00     Pack years: 40.00     Types: Cigarettes     Start date: 1/1/1983     Quit date: 1/1/2008     Years since quitting: 15.1   • Smokeless tobacco: Never   • Tobacco comments:     stopped 5 years for pregnancies   Vaping Use   • Vaping Use: Never used   Substance and Sexual Activity   • Alcohol use: Yes     Alcohol/week: 2.0 standard drinks     Types: 2 Glasses of wine per week     Comment: Social   • Drug use: No   • Sexual  "activity: Yes     Partners: Male     Birth control/protection: Other        Health Maintenance   Topic Date Due   • MAMMOGRAM  Never done   • PAP SMEAR  Never done   • ZOSTER VACCINE (1 of 2) Never done   • LIPID PANEL  Never done   • INFLUENZA VACCINE  Never done   • TDAP/TD VACCINES (2 - Td or Tdap) 02/08/2025        Review of Systems   Constitutional: Positive for fatigue (attributes to not sleeping well with anxiety). Negative for unexpected weight change.   HENT: Negative for ear pain and rhinorrhea.    Eyes: Negative for pain and visual disturbance.   Respiratory: Negative for cough and shortness of breath.    Cardiovascular: Negative for chest pain.   Gastrointestinal: Positive for diarrhea (chronic, with IBS). Negative for abdominal pain and constipation.   Genitourinary: Negative for dysuria and menstrual problem.   Musculoskeletal: Negative for arthralgias and myalgias.   Skin: Negative for rash.   Neurological: Negative for dizziness and weakness.   Psychiatric/Behavioral: Positive for dysphoric mood (anxiety on pamelor). Negative for self-injury and suicidal ideas. The patient is nervous/anxious.         Objective       Vital Signs:   /80   Pulse 83   Ht 157.5 cm (62.01\")   Wt 57.6 kg (127 lb)   SpO2 97%   BMI 23.22 kg/m²     Body mass index is 23.22 kg/m².     PHQ-9 Depression Screening  Little interest or pleasure in doing things? 2-->more than half the days   Feeling down, depressed, or hopeless? 2-->more than half the days   Trouble falling or staying asleep, or sleeping too much?     Feeling tired or having little energy? 3-->nearly every day   Poor appetite or overeating? 3-->nearly every day   Feeling bad about yourself - or that you are a failure or have let yourself or your family down? 0-->not at all   Trouble concentrating on things, such as reading the newspaper or watching television? 3-->nearly every day   Moving or speaking so slowly that other people could have noticed? Or the " opposite - being so fidgety or restless that you have been moving around a lot more than usual? 0-->not at all   Thoughts that you would be better off dead, or of hurting yourself in some way? 0-->not at all   PHQ-9 Total Score 13   If you checked off any problems, how difficult have these problems made it for you to do your work, take care of things at home, or get along with other people? somewhat difficult         Physical Exam  Constitutional:       General: She is not in acute distress.  HENT:      Head: Atraumatic.      Right Ear: Tympanic membrane and external ear normal.      Left Ear: Tympanic membrane and external ear normal.      Nose: No congestion or rhinorrhea.      Mouth/Throat:      Pharynx: No oropharyngeal exudate or posterior oropharyngeal erythema.   Eyes:      Extraocular Movements: Extraocular movements intact.      Conjunctiva/sclera: Conjunctivae normal.   Cardiovascular:      Rate and Rhythm: Normal rate and regular rhythm.      Heart sounds: No murmur heard.  Pulmonary:      Effort: No respiratory distress.      Breath sounds: Normal breath sounds.   Abdominal:      General: There is no distension.      Palpations: Abdomen is soft.      Tenderness: There is no abdominal tenderness.   Musculoskeletal:         General: Normal range of motion.      Cervical back: Normal range of motion. No tenderness.      Right lower leg: No edema.      Left lower leg: No edema.   Skin:     General: Skin is warm.      Findings: No bruising or rash.   Neurological:      Mental Status: She is alert.      Cranial Nerves: No cranial nerve deficit.      Motor: No weakness.   Psychiatric:         Attention and Perception: Attention normal.         Mood and Affect: Mood is depressed.         Speech: Speech normal.         Behavior: Behavior normal.          Result Review  Data Reviewed:{ Labs  Result Review  Imaging  Med Tab  Media :23}                Assessment and Plan {CC Problem List  Visit Diagnosis  ROS   Review (Popup)  [facility]  Quality  BestPractice  Medications  SmartSets  SnapShot Encounters  Media :23}   Diagnoses and all orders for this visit:    1. Physical exam, routine (Primary)  -     CBC & Differential  -     Comprehensive Metabolic Panel  -     Lipid Panel  -     TSH Rfx On Abnormal To Free T4    2. Fatigue, unspecified type  -     CBC & Differential  -     Comprehensive Metabolic Panel  -     Lipid Panel  -     TSH Rfx On Abnormal To Free T4    3. Anxiety disorder, unspecified type  -     TSH Rfx On Abnormal To Free T4    4. Irritable bowel syndrome with diarrhea  -     Comprehensive Metabolic Panel        Patient Instructions   Continue current medications and regular exercise.  Counseling encouraged.  If increased anxiety and depression continue please follow up so we can discuss additional medications and treatment options.  Keep mammogram and pap smear as scheduled later this month.  Follow up with Dr. Mares for your IBS and screening colonoscopy.   You had lab tests today. You should receive a call or my chart message with your test results. If you have not received your results in the next 7-10 days, please contact the office.         Patient was given instructions and counseling regarding her condition or for health maintenance advice on the AVS.       Return in about 1 year (around 2/10/2024) for Annual.    Elizabeth West MD

## 2023-02-10 NOTE — PROGRESS NOTES
Venipuncture Blood Specimen Collection  Venipuncture performed in RV by Milka Dumas MA with good hemostasis. Patient tolerated the procedure well without complications.   02/10/23   Milka Dumas MA

## 2023-02-10 NOTE — PATIENT INSTRUCTIONS
Continue current medications and regular exercise.  Counseling encouraged.  If increased anxiety and depression continue please follow up so we can discuss additional medications and treatment options.  Keep mammogram and pap smear as scheduled later this month.  Follow up with Dr. Mares for your IBS and screening colonoscopy.   You had lab tests today. You should receive a call or my chart message with your test results. If you have not received your results in the next 7-10 days, please contact the office.

## 2023-02-11 LAB
ALBUMIN SERPL-MCNC: 4.6 G/DL (ref 3.8–4.9)
ALBUMIN/GLOB SERPL: 2 {RATIO} (ref 1.2–2.2)
ALP SERPL-CCNC: 92 IU/L (ref 44–121)
ALT SERPL-CCNC: 11 IU/L (ref 0–32)
AST SERPL-CCNC: 19 IU/L (ref 0–40)
BASOPHILS # BLD AUTO: 0 X10E3/UL (ref 0–0.2)
BASOPHILS NFR BLD AUTO: 0 %
BILIRUB SERPL-MCNC: 0.5 MG/DL (ref 0–1.2)
BUN SERPL-MCNC: 13 MG/DL (ref 6–24)
BUN/CREAT SERPL: 17 (ref 9–23)
CALCIUM SERPL-MCNC: 9.5 MG/DL (ref 8.7–10.2)
CHLORIDE SERPL-SCNC: 102 MMOL/L (ref 96–106)
CHOLEST SERPL-MCNC: 167 MG/DL (ref 100–199)
CO2 SERPL-SCNC: 26 MMOL/L (ref 20–29)
CREAT SERPL-MCNC: 0.77 MG/DL (ref 0.57–1)
EGFRCR SERPLBLD CKD-EPI 2021: 93 ML/MIN/1.73
EOSINOPHIL # BLD AUTO: 0 X10E3/UL (ref 0–0.4)
EOSINOPHIL NFR BLD AUTO: 1 %
ERYTHROCYTE [DISTWIDTH] IN BLOOD BY AUTOMATED COUNT: 11.8 % (ref 11.7–15.4)
GLOBULIN SER CALC-MCNC: 2.3 G/DL (ref 1.5–4.5)
GLUCOSE SERPL-MCNC: 84 MG/DL (ref 70–99)
HCT VFR BLD AUTO: 38.9 % (ref 34–46.6)
HCV AB S/CO SERPL IA: <0.1 S/CO RATIO (ref 0–0.9)
HDLC SERPL-MCNC: 56 MG/DL
HGB BLD-MCNC: 12.9 G/DL (ref 11.1–15.9)
IMM GRANULOCYTES # BLD AUTO: 0 X10E3/UL (ref 0–0.1)
IMM GRANULOCYTES NFR BLD AUTO: 0 %
LDLC SERPL CALC-MCNC: 95 MG/DL (ref 0–99)
LYMPHOCYTES # BLD AUTO: 1.3 X10E3/UL (ref 0.7–3.1)
LYMPHOCYTES NFR BLD AUTO: 26 %
MCH RBC QN AUTO: 30 PG (ref 26.6–33)
MCHC RBC AUTO-ENTMCNC: 33.2 G/DL (ref 31.5–35.7)
MCV RBC AUTO: 91 FL (ref 79–97)
MONOCYTES # BLD AUTO: 0.4 X10E3/UL (ref 0.1–0.9)
MONOCYTES NFR BLD AUTO: 8 %
NEUTROPHILS # BLD AUTO: 3.2 X10E3/UL (ref 1.4–7)
NEUTROPHILS NFR BLD AUTO: 65 %
PLATELET # BLD AUTO: 264 X10E3/UL (ref 150–450)
POTASSIUM SERPL-SCNC: 4.2 MMOL/L (ref 3.5–5.2)
PROT SERPL-MCNC: 6.9 G/DL (ref 6–8.5)
RBC # BLD AUTO: 4.3 X10E6/UL (ref 3.77–5.28)
SODIUM SERPL-SCNC: 143 MMOL/L (ref 134–144)
TRIGL SERPL-MCNC: 84 MG/DL (ref 0–149)
TSH SERPL DL<=0.005 MIU/L-ACNC: 0.68 UIU/ML (ref 0.45–4.5)
VLDLC SERPL CALC-MCNC: 16 MG/DL (ref 5–40)
WBC # BLD AUTO: 5 X10E3/UL (ref 3.4–10.8)

## 2023-03-03 ENCOUNTER — TELEPHONE (OUTPATIENT)
Dept: GASTROENTEROLOGY | Facility: CLINIC | Age: 53
End: 2023-03-03

## 2023-03-03 NOTE — TELEPHONE ENCOUNTER
Caller: Nicki Fowler    Relationship: Self    Best call back number: [phone]      Who are you requesting to speak with (clinical staff, provider,  specific staff member): PROCEDURE SCHEDULERS      Do you require a callback: 5 YR RECALL COLONOSCOPY. PT CALLING IN TO SCHEDULE

## 2023-03-08 ENCOUNTER — PRE-PROCEDURE SCREENING (OUTPATIENT)
Dept: GASTROENTEROLOGY | Facility: CLINIC | Age: 53
End: 2023-03-08
Payer: COMMERCIAL

## 2023-04-19 PROBLEM — F43.20 ADJUSTMENT DISORDER: Status: ACTIVE | Noted: 2023-04-19

## 2023-04-19 PROBLEM — F41.9 ANXIETY: Chronic | Status: ACTIVE | Noted: 2023-04-19
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
## 2023-04-20 ENCOUNTER — OFFICE VISIT (OUTPATIENT)
Dept: BEHAVIORAL HEALTH | Facility: CLINIC | Age: 53
End: 2023-04-20
Payer: COMMERCIAL

## 2023-04-20 VITALS
WEIGHT: 126.6 LBS | HEIGHT: 62 IN | OXYGEN SATURATION: 97 % | SYSTOLIC BLOOD PRESSURE: 98 MMHG | HEART RATE: 84 BPM | BODY MASS INDEX: 23.3 KG/M2 | RESPIRATION RATE: 18 BRPM | DIASTOLIC BLOOD PRESSURE: 68 MMHG

## 2023-04-20 DIAGNOSIS — Z79.899 HIGH RISK MEDICATION USE: ICD-10-CM

## 2023-04-20 DIAGNOSIS — F33.1 MDD (MAJOR DEPRESSIVE DISORDER), RECURRENT EPISODE, MODERATE: ICD-10-CM

## 2023-04-20 DIAGNOSIS — F41.0 GENERALIZED ANXIETY DISORDER WITH PANIC ATTACKS: Primary | ICD-10-CM

## 2023-04-20 DIAGNOSIS — F41.1 GENERALIZED ANXIETY DISORDER WITH PANIC ATTACKS: Primary | ICD-10-CM

## 2023-04-20 RX ORDER — CETIRIZINE HYDROCHLORIDE 10 MG/1
10 TABLET ORAL DAILY
COMMUNITY

## 2023-04-20 RX ORDER — ALPRAZOLAM 0.25 MG/1
0.25 TABLET ORAL 2 TIMES DAILY PRN
Qty: 60 TABLET | Refills: 0 | Status: SHIPPED | OUTPATIENT
Start: 2023-04-20

## 2023-04-20 RX ORDER — CITALOPRAM 10 MG/1
TABLET ORAL
Qty: 21 TABLET | Refills: 0 | Status: SHIPPED | OUTPATIENT
Start: 2023-04-20 | End: 2023-05-18

## 2023-04-20 NOTE — PATIENT INSTRUCTIONS
Plan of Care:    Medication changes: Okay to start Celexa/citalopram, take one half of a 2 mg tablet or 5 mg a day for 2 weeks then increase to 1 tablet.  Take with small amount of food starting out, patient educated on S/S of serotonin syndrome in regards to daily Pamelor for IBS.  If S/S occur stop medication and call 911 or go to nearest emergency room.  Patient educated on black box warning of Celexa, if thoughts of death, dying, or suicide worsen stop medication and call 911 or go to nearest emergency room  Okay to start Xanax 0.25 mg up to twice a day as needed for acute anxiety or panic, do not take every day, do not mix with alcohol, do not take and drive or do something where you can injure yourself, first time trying medication be at home in a safe environment will likely make you relaxed or sleepy  Cognitive behavioral therapy or CBT recommended, referral placed to Sheila FRAUSTO who shares my office  Patient is agreeable to call the office with any worsening of symptoms or onset of intolerable side effects. Patient is agreeable to call 911 or go to the nearest ER should he/she begin having thoughts of hurting themself or other people.

## 2023-04-20 NOTE — ASSESSMENT & PLAN NOTE
Patient's depression is recurrent and is moderate without psychosis. Their depression is currently active and the condition is newly identified. This will be reassessed in 2 weeks. F/U as described:patient was prescribed an antidepressant medicine and patient was referred to Social Work.

## 2023-04-20 NOTE — ASSESSMENT & PLAN NOTE
Psychological condition is newly identified.  Medication changes per orders.  Referral to psychological counseling.  Psychological condition  will be reassessed in 2 weeks.

## 2023-04-23 ENCOUNTER — PREP FOR SURGERY (OUTPATIENT)
Dept: OTHER | Facility: HOSPITAL | Age: 53
End: 2023-04-23
Payer: COMMERCIAL

## 2023-04-23 DIAGNOSIS — Z86.010 HISTORY OF ADENOMATOUS POLYP OF COLON: Primary | ICD-10-CM

## 2023-04-25 ENCOUNTER — TELEPHONE (OUTPATIENT)
Dept: GASTROENTEROLOGY | Facility: CLINIC | Age: 53
End: 2023-04-25
Payer: COMMERCIAL

## 2023-04-25 PROBLEM — Z86.010 HISTORY OF ADENOMATOUS POLYP OF COLON: Status: ACTIVE | Noted: 2023-04-25

## 2023-04-25 PROBLEM — Z86.0101 HISTORY OF ADENOMATOUS POLYP OF COLON: Status: ACTIVE | Noted: 2023-04-25

## 2023-04-25 NOTE — TELEPHONE ENCOUNTER
LINH Hui for COLONOSCOPY on 7/7/23  arrive at  830am . mailed prep instructions to verified address on file....[place]

## 2023-05-05 ENCOUNTER — TELEMEDICINE (OUTPATIENT)
Dept: BEHAVIORAL HEALTH | Facility: CLINIC | Age: 53
End: 2023-05-05
Payer: COMMERCIAL

## 2023-05-05 DIAGNOSIS — F41.0 GENERALIZED ANXIETY DISORDER WITH PANIC ATTACKS: Primary | ICD-10-CM

## 2023-05-05 DIAGNOSIS — F33.1 MDD (MAJOR DEPRESSIVE DISORDER), RECURRENT EPISODE, MODERATE: ICD-10-CM

## 2023-05-05 DIAGNOSIS — F41.1 GENERALIZED ANXIETY DISORDER WITH PANIC ATTACKS: Primary | ICD-10-CM

## 2023-05-05 DIAGNOSIS — Z79.899 HIGH RISK MEDICATION USE: ICD-10-CM

## 2023-05-05 RX ORDER — CITALOPRAM 20 MG/1
20 TABLET ORAL DAILY
Qty: 30 TABLET | Refills: 2 | Status: SHIPPED | OUTPATIENT
Start: 2023-05-05

## 2023-05-05 NOTE — PATIENT INSTRUCTIONS
Medication changes: Continue celexa slow taper, 10 mg/day X 2 weeks then increase to 20 mg/day and continue till F/U appt, monitor for S/S of serotonin syndrome that we discussed, handout attached for pt to review, new script sent to pharmacy  Patient educated that psychotherapy is a potential non medication option that has been proven to be equally as effective as medication in treating a variety of mental disorders. Effectiveness is increased when the two are combined.  Keep upcoming appt with Sheila FRAUSTO at [place] for counseling  Patient is agreeable to call the office with any worsening of symptoms or onset of intolerable side effects. Patient is agreeable to call 911 or go to the nearest ER should he/she begin having thoughts of hurting themself or other people.

## 2023-05-05 NOTE — PROGRESS NOTES
"Patient assessed today via MyChart through [facility] pt is in her office in a secure environment and verbalizes privacy during interview. JAMES Brooks is at home in a secure environment using a secure laptop. The patient's condition being diagnosed/treated is appropriate for telemedicine. The provider identified himself as well as his credentials.   The patient, and/or patient's guardian, consent to be seen remotely, and when consent is given they understand that the consent allows for patient identifiable information to be sent to a third party as needed.   They may refuse to be seen remotely at any time. The electronic data is encrypted and password protected, and the patient and/or guardian has been advised of the potential risks to privacy not withstanding such measures.    Subjective   Nicki Fowler is a 52 y.o. female who presents today 05/05/2023     CC: F/U appt med management for depression/anxiety  .  HPI:    \"No changes since last time I saw you\" per pt.    Pt seen for the first time roughly 2 weeks ago, xanax bid prn & low dose celexa ordered at that time for depression/anxiety/panic. Not much different since last time seen, using prn xanax twice a weekly, getting around 6-7 hrs of sleep at night. Has appt in a few weeks with alfredo lux for counseling 5/17/23. Training for a ultra marathon, working out more, pushes herself to go. Has used box breathing did not help in the past.    Patient educated on slow onset of antidepressants and need to be extra cautious due to taking a TCA Pamelor, S/S of serotonin syndrome discussed today, patient denies currently, told to monitor at home.  Okay to continue slow taper, will do Celexa 10 mg X 2 weeks then increase to 28, new prescription sent to pharmacy, patient to follow-up in 1 month.                Medical/surgical/social/family history reviewed and updated, problem list reviewed/updated, medications and allergies reviewed/updated.      Social History "     Socioeconomic History   • Marital status:      Spouse name: Bob   • Number of children: 3   • Years of education: BA   • Highest education level: Bachelor's degree (e.g., BA, AB, BS)   Tobacco Use   • Smoking status: Former     Packs/day: 2.00     Years: 20.00     Pack years: 40.00     Types: Cigarettes     Start date: 1/1/1983     Quit date: 1/1/2008     Years since quitting: 15.3   • Smokeless tobacco: Never   • Tobacco comments:     stopped 5 years for pregnancies   Vaping Use   • Vaping Use: Never used   Substance and Sexual Activity   • Alcohol use: Yes     Alcohol/week: 2.0 standard drinks     Types: 2 Glasses of wine per week     Comment: Social   • Drug use: No     Comment: delta 8 rarely   • Sexual activity: Yes     Partners: Male     Birth control/protection: Vasectomy      Social History     Social History Narrative    Patient works for [employer], reports it is a Monday through Friday 9-5 type hours, highest level of education is a bachelor's degree, patient lives with spouse whose name is Bob and youngest son Reji Guerrero.  Patient has a 26-year-old daughter named Nusrat, oldest son is Sher age 24.      Patient Active Problem List   Diagnosis   • Chronic diarrhea   • Irritable bowel syndrome with diarrhea   • Cervical radiculopathy   • Lumbar radiculopathy   • Generalized anxiety disorder with panic attacks   • MDD (major depressive disorder), recurrent episode, moderate   • High risk medication use   • History of adenomatous polyp of colon     Past Medical History:   Diagnosis Date   • Anxiety and depression    • Arthritis    • Cervical disc disorder    • Closed fracture of coccyx 09/2013   • Colon polyp 04/30/2018    Dr. Mares removed during colonoscopy   • Dizziness     constant x 3 weeks   • Headache    • History of seasonal allergies    • Hyperlipidemia    • IBS (irritable bowel syndrome)    • Lactose intolerance early 20's    dairy causes bloating and gas   • Low back pain     • Lumbosacral disc disease    • Meniere's disease    • Peripheral neuropathy        Allergy:   Allergies   Allergen Reactions   • Cinnamon Other (See Comments) and Shortness Of Breath   • Shellfish-Derived Products Hives        Current Medications:   Current Outpatient Medications on File Prior to Visit   Medication Sig Dispense Refill   • ALPRAZolam (Xanax) 0.25 MG tablet Take 1 tablet by mouth 2 (Two) Times a Day As Needed for Anxiety (panic). 60 tablet 0   • cetirizine (zyrTEC) 10 MG tablet Take 1 tablet by mouth Daily.     • FIBER PO Take  by mouth Every Morning.     • nortriptyline (Pamelor) 10 MG capsule Take 2 capsules by mouth Every Night. 180 capsule 3   • [DISCONTINUED] citalopram (CeleXA) 10 MG tablet Take 0.5 tablets by mouth Every Morning for 14 days, THEN 1 tablet Every Morning for 14 days. 21 tablet 0     No current facility-administered medications on file prior to visit.     Review of Systems   Constitutional: Negative.    Respiratory: Negative.  Negative for chest tightness and shortness of breath.    Cardiovascular: Negative for chest pain.   Neurological: Negative.  Negative for dizziness and light-headedness.   Psychiatric/Behavioral: Positive for depression and anxiety no improvement     Objective   Physical Exam  Constitutional:       Appearance: Normal appearance, appears in no acute distress  Pulmonary:      Effort: Pulmonary effort is normal.      Comments: No shortness of breath reported  Musculoskeletal:      Comments: No recent falls/injury reported   Neurological:      Mental Status: He/she is alert and oriented to person, place, and time.   Psychiatric:         Thought Content: Thought content normal.         Judgment: Judgment normal.     PHQ-9 Depression Screening  Little interest or pleasure in doing things? (P) 3-->nearly every day   Feeling down, depressed, or hopeless? (P) 3-->nearly every day   Trouble falling or staying asleep, or sleeping too much? (P) 2-->more than half the  days   Feeling tired or having little energy? (P) 2-->more than half the days   Poor appetite or overeating? (P) 1-->several days   Feeling bad about yourself/you are a failure/have let yourself or your family down? (P) 1-->several days   Trouble concentrating on things? (P) 2-->more than half the days   Psychomotor agitation/retardation (P) 0-->not at all   Thoughts about death/dying/suicide (P) 0-->not at all   PHQ-9 Total Score (P) 14   How difficult have these problems for you? (P) somewhat difficult     GAD7 Documentation:  Feeling nervous, anxious or on edge (P) 3   Not being able to stop or control worrying (P) 2   Worrying too much about different things (P) 2   Trouble relaxing (P) 2   Being so restless that it is hard to sit still (P) 2   Becoming easily annoyed or irritable (P) 3   Feeling Afraid as if something awful might happen (P) 1   EDITH Total Score (P) 15   How difficult have these problems made it for you? (P) Very difficult     Mental Status Exam:   Hygiene:   good  Cooperation:  Cooperative  Eye Contact:  Good  Psychomotor Behavior:  Appropriate  Affect:  Appropriate  Mood: normal  Speech:  Normal  Thought Process:  Goal directed  Thought Content:  Normal  Suicidal:  None  Homicidal:  None  Hallucinations:  None  Delusion:  None  Memory:  Intact  Reliability:  fair  Insight:  Fair  Judgement:  Fair  Impulse Control:  Fair    Vital Signs:   There were no vitals taken for this visit.   No LMP recorded.     Wt Readings from Last 5 Encounters:   04/20/23 57.4 kg (126 lb 9.6 oz)   02/10/23 57.6 kg (127 lb)   12/08/22 58.6 kg (129 lb 3.2 oz)   02/24/22 55.8 kg (123 lb)   02/08/22 53.1 kg (117 lb)     BP Readings from Last 5 Encounters:   04/20/23 98/68   02/10/23 119/80   03/28/22 115/75   02/24/22 113/74   02/08/22 119/81     Pulse Readings from Last 5 Encounters:   04/20/23 84   02/10/23 83   03/28/22 84   02/24/22 92   02/08/22 91     Lab Results:  Common labs        2/10/2023    10:45   Common Labs    Glucose 84     BUN 13     Creatinine 0.77     Sodium 143     Potassium 4.2     Chloride 102     Calcium 9.5     Total Protein 6.9     Albumin 4.6     Total Bilirubin 0.5     Alkaline Phosphatase 92     AST (SGOT) 19     ALT (SGPT) 11     WBC 5.0     Hemoglobin 12.9     Hematocrit 38.9     Platelets 264     Total Cholesterol 167     Triglycerides 84     HDL Cholesterol 56     LDL Cholesterol  95        EKG Results:  No orders to display     Imaging Results:  No Images in the past 120 days found..    Assessment & Plan   Problem List Items Addressed This Visit        Psychiatry Problems    Generalized anxiety disorder with panic attacks - Primary (Chronic)    Current Assessment & Plan     Psychological condition is unchanged.  Medication changes per orders.  Referral to psychological counseling.  Psychological condition  will be reassessed in 4 weeks.         Relevant Medications    citalopram (CeleXA) 20 MG tablet    MDD (major depressive disorder), recurrent episode, moderate (Chronic)    Current Assessment & Plan     Patient's depression is recurrent and is moderate without psychosis. Their depression is currently active and the condition is unchanged. This will be reassessed in 4 weeks. F/U as described:patient was prescribed an antidepressant medicine and patient was referred to Social Work.         Relevant Medications    citalopram (CeleXA) 20 MG tablet       Other    High risk medication use    Overview     Patient has been prescribed a benzodiazepine, educated that this would be used on a short-term basis only, narcotics contract, CORBY Wood Q 3 months minimum.     Patient currently taking Pamelor 20 mg for IBS, risk versus benefit of adding another serotonergic agent discussed, low-dose Celexa ordered, will continue to monitor, patient educated on S/S of serotonin syndrome, reports to [facility] immediately or go to ER if they occur.         Current Assessment & Plan     CORBY reviewed/appropriate            1. Generalized anxiety disorder with panic attacks    2. MDD (major depressive disorder), recurrent episode, moderate    3. High risk medication use       PSYCHOTHERAPY:  In/Start Time: 0800.  Out/Stop Time: 0816.  16 minutes of face to face direct patient care with patient was spent for supportive psychotherapy including strengthening self awareness and insights, strengthening coping skills, counseling the patient regarding diagnoses, and utilizing cognitive behavioral therapy to assist the patient in recognizing more appropriate coping mechanisms which are proven effective in reducing the severity of frequency of symptoms.  This APRN assisted the patient in processing the patient's diagnoses, and also acknowledged and normalized the patient's thoughts, feelings, and concerns This APRN allowed the patient to freely discuss issues without interruption or judgment.  This APRN answered any questions the patient had regarding medication and treatment plan.    • Pt history, review of systems, medications, allergies, reviewed, patient was screened today for depression/anxiety, PHQ/EDITH scores reviewed.  Most recent vitals/labs reviewed.  • Pt was given appropriate time to ask questions and concerns were addressed. A thorough discussion was had that included review of disease process, need for continued monitoring and additional treatment options including use of pharmacological and non-pharmacological approaches to care, decisions were made and agreed upon by patient and provider.   • Discussed the risks, benefits, and potential side effects of the medications; patient ackowledged and verbally consented.     Patient Instructions     1. Medication changes: Continue celexa slow taper, 10 mg/day X 2 weeks then increase to 20 mg/day and continue till F/U appt, monitor for S/S of serotonin syndrome that we discussed, handout attached for pt to review, new script sent to pharmacy  2. Patient educated that psychotherapy is  a potential non medication option that has been proven to be equally as effective as medication in treating a variety of mental disorders. Effectiveness is increased when the two are combined.  3. Keep upcoming appt with Sheila FRAUSTO at [place] for counseling  4. Patient is agreeable to call the office with any worsening of symptoms or onset of intolerable side effects. Patient is agreeable to call 911 or go to the nearest ER should he/she begin having thoughts of hurting themself or other people.           Return in about 1 month (around 6/5/2023) for Medication Check, Video visit.    Medications Issues:    Medications Discontinued During This Encounter   Medication Reason   • citalopram (CeleXA) 10 MG tablet      New Medications Ordered This Visit   Medications   • citalopram (CeleXA) 20 MG tablet     Sig: Take 1 tablet by mouth Daily. ONLY START AFTER BEING ON 10 MG/DAY FOR (2) WEEKS     Dispense:  30 tablet     Refill:  2     Barriers: high risk medication and stress  Strengths:  motivated     Progress toward goal: Not at goal  Functional Status: Moderate impairment   Prognosis: Fair with Ongoing Treatment     No show policy:  We understand unexpected circumstances arise; however, anytime you miss your appointment we are unable to provide you appropriate care.  In addition, each appointment missed could have been used to provide care for others.  We ask that you call at least 24 hours in advance to cancel or reschedule an appointment. We would like to take this opportunity to remind you of our policy stating patients who miss THREE or more appointments without cancelling or rescheduling 24 hours in advance of the appointment may be subject to cancellation of any further visits with our clinic and recommendation to seek in-person services/visits. Please call [phone] to reschedule your appointment. If there are reasons that make it difficult for you to keep the appointments, please call and let us know how  we can help. Please understand that medication prescribing will not continue without seeing your provider.      This document has been electronically signed by JAYDA Hays  May 5, 2023 09:03 EDT    Part of this note may be an electronic transcription/translation of spoken language to printed text using the Dragon Dictation System. Some of the data in this electronic note has been brought forward from a previous encounter, any necessary changes have been made, it has been reviewed by this APRN, and it is accurate.

## 2023-05-05 NOTE — ASSESSMENT & PLAN NOTE
Psychological condition is unchanged.  Medication changes per orders.  Referral to psychological counseling.  Psychological condition  will be reassessed in 4 weeks.   117

## 2023-05-05 NOTE — ASSESSMENT & PLAN NOTE
Patient's depression is recurrent and is moderate without psychosis. Their depression is currently active and the condition is unchanged. This will be reassessed in 4 weeks. F/U as described:patient was prescribed an antidepressant medicine and patient was referred to Social Work.

## 2023-05-15 RX ORDER — CITALOPRAM 10 MG/1
TABLET ORAL
Qty: 21 TABLET | Refills: 0 | OUTPATIENT
Start: 2023-05-15

## 2023-05-17 ENCOUNTER — OFFICE VISIT (OUTPATIENT)
Dept: BEHAVIORAL HEALTH | Facility: CLINIC | Age: 53
End: 2023-05-17
Payer: COMMERCIAL

## 2023-05-17 DIAGNOSIS — F41.1 GENERALIZED ANXIETY DISORDER: Primary | ICD-10-CM
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
## 2023-05-18 NOTE — TREATMENT PLAN
Multi-Disciplinary Problems (from Behavioral Health Treatment Plan)    Active Problems     Problem: Anxiety  Start Date: 05/18/23    Problem Details: The patient self-scales this problem as a 6 with 10 being the worst.        Goal Priority Start Date Expected End Date End Date    Patient will develop and implement behavioral and cognitive strategies to reduce anxiety and irrational fears. -- 05/18/23 -- --    Goal Details: Progress toward goal:  The patient self-scales their progress related to this goal as a 8 with 10 being the worst.        Goal Intervention Frequency Start Date End Date    Help patient explore past emotional issues in relation to present anxiety. Q2 Weeks 05/18/23 --    Intervention Details: Duration of treatment until until remission of symptoms.        Goal Intervention Frequency Start Date End Date    Help patient develop an awareness of their cognitive and physical responses to anxiety. Q2 Weeks 05/18/23 --    Intervention Details: Duration of treatment until 6/30/2023.              Problem: Relationship Issues  Start Date: 05/18/23    Problem Details: The patient self-scales this problem as a 7 with 10 being the worst.        Goal Priority Start Date Expected End Date End Date    Patient will initiate personal change to improve relationships. -- 05/18/23 -- --    Goal Details: Progress toward goal:  The patient self-scales their progress related to this goal as a 8 with 10 being the worst.        Goal Intervention Frequency Start Date End Date    Assist patient in identifying behaviors that focus on relationship building and process the changes needed to improve relationships. Q2 Weeks 05/18/23 --    Intervention Details: Duration of treatment until until remission of symptoms.              Problem: Self Esteem  Start Date: 05/18/23    Problem Details: The patient self-scales this problem as a 9 with 10 being the worst.        Goal Priority Start Date Expected End Date End Date    Patient  will demonstrate the ability to internalize positive cognitive messages that is also reflected in behavioral changes. -- 05/18/23 -- --    Goal Details: Progress toward goal:  Not appropriate to rate progress toward goal since this is the initial treatment plan.        Goal Intervention Frequency Start Date End Date    Assist patient in identifying distorted negative beliefs about self and the world. Q2 Weeks 05/18/23 --    Intervention Details: Duration of treatment until until remission of symptoms.        Goal Intervention Frequency Start Date End Date    Reinforce use of more realistic positive messages about to self in interpreting life events. Q2 Weeks 05/18/23 --    Intervention Details: Duration of treatment until until remission of symptoms.                    Reviewed By     Sheila Faust LCSW 05/18/23 1632    Sheila Faust LCSW 05/18/23 1540    Sheila Faust, LISBET 05/18/23 1530    Sheila Faust, LISBET 05/18/23 1529                 I have discussed and reviewed this treatment plan with the patient.  It has been printed for signatures.

## 2023-05-18 NOTE — TREATMENT PLAN
Multi-Disciplinary Problems (from Behavioral Health Treatment Plan)    Active Problems     Problem: Anxiety  Start Date: 05/18/23    Problem Details: The patient self-scales this problem as a 6 with 10 being the worst.        Goal Priority Start Date Expected End Date End Date    Patient will develop and implement behavioral and cognitive strategies to reduce anxiety and irrational fears. -- 05/18/23 -- --    Goal Details: Progress toward goal:  The patient self-scales their progress related to this goal as a 8 with 10 being the worst.        Goal Intervention Frequency Start Date End Date    Help patient explore past emotional issues in relation to present anxiety. Q2 Weeks 05/18/23 --    Intervention Details: Duration of treatment until {Duration of Treatment:2100017900}.        Goal Intervention Frequency Start Date End Date    Help patient develop an awareness of their cognitive and physical responses to anxiety. Q2 Weeks 05/18/23 --    Intervention Details: Duration of treatment until {Duration of Treatment:2100017900}.              Problem: Relationship Issues  Start Date: 05/18/23    Problem Details: The patient self-scales this problem as a 7 with 10 being the worst.        Goal Priority Start Date Expected End Date End Date    Patient will initiate personal change to improve relationships. -- 05/18/23 -- --    Goal Details: Progress toward goal:  The patient self-scales their progress related to this goal as a 8 with 10 being the worst.        Goal Intervention Frequency Start Date End Date    Assist patient in identifying behaviors that focus on relationship building and process the changes needed to improve relationships. Q2 Weeks 05/18/23 --    Intervention Details: Duration of treatment until until remission of symptoms.              Problem: Self Esteem  Start Date: 05/18/23    Problem Details: The patient self-scales this problem as a 9 with 10 being the worst.        Goal Priority Start Date Expected  End Date End Date    Patient will demonstrate the ability to internalize positive cognitive messages that is also reflected in behavioral changes. -- 05/18/23 -- --    Goal Details: Progress toward goal:  Not appropriate to rate progress toward goal since this is the initial treatment plan.        Goal Intervention Frequency Start Date End Date    Assist patient in identifying distorted negative beliefs about self and the world. Q2 Weeks 05/18/23 --    Intervention Details: Duration of treatment until until remission of symptoms.        Goal Intervention Frequency Start Date End Date    Reinforce use of more realistic positive messages about to self in interpreting life events. Q2 Weeks 05/18/23 --    Intervention Details: Duration of treatment until until remission of symptoms.                    Reviewed By     Sheila Faust LCSW 05/18/23 1632    Sheila Faust LCSW 05/18/23 1540    Sheila Faust LCSW 05/18/23 1530    Sheila Faust LCSW 05/18/23 1529                 I have discussed and reviewed this treatment plan with the patient.  It has been printed for signatures.

## 2023-05-18 NOTE — TREATMENT PLAN
Multi-Disciplinary Problems (from Behavioral Health Treatment Plan)    Active Problems     Problem: Anxiety  Start Date: 05/18/23    Problem Details: The patient self-scales this problem as a {Numbers; 1-10:71112} with 10 being the worst.        Goal Priority Start Date Expected End Date End Date    Patient will develop and implement behavioral and cognitive strategies to reduce anxiety and irrational fears. -- 05/18/23 -- --    Goal Details: Progress toward goal:  {PSY Progress Goal:7041891273}        Goal Intervention Frequency Start Date End Date    Help patient explore past emotional issues in relation to present anxiety. Weekly 05/18/23 --    Intervention Details: Duration of treatment until {Duration of Treatment:2100017900}.        Goal Intervention Frequency Start Date End Date    Help patient develop an awareness of their cognitive and physical responses to anxiety. Weekly 05/18/23 --    Intervention Details: Duration of treatment until {Duration of Treatment:2100017900}.              Problem: Relationship Issues  Start Date: 05/18/23    Problem Details: The patient self-scales this problem as a {Numbers; 1-10:26371} with 10 being the worst.        Goal Priority Start Date Expected End Date End Date    Patient will initiate personal change to improve relationships. -- 05/18/23 -- --    Goal Details: Progress toward goal:  {PSY Progress Goal:[id number]}        Goal Intervention Frequency Start Date End Date    Assist patient in identifying behaviors that focus on relationship building and process the changes needed to improve relationships. Weekly 05/18/23 --    Intervention Details: Duration of treatment until {Duration of Treatment:2100017900}.              Problem: Self Esteem  Start Date: 05/18/23    Problem Details: The patient self-scales this problem as a {Numbers; 1-10:12678} with 10 being the worst.        Goal Priority Start Date Expected End Date End Date    Patient will demonstrate the ability to  internalize positive cognitive messages that is also reflected in behavioral changes. -- 05/18/23 -- --    Goal Details: Progress toward goal:  {PSY Progress Goal:[id number]}        Goal Intervention Frequency Start Date End Date    Assist patient in identifying distorted negative beliefs about self and the world. Weekly 05/18/23 --    Intervention Details: Duration of treatment until {Duration of Treatment:2100017900}.        Goal Intervention Frequency Start Date End Date    Reinforce use of more realistic positive messages about to self in interpreting life events. Weekly 05/18/23 --    Intervention Details: Duration of treatment until {Duration of Treatment:2100017900}.                    Reviewed By     Sheila Faust LCSW 05/18/23 1530    Sheila Faust LCSW 05/18/23 1529                 I have discussed and reviewed this treatment plan with the patient.  It has been printed for signatures.

## 2023-06-01 NOTE — PROGRESS NOTES
"Patient assessed today via MyChart through [facility] pt is in her office in a secure environment and verbalizes privacy during interview. JAMES Brooks is at home in a secure environment using a secure laptop. The patient's condition being diagnosed/treated is appropriate for telemedicine. The provider identified himself as well as his credentials.   The patient, and/or patient's guardian, consent to be seen remotely, and when consent is given they understand that the consent allows for patient identifiable information to be sent to a third party as needed.   They may refuse to be seen remotely at any time. The electronic data is encrypted and password protected, and the patient and/or guardian has been advised of the potential risks to privacy not withstanding such measures.    Subjective   Nicki Fowler is a 52 y.o. female who presents today 06/02/2023     CC: F/U appt med management for depression/anxiety  .  HPI:    \"I'm doing pretty good\" per pt.     Patient reports she is feeling overall much better, this last week she has noticed significant improvement, has been on Celexa for at least a month now, no side effects reported or in evidence, reports she feels pretty good overall, not as tired, not as grumpy, feeling more herself, reports she has used Xanax maybe once since last time seen trying not to use which I am fine with, patient has seen Sheila FRAUSTO once has follow-up appointment today, it was agreed to keep medications where they are at and for patient to follow-up in 3 months, appointment scheduled.                Medical/surgical/social/family history reviewed and updated, problem list reviewed/updated, medications and allergies reviewed/updated.      Social History     Socioeconomic History   • Marital status:      Spouse name: Bob   • Number of children: 3   • Years of education: BA   • Highest education level: Bachelor's degree (e.g., BA, AB, BS)   Tobacco Use   • Smoking status: Former     " Packs/day: 2.00     Years: 20.00     Pack years: 40.00     Types: Cigarettes     Start date: 1/1/1983     Quit date: 1/1/2008     Years since quitting: 15.4   • Smokeless tobacco: Never   • Tobacco comments:     stopped 5 years for pregnancies   Vaping Use   • Vaping Use: Never used   Substance and Sexual Activity   • Alcohol use: Yes     Alcohol/week: 2.0 standard drinks     Types: 2 Glasses of wine per week     Comment: Social   • Drug use: No     Comment: delta 8 rarely   • Sexual activity: Yes     Partners: Male     Birth control/protection: Vasectomy      Social History     Social History Narrative    Patient works for [employer], reports it is a Monday through Friday 9-5 type hours, highest level of education is a bachelor's degree, patient lives with spouse whose name is Bob and youngest son Reji Guerrero.  Patient has a 26-year-old daughter named Nusrat, oldest son is Sher age 24.      Patient Active Problem List   Diagnosis   • Chronic diarrhea   • Irritable bowel syndrome with diarrhea   • Cervical radiculopathy   • Lumbar radiculopathy   • Generalized anxiety disorder with panic attacks   • MDD (major depressive disorder), recurrent episode, moderate   • High risk medication use   • History of adenomatous polyp of colon     Past Medical History:   Diagnosis Date   • Anxiety and depression    • Arthritis    • Cervical disc disorder    • Closed fracture of coccyx 09/2013   • Colon polyp 04/30/2018    Dr. Mares removed during colonoscopy   • Dizziness     constant x 3 weeks   • Headache    • History of seasonal allergies    • Hyperlipidemia    • IBS (irritable bowel syndrome)    • Lactose intolerance early 20's    dairy causes bloating and gas   • Low back pain    • Lumbosacral disc disease    • Meniere's disease    • Peripheral neuropathy        Allergy:   Allergies   Allergen Reactions   • Cinnamon Other (See Comments) and Shortness Of Breath   • Shellfish-Derived Products Hives      Current  Medications:   Current Outpatient Medications on File Prior to Visit   Medication Sig Dispense Refill   • ALPRAZolam (Xanax) 0.25 MG tablet Take 1 tablet by mouth 2 (Two) Times a Day As Needed for Anxiety (panic). 60 tablet 0   • cetirizine (zyrTEC) 10 MG tablet Take 1 tablet by mouth Daily.     • citalopram (CeleXA) 20 MG tablet Take 1 tablet by mouth Daily. ONLY START AFTER BEING ON 10 MG/DAY FOR (2) WEEKS 30 tablet 2   • FIBER PO Take  by mouth Every Morning.     • nortriptyline (Pamelor) 10 MG capsule Take 2 capsules by mouth Every Night. 180 capsule 3     No current facility-administered medications on file prior to visit.     Review of Systems   Constitutional: Negative.    Respiratory: Negative.  Negative for chest tightness and shortness of breath.    Cardiovascular: Negative for chest pain.   Neurological: Negative.  Negative for dizziness and light-headedness.   Psychiatric/Behavioral: Positive for depression and anxiety improved    Objective   Physical Exam  Constitutional:       Appearance: Normal appearance, appears in no acute distress  Pulmonary:      Effort: Pulmonary effort is normal.      Comments: No shortness of breath reported  Musculoskeletal:      Comments: No recent falls/injury reported   Neurological:      Mental Status: He/she is alert and oriented to person, place, and time.   Psychiatric:         Thought Content: Thought content normal.         Judgment: Judgment normal.     PHQ-9 Depression Screening  Little interest or pleasure in doing things? (P) 1-->several days   Feeling down, depressed, or hopeless? (P) 0-->not at all   Trouble falling or staying asleep, or sleeping too much? (P) 1-->several days   Feeling tired or having little energy? (P) 1-->several days   Poor appetite or overeating? (P) 0-->not at all   Feeling bad about yourself/you are a failure/have let yourself or your family down? (P) 0-->not at all   Trouble concentrating on things? (P) 1-->several days   Psychomotor  agitation/retardation (P) 0-->not at all   Thoughts about death/dying/suicide (P) 0-->not at all   PHQ-9 Total Score (P) 4   How difficult have these problems for you? (P) not difficult at all     GAD7 Documentation:  Feeling nervous, anxious or on edge (P) 1   Not being able to stop or control worrying (P) 1   Worrying too much about different things (P) 1   Trouble relaxing (P) 1   Being so restless that it is hard to sit still (P) 0   Becoming easily annoyed or irritable (P) 0   Feeling Afraid as if something awful might happen (P) 0   EDITH Total Score (P) 4   How difficult have these problems made it for you? (P) Not difficult at all     Mental Status Exam:   Hygiene:   good  Cooperation:  Cooperative  Eye Contact:  Good  Psychomotor Behavior:  Appropriate  Affect:  Appropriate  Mood: normal  Speech:  Normal  Thought Process:  Goal directed  Thought Content:  Normal  Suicidal:  None  Homicidal:  None  Hallucinations:  None  Delusion:  None  Memory:  Intact  Reliability:  fair  Insight:  Fair  Judgement:  Fair  Impulse Control:  Fair    Vital Signs:   There were no vitals taken for this visit.   No LMP recorded.     Wt Readings from Last 5 Encounters:   04/20/23 57.4 kg (126 lb 9.6 oz)   02/10/23 57.6 kg (127 lb)   12/08/22 58.6 kg (129 lb 3.2 oz)   02/24/22 55.8 kg (123 lb)   02/08/22 53.1 kg (117 lb)     BP Readings from Last 5 Encounters:   04/20/23 98/68   02/10/23 119/80   03/28/22 115/75   02/24/22 113/74   02/08/22 119/81     Pulse Readings from Last 5 Encounters:   04/20/23 84   02/10/23 83   03/28/22 84   02/24/22 92   02/08/22 91     Lab Results:  Common labs        2/10/2023    10:45   Common Labs   Glucose 84     BUN 13     Creatinine 0.77     Sodium 143     Potassium 4.2     Chloride 102     Calcium 9.5     Total Protein 6.9     Albumin 4.6     Total Bilirubin 0.5     Alkaline Phosphatase 92     AST (SGOT) 19     ALT (SGPT) 11     WBC 5.0     Hemoglobin 12.9     Hematocrit 38.9     Platelets 264      Total Cholesterol 167     Triglycerides 84     HDL Cholesterol 56     LDL Cholesterol  95        EKG Results:  No orders to display     Imaging Results:  No Images in the past 120 days found..    Assessment & Plan   Problem List Items Addressed This Visit        Psychiatry Problems    Generalized anxiety disorder with panic attacks - Primary (Chronic)    MDD (major depressive disorder), recurrent episode, moderate (Chronic)       Other    High risk medication use    Overview     Patient has been prescribed a benzodiazepine, educated that this would be used on a short-term basis only, narcotics contract, [facility] Q 3 months minimum.     Patient currently taking Pamelor 20 mg for IBS, risk versus benefit of adding another serotonergic agent discussed, low-dose Celexa ordered, will continue to monitor, patient educated on S/S of serotonin syndrome, reports to [facility] immediately or go to ER if they occur.           1. Generalized anxiety disorder with panic attacks    2. MDD (major depressive disorder), recurrent episode, moderate    3. High risk medication use       • Pt history, review of systems, medications, allergies, reviewed, patient was screened today for depression/anxiety, PHQ/EDITH scores reviewed.  Most recent vitals/labs reviewed.  • Pt was given appropriate time to ask questions and concerns were addressed. A thorough discussion was had that included review of disease process, need for continued monitoring and additional treatment options including use of pharmacological and non-pharmacological approaches to care, decisions were made and agreed upon by patient and provider.   • Discussed the risks, benefits, and potential side effects of the medications; patient ackowledged and verbally consented.     Patient Instructions     1. Medication changes: None continue Celexa 20 mg a day and Xanax as needed, patient reports she is use Xanax very seldomly maybe once since last appointment  2. Therapy  recommendation: Patient has second appointment with Sheila FRAUSTO today 6/3/2023  3. Monitoring required: Every 3 months if patient still using as needed Xanax  4. Please call the office with any worsening of symptoms or onset of intolerable side effects. Patient is agreeable to call 911 or go to the nearest ER should he/she/they have any thoughts of harm to self or others.  5. Patient has been educated regarding multimodal approach with healthy nutrition, healthy sleep, regular physical activity, social activities, counseling, and medications.       Return in about 3 months (around 9/2/2023) for Medication Check, Video visit.    Medications Issues:    There are no discontinued medications.  No orders of the defined types were placed in this encounter.    Barriers: high risk medication and stress  Strengths:  motivated     Progress toward goal: At goal  Functional Status: Moderate impairment   Prognosis: Fair with Ongoing Treatment     No show policy:  We understand unexpected circumstances arise; however, anytime you miss your appointment we are unable to provide you appropriate care.  In addition, each appointment missed could have been used to provide care for others.  We ask that you call at least 24 hours in advance to cancel or reschedule an appointment. We would like to take this opportunity to remind you of our policy stating patients who miss THREE or more appointments without cancelling or rescheduling 24 hours in advance of the appointment may be subject to cancellation of any further visits with our clinic and recommendation to seek in-person services/visits. Please call [phone] to reschedule your appointment. If there are reasons that make it difficult for you to keep the appointments, please call and let us know how we can help. Please understand that medication prescribing will not continue without seeing your provider.      This document has been electronically signed by Jayjay Burgess  APRN  June 2, 2023 08:14 EDT    Part of this note may be an electronic transcription/translation of spoken language to printed text using the Dragon Dictation System. Some of the data in this electronic note has been brought forward from a previous encounter, any necessary changes have been made, it has been reviewed by this APRN, and it is accurate.

## 2023-06-02 ENCOUNTER — OFFICE VISIT (OUTPATIENT)
Dept: BEHAVIORAL HEALTH | Facility: CLINIC | Age: 53
End: 2023-06-02

## 2023-06-02 ENCOUNTER — TELEMEDICINE (OUTPATIENT)
Dept: BEHAVIORAL HEALTH | Facility: CLINIC | Age: 53
End: 2023-06-02

## 2023-06-02 DIAGNOSIS — Z79.899 HIGH RISK MEDICATION USE: ICD-10-CM

## 2023-06-02 DIAGNOSIS — F33.1 MODERATE EPISODE OF RECURRENT MAJOR DEPRESSIVE DISORDER: ICD-10-CM

## 2023-06-02 DIAGNOSIS — F41.1 GENERALIZED ANXIETY DISORDER WITH PANIC ATTACKS: Primary | Chronic | ICD-10-CM

## 2023-06-02 DIAGNOSIS — F41.1 GENERALIZED ANXIETY DISORDER: Primary | ICD-10-CM

## 2023-06-02 DIAGNOSIS — F33.1 MDD (MAJOR DEPRESSIVE DISORDER), RECURRENT EPISODE, MODERATE: Chronic | ICD-10-CM

## 2023-06-02 DIAGNOSIS — F41.0 GENERALIZED ANXIETY DISORDER WITH PANIC ATTACKS: Primary | Chronic | ICD-10-CM

## 2023-06-02 NOTE — PATIENT INSTRUCTIONS
Medication changes: None continue Celexa 20 mg a day and Xanax as needed, patient reports she is use Xanax very seldomly maybe once since last appointment  Therapy recommendation: Patient has second appointment with Sheila FRAUSTO today 6/3/2023  Monitoring required: Every 3 months if patient still using as needed Xanax  Please call the office with any worsening of symptoms or onset of intolerable side effects. Patient is agreeable to call 911 or go to the nearest ER should he/she/they have any thoughts of harm to self or others.  Patient has been educated regarding multimodal approach with healthy nutrition, healthy sleep, regular physical activity, social activities, counseling, and medications.

## 2023-06-02 NOTE — PROGRESS NOTES
Date of Service: June 2, 2023  Length of Session: 45 minutes    IDENTIFYING  INFORMATION:   Nicki Fowler is a 52 y.o. female who met 1:1 with the undersigned for a regularly scheduled individual outpatient therapy session at [address] [place] [address].     SI/SHB:no    Progress Since Last Session: Client reports things have been better since last session.  She also admits that there have been fewer episodes in which she has talked to her brother about her son.  Or spoken with her son just 2 tabs.  Today client rates her anxiety over the past 2 weeks at a 4 out of 10.          Clinical Intervention(s): Clinical interventions used were cognitive behavioral therapy techniques, motivational interviewing techniques, and solution focused techniques.          Response to Intervention(s): Client responded well to interventions.  Client spent time talking about the way she feels that leads to her anxiety when she thinks about her son and his current circumstances.  Therapist asked client what she thinks her brother would do if client's son created discord or destroyed property while he is living with her brother.  Client says she was not sure.  Client engaged in a discussion about the physical versus cognitive effects that anxiety can have on her.  Client was engaged and able to name several of each.  Therapist and client had a discussion about the difference between the 2 and how she has less control over the physical effects versus the cognitive effects.  Therapist also pointed out that the cognitive effects she listed were connected to cognitive distortions and negative self talk.  Client was receptive to this and seemed to understand the concept.          Goals Addressed: Goals addressed today were to discuss cognitive distortions, and discussed the difference between physical and cognitive effects on anxiety.        Assessment & Plan   Diagnoses and all orders for this visit:    1. Generalized anxiety  disorder (Primary)    2. Moderate episode of recurrent major depressive disorder          Mental Status Exam:   Hygiene:   good  Cooperation:  Cooperative  Eye Contact:  Good  Psychomotor Behavior:  Restless  Affect:  Appropriate  Speech:  Normal  Thought Progress:  Linear  Thought Content:  Normal  Suicidal:  None  Homicidal:  None  Hallucinations:  None  Delusion:  None  Memory:  Intact  Orientation:  Person, Place, Time and Situation  Reliability:  good  Insight:  Good  Judgement:  Good  Impulse Control:  Not assessed        Progress toward goal: Developments continue in the area of reducing anxiety. The ongoing treatment plan includes therapeutic work on reducing anxiety, and identifying cognitive distortions behind his anxiety. Current outstanding therapeutic issues are: Anxiety reduction. Treatment to continue as indicated.     Functional Status: Client's functioning better       Prognosis: Improved        Plan: Client will come to next session with a list of negative thoughts she says to herself about client, his behavior client and his current circumstances.  We will use these to turn them into positive statements.     Return in about 3 weeks (around 6/23/2023) for Next scheduled follow up.    Patient will continue in individual outpatient therapy session [facility] every 2 weeks and pharmacotherapy as scheduled.  Patient will adhere to medication regimen as prescribed and report any side effects. Patient will contact this office, call 911 or present to the nearest emergency room should suicidal or homicidal ideations occur.          This document signed by Sheila Faust LCSW electronically Sheila Faust LCSW  June 2, 2023 16:04 EDT

## 2023-07-11 PROBLEM — F33.1 MDD (MAJOR DEPRESSIVE DISORDER), RECURRENT EPISODE, MODERATE: Status: ACTIVE | Noted: 2023-07-11

## 2023-07-27 ENCOUNTER — TELEMEDICINE (OUTPATIENT)
Dept: BEHAVIORAL HEALTH | Facility: CLINIC | Age: 53
End: 2023-07-27
Payer: COMMERCIAL

## 2023-07-27 DIAGNOSIS — F33.1 MDD (MAJOR DEPRESSIVE DISORDER), RECURRENT EPISODE, MODERATE: ICD-10-CM

## 2023-07-27 DIAGNOSIS — F41.1 GENERALIZED ANXIETY DISORDER: Primary | ICD-10-CM

## 2023-07-27 NOTE — PROGRESS NOTES
"Access to MH/SA Psychotherapy Notes:  A licensed health care professional has determined, in the exercise of professional judgment, that the access requested is reasonably likely to endanger the life or physical safety of the individual or another person.  Date: July 27, 2023    This provider is located at the [facility]) [address] [place] [address] using a secure [employer] Video Visit through [employer]. Patient is being seen remotely via telehealth at home address in [place] and stated they are in a secure environment for this session. The patient's condition being diagnosed/treated is appropriate for telemedicine. The provider identified herself as well as her credentials. The patient, and/or patients guardian, consent to be seen remotely, and when consent is given they understand that the consent allows for patient identifiable information to be sent to a third party as needed. They may refuse to be seen remotely at any time. The electronic data is encrypted and password protected, and the patient and/or guardian has been advised of the potential risks to privacy not withstanding such measures.     You have chosen to receive care through a telehealth visit.  Do you consent to use a video/audio connection for your medical care today? Yes  Subjective   Date of Service: July 27, 2023  Length of Session: 45 minutes    Chief Complaint: Depression, anxiety    IDENTIFYING  INFORMATION:   Nicki Fowler is a 52 y.o. female who met 1:1 with the undersigned for a regularly scheduled individual outpatient therapy session at [address] [place] [address].     SI/SHB:no    Progress Since Last Session: Client reports things have been difficult since last session.  She states \"the other shoe dropped.\"  She states that her son began texting and calling her with irate and negative interactions when he and her brother got into an argument.  Client " "reports as a result her anxiety increased significantly but since she has had no contact with him recently her anxiety has decreased again.  However client reports that her depression initially increased and has not gone back down.          Clinical Intervention(s): Reviewed progress, cognitive behavioral techniques, anxiety reduction techniques, psychoeducation          Response to Intervention(s): Client presented well to session and was engaged.  However she was tearful.  Client reviewed homework from previous session about boundaries that she can set with her son.  Client talked about her other children blaming her for the situation with her son saying that it is her fault and that she just needs to \"cut him off\" client talked about how she had just received raise and a promotion at work right before all of this happened and now she cannot even be happy about it because everything seems to overshadow the positives.  She and therapist discussed the reaction of her other children and how it must come from their concern for her and watching her get taken advantage of by her son.  Client identified her feeling as being defeated and the thought that goes along with that \"I cannot have anything that is good for me\" admitting that this makes her angry.  Client says the behavior that results is that she just stops trying and minimizes the positive in her life.  Therapist validated her feelings and talked to client about it being time for her to put her own needs first and indicated this is the idea of her setting boundaries with her son.  The boundaries she is identified so far are: Establishing what we can do for him, saying he cannot come back here to live, and defining how he can speak to me.  Postchallenge client that the boundaries we set around responsibilities that we have with her children and that these become much less as our children exceed the age of 18 years.  Her homework for next session is to " "specifically outline her responsibilities to her son and more importantly her responsibilities to herself keeping in mind that after he turns 18 her responsibilities for her son diminished significantly while her responsibilities to herself increase.          Goals Addressed: Assist in identifying distorted beliefs about self, reinforce use of more realistic and positive messages about self in interpreting life events, help client explore past emotional issues in relation to present anxiety        Assessment & Plan client presents to session is tearful reporting a recent decrease in anxiety after an increase with interaction with her son but an overall increase in her depression.  Client talked about how her other children are blaming her for the situation with her son and how she feels defeated.  Client states \"I cannot have anything that is good for me.\"  Talked with client about setting boundaries and encouraged her to be very specific in these boundaries being related to her responsibilities to son and herself.  Her homework for next session is to identify what responsibilities she has for her son and for herself moving forward.    Diagnoses and all orders for this visit:    1. Generalized anxiety disorder (Primary)    2. MDD (major depressive disorder), recurrent episode, moderate          Mental Status Exam:   Hygiene:    Unable to discern via telemedicine  Cooperation:  Cooperative  Eye Contact:  Good  Psychomotor Behavior:  Restless  Affect:  Blunted  Speech:  Normal  Thought Progress:  Linear  Thought Content:  Mood congruent  Suicidal:  None  Homicidal:  None  Hallucinations:  None  Delusion:  None  Memory:  Intact  Orientation:  Person, Place, Time, and Situation  Reliability:  good  Insight:  Fair  Judgement:  Fair  Impulse Control:  Good        Progress toward goal: Developments continue in the area of anxiety reduction and reduction of depression. The ongoing treatment plan includes therapeutic work on " reduction of depression, reduction of anxiety, relationship building and building self-esteem. Current outstanding therapeutic issues are: Building self-esteem and relationship building. Treatment to continue as indicated.     Functional Status: Fair       Prognosis: Guarded        Plan: Continue treatment as indicated     Return in about 2 weeks (around 8/10/2023) for Next scheduled follow up, Video visit.    [facility] No Show Policy:  We understand unexpected circumstances arise; however, anytime you miss your appointment we are unable to provide you appropriate care.  In addition, each appointment missed could have been used to provide care for others.  We ask that you call at least 24 hours in advance to cancel or reschedule an appointment.  We would like to take this opportunity to remind you of our policy stating patients who miss THREE or more appointments without cancelling or rescheduling 24 hours in advance of the appointment may be subject to cancellation of any further visits with our clinic and recommendation to seek in-person services/visits.    Please call [phone] to reschedule your appointment. If there are reasons that make it difficult for you to keep the appointments, please call and let us know how we can help.  Please understand that medication prescribing will not continue without seeing your provider.      [facility]'s No Show Policy reviewed with patient at today's visit. Patient verbalized understanding of this policy. Discussed with patient that in the event that there are three or more no show visits, it will be recommended that they pursue in-person services/visits as noncompliance with telehealth visits indicates that patient is not an appropriate candidate for telemedicine and would likely be more appropriate for in-person services/visits. Patient verbalizes understanding and is agreeable to this.       This document signed by  Sheila Faust LCSW electronically Sheila Faust LCSW  July 27, 2023 09:05 EDT

## 2023-08-05 DIAGNOSIS — F41.0 GENERALIZED ANXIETY DISORDER WITH PANIC ATTACKS: ICD-10-CM

## 2023-08-05 DIAGNOSIS — F41.1 GENERALIZED ANXIETY DISORDER WITH PANIC ATTACKS: ICD-10-CM

## 2023-08-05 DIAGNOSIS — F33.1 MDD (MAJOR DEPRESSIVE DISORDER), RECURRENT EPISODE, MODERATE: ICD-10-CM

## 2023-08-07 RX ORDER — CITALOPRAM 20 MG/1
TABLET ORAL
Qty: 30 TABLET | Refills: 2 | Status: SHIPPED | OUTPATIENT
Start: 2023-08-07

## 2023-08-10 ENCOUNTER — TELEMEDICINE (OUTPATIENT)
Dept: BEHAVIORAL HEALTH | Facility: CLINIC | Age: 53
End: 2023-08-10
Payer: COMMERCIAL

## 2023-08-10 DIAGNOSIS — F41.1 GENERALIZED ANXIETY DISORDER WITH PANIC ATTACKS: Primary | ICD-10-CM

## 2023-08-10 DIAGNOSIS — F41.0 GENERALIZED ANXIETY DISORDER WITH PANIC ATTACKS: Primary | ICD-10-CM

## 2023-08-10 DIAGNOSIS — F33.1 MDD (MAJOR DEPRESSIVE DISORDER), RECURRENT EPISODE, MODERATE: ICD-10-CM

## 2023-08-10 NOTE — PROGRESS NOTES
Access to MH/SA Psychotherapy Notes:  A licensed health care professional has determined, in the exercise of professional judgment, that the access requested is reasonably likely to endanger the life or physical safety of the individual or another person.  Date: August 10, 2023    This provider is located at the [facility]) [address] [place] [address] using a secure [employer] Video Visit through [employer]. Patient is being seen remotely via telehealth at home address in [place] and stated they are in a secure environment for this session. The patient's condition being diagnosed/treated is appropriate for telemedicine. The provider identified herself as well as her credentials. The patient, and/or patients guardian, consent to be seen remotely, and when consent is given they understand that the consent allows for patient identifiable information to be sent to a third party as needed. They may refuse to be seen remotely at any time. The electronic data is encrypted and password protected, and the patient and/or guardian has been advised of the potential risks to privacy not withstanding such measures.     You have chosen to receive care through a telehealth visit.  Do you consent to use a video/audio connection for your medical care today? Yes  Subjective   Date of Service: August 10, 2023  Length of Session: 45 minutes    Chief Complaint: Anxiety, depression    IDENTIFYING  INFORMATION:   Nicki Fowler is a 53 y.o. female who met 1:1 with the undersigned for a regularly scheduled individual outpatient therapy session at [address] [place] [address].     SI/SHB:no    Progress Since Last Session: Client reports that she has had a quiet time since last session.  She has had one conversation with her son since her last session and it was uneventful.  Client has had no arguments with her son and he only contacted her to talk about  "something casual.  There have been no confrontations or heated discussions.  Client reports that there for her anxiety and depression have been down.  Client does continue to report that kinds of conversations leave her \"waiting for the other shoe to drop.\"          Clinical Intervention(s): Reviewed progress, psychoeducation, cognitive behavioral techniques, anxiety reduction techniques, solution focused techniques          Response to Intervention(s): Client was present and engaged throughout session.  Client did complete her homework from last session.  Client did review with therapist her interactions with staff when client was in the hospital on a 72-hour hold in which her son and the hospital staff contacted her about picking client up from the hospital when he had only been there for 48 hours.  Client and therapist discussed possible diagnoses for client issued while he was in the hospital and client states that she suspected he had some kind of diagnosis related to psychosis because of the medication he was taking.  Client's homework was about identifying client's responsibilities to her son at this point in her life as well as to her self.  Client was able to identify that realistically she has no responsibilities to her son because he is an adult.  However client states that in her head her responsibility to him was \"giving him help if he appreciates it.\"  Client talked about the boundaries discussed in previous sessions and she said that she knows that she has to set them but enforcing them was what she does not know how to do.  Client identified the responsibilities to herself as to let her son live his own life and therapist pointed out that even when client identified her own responsibilities she still related them to her son.  Client and therapist talked about how she needs to put herself first and ways that she can work on this.  Client states she feels like \"I have lost myself.\"  Client's homework " between now and next session is to find and connect with a codependents Anonymous meeting and think back to what it was like before she had children and identify which she enjoyed as well as what responsibilities she had to herself then.          Goals Addressed: Help client identify healthy boundaries and relationships        Assessment & Plan client presented to session reporting a decrease in her anxiety and depression due to lack of contact with her son.  Client talked about reading a book related to codependency.  Client did complete her homework but still cannot identify her responsibilities to herself or how to enforce the boundaries she knows she needs to set with him.  Client's homework for next session is to identify and attend a codependents Anonymous meeting and think back to before she had children and identify what she enjoyed doing as well as the responsibility she had to herself at that time.    Diagnoses and all orders for this visit:    1. Generalized anxiety disorder with panic attacks (Primary)    2. MDD (major depressive disorder), recurrent episode, moderate          Mental Status Exam:   Hygiene:    Discern via telemedicine  Cooperation:  Cooperative  Eye Contact:  Good  Psychomotor Behavior:  Appropriate  Affect:  Appropriate, tearful  Speech:  Normal  Thought Progress:  Linear  Thought Content:  Mood congruent  Suicidal:  None  Homicidal:  None  Hallucinations:  None  Delusion:  None  Memory:  Intact  Orientation:  Person, Place, Time, and Situation  Reliability:  good  Insight:  Poor  Judgement:  Fair  Impulse Control:  Good        Progress toward goal: Developments continue in the area of reduction of anxiety, reduction of depression. The ongoing treatment plan includes therapeutic work on duction of anxiety, reduction of depression. Current outstanding therapeutic issues are: None. Treatment to continue as indicated.     Functional Status: Fair       Prognosis: Promising        Plan:  Treatment as indicated     Return in about 2 weeks (around 8/24/2023) for Next scheduled follow up, Video visit.    [facility] No Show Policy:  We understand unexpected circumstances arise; however, anytime you miss your appointment we are unable to provide you appropriate care.  In addition, each appointment missed could have been used to provide care for others.  We ask that you call at least 24 hours in advance to cancel or reschedule an appointment.  We would like to take this opportunity to remind you of our policy stating patients who miss THREE or more appointments without cancelling or rescheduling 24 hours in advance of the appointment may be subject to cancellation of any further visits with our clinic and recommendation to seek in-person services/visits.    Please call [phone] to reschedule your appointment. If there are reasons that make it difficult for you to keep the appointments, please call and let us know how we can help.  Please understand that medication prescribing will not continue without seeing your provider.      [facility]'s No Show Policy reviewed with patient at today's visit. Patient verbalized understanding of this policy. Discussed with patient that in the event that there are three or more no show visits, it will be recommended that they pursue in-person services/visits as noncompliance with telehealth visits indicates that patient is not an appropriate candidate for telemedicine and would likely be more appropriate for in-person services/visits. Patient verbalizes understanding and is agreeable to this.       This document signed by Sheila Faust LCSW electronically Sheila Faust LCSW  August 10, 2023 15:59 EDT

## 2023-09-01 ENCOUNTER — TELEMEDICINE (OUTPATIENT)
Dept: BEHAVIORAL HEALTH | Facility: CLINIC | Age: 53
End: 2023-09-01
Payer: COMMERCIAL

## 2023-09-01 DIAGNOSIS — F41.0 GENERALIZED ANXIETY DISORDER WITH PANIC ATTACKS: Primary | ICD-10-CM

## 2023-09-01 DIAGNOSIS — F41.1 GENERALIZED ANXIETY DISORDER WITH PANIC ATTACKS: Primary | ICD-10-CM

## 2023-09-01 DIAGNOSIS — F33.1 MDD (MAJOR DEPRESSIVE DISORDER), RECURRENT EPISODE, MODERATE: ICD-10-CM

## 2023-09-01 RX ORDER — TRIAMTERENE AND HYDROCHLOROTHIAZIDE 37.5; 25 MG/1; MG/1
TABLET ORAL
COMMUNITY
Start: 2023-08-02

## 2023-09-01 NOTE — PATIENT INSTRUCTIONS
Medication changes: Continue celexam 20 mg/day, pamelor 10 mg/day (written by GI for IBS), xanax 0.25 prn as previously ordered. Uses xanax very seldomly (twice in the last 3 months) medication filled one time in the past 12 months.    Therapy recommendation: Continue counseling/therapy with established therapist Sheila FRAUSTO

## 2023-09-01 NOTE — PROGRESS NOTES
Patient assessed today via MyChart through EP[employer] pt is at home in a secure environment and verbalizes privacy during interview. JAMES Brooks is at home in a secure environment using a secure laptop.The patient's condition being diagnosed/treated is appropriate for telemedicine.The provider identified himself as well as his credentials.The patient, and/or patient's guardian, consent to be seen remotely, and when consent is given they understand that the consent allows for patient identifiable information to be sent to a third party as needed. They may refuse to be seen remotely at any time. The electronic data is encrypted and password protected, and the patient and/or guardian has been advised of the potential risks to privacy not withstanding such measures.    [facility] PROGRESS NOTE  [facility]  [address], [place] [address]    NAME: Nicki Fowler     : 1970   MRN: [id number]   PCP: Elizabeth West MD     DATE: 2023    ALLERGY:Cinnamon and Shellfish-derived products     MEDICATIONS:  ALPRAZolam  cetirizine  citalopram  FIBER PO  nortriptyline  triamterene-hydrochlorothiazide     VITALS: There were no vitals taken for this visit. No LMP recorded. Patient is perimenopausal.     Subjective     Chief Complaint   Patient presents with    Anxiety    Depression    Follow-up        HPI:  53 y.o. female patient seen for follow up. Patient last seen 3 months ago, no med changes at that time.    Since last appt pt reports improvement in condition being treated, diagnosis listed below. Pt endorses daily compliance with psychiatric medications. Since last appt pt denies new medical problems. Current S/S reviewed with pt, PHQ/EDITH scores reviewed with pt and are stable. Sleep disturbance is denied except having trouble waking up in the AM on/off, not using any sleep aid, sleep is described as generally restful overall. No side effects to meds are currently reported or in evidence  during assessment.  The patient would like to not adjust or change their medications at this time.                Social Status:  Ethnic Group: Not  Or   Race: White Or   Marital Status:    Employment status: Full Time [employer]       SUBSTANCE/SEXUAL HISTORY:   reports that she quit smoking about 15 years ago. Her smoking use included cigarettes. She started smoking about 40 years ago. She has a 40.00 pack-year smoking history. She has never used smokeless tobacco. She reports current alcohol use of about 2.0 standard drinks per week. She reports that she does not use drugs.   reports being sexually active and has had partner(s) who are male. She reports using the following method of birth control/protection: Vasectomy.      FAMILY HISTORY:  family history includes Arthritis in her brother, brother, and mother; Asthma in her daughter; Atrial fibrillation in her mother; Cancer in her father and mother; Dementia in her paternal uncle; Heart attack in her father; Heart disease in her maternal aunt and mother; Hyperlipidemia in her brother and mother; Hypertension in her brother and mother; Stroke in her father; Thyroid disease in her mother.     PAST MEDICAL HISTORY   has a past medical history of Anxiety and depression, Arthritis, Cervical disc disorder, Closed fracture of coccyx (09/2013), Colon polyp (04/30/2018), Dizziness, Headache, History of seasonal allergies, Hyperlipidemia, IBS (irritable bowel syndrome), Lactose intolerance (early 20's), Low back pain, Lumbosacral disc disease, Meniere's disease, and Peripheral neuropathy.    She has no past medical history of AAA (abdominal aortic aneurysm), Abnormal ECG, Alcoholism, Anemia, Aneurysm, Arteriovenous malformation, Asthma, Simons esophagus, Bleeding disorder, Brain concussion, Cancer, Carotid artery occlusion, Celiac disease, CHF (congestive heart failure), Cholelithiasis, Chronic kidney disease, Cirrhosis,  Claustrophobia, Clotting disorder, Colon cancer, COPD (chronic obstructive pulmonary disease), Coronary artery disease, Crohn's disease, CTS (carpal tunnel syndrome), Deep vein thrombosis, Diabetes mellitus, Disease of thyroid gland, Diverticulitis of colon, Esophageal varices, Fatty liver, GERD (gastroesophageal reflux disease), GI (gastrointestinal bleed), Gout, Hard to intubate, Head injury, Hernia, History of transfusion, HIV disease, Hypertension, Infectious viral hepatitis, Liver disease, Malignant hyperthermia due to anesthesia, MRSA (methicillin resistant Staphylococcus aureus), Myocardial infarction, Pancreatitis, PONV (postoperative nausea and vomiting), Pulmonary arterial hypertension, Renal insufficiency, Seizures, Sickle cell anemia, Spinal headache, Stroke, Substance abuse, Thoracic disc disorder, TIA (transient ischemic attack), Ulcer, or Ulcerative colitis.     Review of Systems   Constitutional: Negative.  Negative for chills and fever.   Respiratory:  Negative for chest tightness and shortness of breath.    Cardiovascular:  Negative for chest pain.   Gastrointestinal:  Negative for nausea and vomiting.   Neurological:  Positive for dizziness.   Psychiatric/Behavioral:  Positive for sleep disturbance. Negative for suicidal ideas. The patient is nervous/anxious.      Objective   Physical Exam  Constitutional:       Appearance: Normal appearance.   HENT:      Head: Normocephalic.   Pulmonary:      Effort: Pulmonary effort is normal.   Skin:     General: Skin is dry.   Neurological:      General: No focal deficit present.      Mental Status: She is alert and oriented to person, place, and time.   Psychiatric:         Mood and Affect: Mood normal.         Behavior: Behavior normal.         Thought Content: Thought content normal.     PHQ-9 Depression Screening  Little interest or pleasure in doing things? (P) 1-->several days   Feeling down, depressed, or hopeless? (P) 1-->several days   Trouble falling  or staying asleep, or sleeping too much? (P) 2-->more than half the days   Feeling tired or having little energy?     Poor appetite or overeating? (P) 0-->not at all   Feeling bad about yourself/you are a failure/have let yourself or your family down? (P) 1-->several days   Trouble concentrating on things? (P) 1-->several days   Psychomotor agitation/retardation (P) 0-->not at all   Thoughts about death/dying/suicide (P) 1-->several days   PHQ-9 Total Score (P) 9   How difficult have these problems for you? (P) somewhat difficult     GAD7 Documentation:  Feeling nervous, anxious or on edge (P) 1   Not being able to stop or control worrying (P) 1   Worrying too much about different things (P) 1   Trouble relaxing (P) 1   Being so restless that it is hard to sit still (P) 1   Becoming easily annoyed or irritable (P) 1   Feeling Afraid as if something awful might happen (P) 1   EDITH Total Score (P) 7   How difficult have these problems made it for you? (P) Somewhat difficult     MENTAL STATUS EXAM   General Appearance:  Cleanly groomed and dressed  Eye Contact:  Good eye contact  Attitude:  Cooperative  Motor Activity:  Normal gait, posture  Speech:  Normal rate, tone, volume  Mood and affect:  Normal, pleasant  Thought Process:  Goal-directed  Associations/ Thought Content:  No delusions  Hallucinations:  None  Suicidal Ideations:  Not present  Homicidal Ideation:  Not present  Orientation:  Person, place, time and situation  Fund of Knowledge:  Appropriate for age and educational level  Intellectual Functioning:  Average range  Insight:  Fair  Judgement:  Fair  Reliability:  Fair  Impulse Control:  Fair     LABS:  CBC          2/10/2023    10:45   CBC   WBC 5.0    RBC 4.30    Hemoglobin 12.9    Hematocrit 38.9    MCV 91    MCH 30.0    MCHC 33.2    RDW 11.8    Platelets 264       CMP          2/10/2023    10:45   CMP   Glucose 84    BUN 13    Creatinine 0.77    Sodium 143    Potassium 4.2    Chloride 102    Calcium 9.5     Total Protein 6.9    Albumin 4.6    Globulin 2.3    Total Bilirubin 0.5    Alkaline Phosphatase 92    AST (SGOT) 19    ALT (SGPT) 11    BUN/Creatinine Ratio 17         Assessment    ASSESSMENT/TREATMENT PLAN/INSTRUCTIONS/EDUCATION    (F41.1,  F41.0) Generalized anxiety disorder with panic attacks    (F33.1) MDD (major depressive disorder), recurrent episode, moderate     -The above listed condition/conditions are stable    AFTER VISIT SUMMARY INSTRUCTIONS:    Medication changes: Continue celexam 20 mg/day, pamelor 10 mg/day (written by GI for IBS), xanax 0.25 prn as previously ordered. Uses xanax very seldomly (twice in the last 3 months) medication filled one time in the past 12 months.  Therapy recommendation: Continue counseling/therapy with established therapist Sheila FRAUSTO    -Please call the office at [phone] with any worsening of symptoms or onset of intolerable side effects, please ask to leave a message with Todd's medical assistant.  Please give my office up to 48 hours to respond to a patient call/question/refill request.  -Patient is agreeable to call 911 or go to the nearest ER should he/she/they have any thoughts of harm to self or others.  -Patient has been educated on providers schedule, contact information, and patient/provider expectations.   -Patient has been educated regarding multimodal approach with healthy nutrition, healthy sleep, regular physical activity, social activities, counseling, and medications.     Return in 3 months (on 12/1/2023) for Medication Check.    ADDITIONAL MONITORING:  -Narc Contract: 4/23  -CORBY Q 3M minimum scanned into EMR: 9/23  -PDMP via EMR interface reviewed during f/u mehul'ts and med refill requests  -UDS: random     -Patient has been educated on the risk versus benefit of being prescribed a controlled substance, patient has been educated on additional monitoring that is required including Corby reports, random urine drug screens, and pill counts.   Narcotics contract has been read/signed by patient and provider, renewed yearly.      Pain Management Panel           No data to display               There are no discontinued medications.      No orders of the defined types were placed in this encounter.    -Barriers: stress  -Strengths:  motivated  and positive attitude    -Progress toward goal: At goal  -Functional Status: Mild impairment   -Prognosis: Good with Ongoing Treatment        SUMMARY/DISCUSSION:  Pt past social/medical/family history reviewed/updated. ROS conducted, medications/allergies, reviewed, patient was screened today for depression/anxiety, PHQ/EDITH scores reviewed.  Most recent vitals/labs reviewed. Pt was given appropriate time to ask questions and concerns were addressed. A thorough discussion was had that included review of disease process, need for continued monitoring and additional treatment options including use of pharmacological and non-pharmacological approaches to care, decisions were made and agreed upon by patient and provider. Discussed the risks, benefits, and potential side effects of the medications; patient ackowledged and verbally consented.     Part of this note may be an electronic transcription/translation of spoken language to printed text using the Dragon Dictation System. Some of the data in this electronic note has been brought forward from a previous encounter, any necessary changes have been made, it has been reviewed by this APRN, and it is accurate.    This document has been electronically signed by JAYDA Hays September 1, 2023 08:15 EDT

## 2023-09-07 ENCOUNTER — TELEMEDICINE (OUTPATIENT)
Dept: BEHAVIORAL HEALTH | Facility: CLINIC | Age: 53
End: 2023-09-07
Payer: COMMERCIAL

## 2023-09-07 DIAGNOSIS — F33.1 MDD (MAJOR DEPRESSIVE DISORDER), RECURRENT EPISODE, MODERATE: ICD-10-CM

## 2023-09-07 DIAGNOSIS — F41.1 GENERALIZED ANXIETY DISORDER WITH PANIC ATTACKS: Primary | ICD-10-CM

## 2023-09-07 DIAGNOSIS — F41.0 GENERALIZED ANXIETY DISORDER WITH PANIC ATTACKS: Primary | ICD-10-CM

## 2023-09-07 NOTE — PROGRESS NOTES
Access to MH/SA Psychotherapy Notes:  A licensed health care professional has determined, in the exercise of professional judgment, that the access requested is reasonably likely to endanger the life or physical safety of the individual or another person.  Date: September 7, 2023    This provider is located at [facility] [address] [place] [address] using a secure [employer] Video Visit through [employer]. Patient is being seen remotely via telehealth at home address in [place] and stated they are in a secure environment for this session. The patient's condition being diagnosed/treated is appropriate for telemedicine. The provider identified herself as well as her credentials. The patient, and/or patients guardian, consent to be seen remotely, and when consent is given they understand that the consent allows for patient identifiable information to be sent to a third party as needed. They may refuse to be seen remotely at any time. The electronic data is encrypted and password protected, and the patient and/or guardian has been advised of the potential risks to privacy not withstanding such measures.     You have chosen to receive care through a telehealth visit.  Do you consent to use a video/audio connection for your medical care today? Yes  Subjective   Date of Service: September 7, 2023  Length of Session: 45 minutes    Chief Complaint: Anxiety, depression    IDENTIFYING  INFORMATION:   Nicki Fowler is a 53 y.o. female who met 1:1 with the undersigned for a regularly scheduled individual outpatient therapy session at [address] [place] [address].     SI/SHB:no    Progress Since Last Session: Client reports that things have been pretty good for her.  She is at actually have quite a bit of contact with her son and none of it has been confrontational.  Client states that she suspects it has been a pattern with him.  And that  usually he can go for periods of time doing well before he crashes.  He has gotten a job and she said this is usually indicative of things being positive.          Clinical Intervention(s): Reviewed progress, psychoeducation, cognitive behavioral techniques, anxiety reduction techniques, solution focused techniques          Response to Intervention(s): Client reported that she did her homework ended a [employer] Anonymous meeting.  Client states that she related to those in attendance and that the information provided registered with her.  Client says that she recognized the description read and connected with many of the members.  Client said it was somewhat comforting to hear that there were other people, parents, dealing with similar issues as her own.  Therapist encouraged her to continue attending those meetings and hopes that she will eventually share her own information and will continue to get support from other parents.  Client said that she intends to do so.  Client and therapist talked about things that she enjoyed or used to enjoy doing.  Client states that she runs long distance races and that is something that she truly gets a lot out of, although she still feels guilty for spending time away from her family.  Therapist and client processed this and therapist discussed how she feels like client has a sense of obligation because even though her children have grown up and moved out of the house she feels like her son still needs her and she feels guilty for doing things for herself.  Client agrees with this.  Client also list things she enjoys as: Reading, doing crafty things although she is not artistic, and going to concerts.  Therapist then used an equal map to help client identifies the various pieces in her life that she needs to nurture.  Client identifies these things as: Running, her brothers, work, her family, her , her household responsibilities, her hobbies, and her son.  Therapist  commented that it was interesting that her son gets a separate identity from everyone else.  Therapist encouraged client to work on identifying how she can nurture each 1 of these connections in her life for the next session as homework.          Goals Addressed:Help patient explore past emotional issues in relation to present anxiety, Assist patient in identifying behaviors that focus on relationship building and process the changes needed to improve relationships, Reinforce use of more realistic positive messages about to self in interpreting life events,          Assessment & Plan presented to session with a bright affect discussing how things have been going pretty well for her since last session.  Client states that she has had quite a bit of back with her son since last session and none of it has been confrontational.  Client did attend a [employer] Anonymous meeting for her homework and says that she really recognized herself in that meeting.  She plans to continue attending.  Client and therapist talked about the things she enjoyed doing as well as to use any come out to identify the various aspects of her life.  For homework client will think about how she can nurture each of these aspects in her life individually.    Diagnoses and all orders for this visit:    1. Generalized anxiety disorder with panic attacks (Primary)    2. MDD (major depressive disorder), recurrent episode, moderate          Mental Status Exam:   Hygiene:    Unable to discern via telemedicine  Cooperation:  Cooperative  Eye Contact:  Good  Psychomotor Behavior:  Appropriate  Affect:  Appropriate  Speech:  Normal  Thought Progress:  Linear  Thought Content:  Mood congruent  Suicidal:  None  Homicidal:  None  Hallucinations:  None  Delusion:  None  Memory:  Intact  Orientation:  Person, Place, Time, and Situation  Reliability:  good  Insight:  Fair  Judgement:  Good  Impulse Control:  Good        Progress toward goal: Developments  continue in the area of reducing anxiety, addressing relationship issues, improving self-esteem. The ongoing treatment plan includes therapeutic work on reducing anxiety, improving relationship issues, improving self-esteem. Current outstanding therapeutic issues are: None. Treatment to continue as indicated.     Functional Status: Good       Prognosis: Good        Plan: Continue treatment as indicated     Return in about 2 weeks (around 9/21/2023) for Next scheduled follow up, Video visit.    [facility] No Show Policy:  We understand unexpected circumstances arise; however, anytime you miss your appointment we are unable to provide you appropriate care.  In addition, each appointment missed could have been used to provide care for others.  We ask that you call at least 24 hours in advance to cancel or reschedule an appointment.  We would like to take this opportunity to remind you of our policy stating patients who miss THREE or more appointments without cancelling or rescheduling 24 hours in advance of the appointment may be subject to cancellation of any further visits with our clinic and recommendation to seek in-person services/visits.    Please call [phone] to reschedule your appointment. If there are reasons that make it difficult for you to keep the appointments, please call and let us know how we can help.  Please understand that medication prescribing will not continue without seeing your provider.      [facility]'s No Show Policy reviewed with patient at today's visit. Patient verbalized understanding of this policy. Discussed with patient that in the event that there are three or more no show visits, it will be recommended that they pursue in-person services/visits as noncompliance with telehealth visits indicates that patient is not an appropriate candidate for telemedicine and would likely be more appropriate for in-person services/visits. Patient  verbalizes understanding and is agreeable to this.       This document signed by Sheila Faust LCSW electronically Sheila Faust LCSW  September 7, 2023 14:02 EDT

## 2023-10-18 NOTE — PROGRESS NOTES
Access to MH/SA Psychotherapy Notes:  A licensed health care professional has determined, in the exercise of professional judgment, that the access requested is reasonably likely to endanger the life or physical safety of the individual or another person.  Date: October 19, 2023    This provider is [facility] at [facility]) [address] [place] [address] using a secure [employer] Video Visit through [employer]. Patient is being seen remotely via telehealth at home address in [place] and stated they are in a secure environment for this session. The patient's condition being diagnosed/treated is appropriate for telemedicine. The provider identified herself as well as her credentials. The patient, and/or patients guardian, consent to be seen remotely, and when consent is given they understand that the consent allows for patient identifiable information to be sent to a third party as needed. They may refuse to be seen remotely at any time. The electronic data is encrypted and password protected, and the patient and/or guardian has been advised of the potential risks to privacy not withstanding such measures.     You have chosen to receive care through a telehealth visit.  Do you consent to use a video/audio connection for your medical care today? Yes  Subjective   Date of Service: October 19, 2023  Length of Session: 45 minutes    Chief Complaint: Anxiety, depression    IDENTIFYING  INFORMATION:   Nicki Fowler is a 53 y.o. female who met 1:1 with the undersigned for a regularly scheduled individual outpatient therapy session at [address] [place] [address].     SI/SHB: None    Progress Since Last Session: Client reports that she is sad this morning.  Client states that her dog passed away last night and she is very emotional.  Client presents to session this morning is tearful and sad.          Clinical Intervention(s): Reviewed progress,  psychoeducation, cognitive behavioral techniques, anxiety reduction techniques, solution focused techniques          Response to Intervention(s): Overall client says that her home life situation has been pretty stable.  Client says that she has not had contact with her son in quite a while and this continues to leave her somewhat anxious but less so since it has been so long.  Client and therapist talk about the fact that while this leaves her anxious wondering when the next contact will happen and what it will be like, there is also the question in client's mind of whether or not her son is beginning to disconnect from her.  Client says she feels guilty because there is a part of her that wishes this is the case.  Therapist validates client's feelings and normalizes them stating that she believes that is client's mind hoping for [place].  Client says she feels like there is a court tying her to her son that will not let her have release and sometimes she wishes it would just snap.  Therapist told client that this was nothing to feel guilty about and that at since age to expect the relationship to be more independent from 1 another.    Therapist and client reviewed client's homework from previous session in which she was to identify the various parts of her that needed to be nurtured and identify things she could do to nurture them.  Client has identified as 1 of those areas.  Since she last saw therapist client participated in a 50 K race out of town which she completed and afterwards she and her  took a 4-day vacation in [place], [place].  Therapist and client spent time reviewing the things that client contributed to that relationship went on identified as: Time, effort, commitment, energy, dedication and attention.  Client identified the things that running gave back to her as: Sense of accomplishment, time for herself, commitment to physical health, commitment to mental health, a sense of independence.   Therapist explained that this was an example of a healthy and reciprocal relationship that client can continue to nurture.  Client identified that the next 2 relationships that she wants to nurture are that with her  and that with her children.  Client began to identify ways that she can nurture her relationship with her 's: Spending time together, having conversations, supporting each other, and finding a joint activity that they both enjoy to do together.  Client began to identify ways that she can nurture her relationship with her other 2 children as: Having regular communication with him, possibly rockclimbing with her son, and planning a trip over a weekend with her daughter.  Client's homework for next session is to identify additional things that she can do to nurture these relationships and pay attention to the reciprocal nature of nurturing that is occurring within these relationships.          Goals Addressed:Help patient explore past emotional issues in relation to present anxiety, Assist patient in identifying behaviors that focus on relationship building and process the changes needed to improve relationships, Reinforce use of more realistic positive messages about to self in interpreting life events         Assessment & Plan client presents to session with a depressed demeanor as of tearfulness and verbalized sadness, this is in relation to the loss of her dog last night.  However client states that overall her anxiety has been down somewhat because she has had no communication with her son.  Client talks about how this is comforting yet at the same time concerning for her.  Client does verbalize feeling guilty about the fact that she has a desire that her son is simply moving on and will not reach out to her.  Therapist normalizes this feeling for client.  Client engages in a discussion about nurturing the various parts of her and talks about her relationship with running and how she  nurtured this relationship by recently participating in a 50 [employer] race.  Client would like to focus next on nurturing her relationships with her  and other children. Client's homework for next session is to identify additional things that she can do to nurture these relationships and pay attention to the reciprocal nature of nurturing that is occurring within these relationships.      Diagnoses and all orders for this visit:    1. Generalized anxiety disorder with panic attacks (Primary)    2. MDD (major depressive disorder), recurrent episode, moderate          Mental Status Exam:   Hygiene:    Unable to discern via telemedicine  Cooperation:  Cooperative  Eye Contact:  Good  Psychomotor Behavior:  Appropriate  Affect:   Anxious  Speech:  Normal and Rapid  Thought Progress:  Linear  Thought Content:  Mood congruent  Suicidal:  None  Homicidal:  None  Hallucinations:  None  Delusion:  None  Memory:  Intact  Orientation:  Person, Place, Time, and Situation  Reliability:  good  Insight:  Fair  Judgement:  Good  Impulse Control:  Good        Progress toward goal: Developments continue in the area of reducing anxiety, improving self-esteem. The ongoing treatment plan includes therapeutic work on reducing anxiety, improving relationships, improving self-esteem. Current outstanding therapeutic issues are: Improving relationship. Treatment to continue as indicated.     Functional Status: Fair       Prognosis: Good        Plan: Continue treatment as indicated     Return in about 3 weeks (around 11/9/2023) for Next scheduled follow up, Video visit.    [facility] No Show Policy:  We understand unexpected circumstances arise; however, anytime you miss your appointment we are unable to provide you appropriate care.  In addition, each appointment missed could have been used to provide care for others.  We ask that you call at least 24 hours in advance to cancel or reschedule an appointment.  We would  like to take this opportunity to remind you of our policy stating patients who miss THREE or more appointments without cancelling or rescheduling 24 hours in advance of the appointment may be subject to cancellation of any further visits with our clinic and recommendation to seek in-person services/visits.    Please call [phone] to reschedule your appointment. If there are reasons that make it difficult for you to keep the appointments, please call and let us know how we can help.  Please understand that medication prescribing will not continue without seeing your provider.      [facility]'s No Show Policy reviewed with patient at today's visit. Patient verbalized understanding of this policy. Discussed with patient that in the event that there are three or more no show visits, it will be recommended that they pursue in-person services/visits as noncompliance with telehealth visits indicates that patient is not an appropriate candidate for telemedicine and would likely be more appropriate for in-person services/visits. Patient verbalizes understanding and is agreeable to this.       This document signed by Sheila Faust LCSW electronically Sheila Faust LCSW  October 19, 2023 08:57 EDT

## 2023-10-19 ENCOUNTER — TELEMEDICINE (OUTPATIENT)
Dept: BEHAVIORAL HEALTH | Facility: CLINIC | Age: 53
End: 2023-10-19
Payer: COMMERCIAL

## 2023-10-19 ENCOUNTER — TELEPHONE (OUTPATIENT)
Dept: BEHAVIORAL HEALTH | Facility: CLINIC | Age: 53
End: 2023-10-19

## 2023-10-19 DIAGNOSIS — F41.0 GENERALIZED ANXIETY DISORDER WITH PANIC ATTACKS: Primary | ICD-10-CM

## 2023-10-19 DIAGNOSIS — F41.1 GENERALIZED ANXIETY DISORDER WITH PANIC ATTACKS: Primary | ICD-10-CM

## 2023-10-19 DIAGNOSIS — F33.1 MDD (MAJOR DEPRESSIVE DISORDER), RECURRENT EPISODE, MODERATE: ICD-10-CM

## 2023-10-19 NOTE — TELEPHONE ENCOUNTER
LVM for patient to return call to schedule follow-up appointment. Patient's last appointment was this morning via video visit on 10.19.23. Sheila requests for patient to follow up in 3 weeks.

## 2023-11-01 DIAGNOSIS — F41.0 GENERALIZED ANXIETY DISORDER WITH PANIC ATTACKS: ICD-10-CM

## 2023-11-01 DIAGNOSIS — F33.1 MDD (MAJOR DEPRESSIVE DISORDER), RECURRENT EPISODE, MODERATE: ICD-10-CM

## 2023-11-01 DIAGNOSIS — F41.1 GENERALIZED ANXIETY DISORDER WITH PANIC ATTACKS: ICD-10-CM

## 2023-11-02 RX ORDER — CITALOPRAM 20 MG/1
20 TABLET ORAL DAILY
Qty: 90 TABLET | Refills: 1 | Status: SHIPPED | OUTPATIENT
Start: 2023-11-02

## 2023-11-03 NOTE — PROGRESS NOTES
Access to MH/SA Psychotherapy Notes:  A licensed health care professional has determined, in the exercise of professional judgment, that the access requested is reasonably likely to endanger the life or physical safety of the individual or another person.  Date: November 7, 2023    This provider is located at the [facility]) [address] [place] [address] using a secure [employer] Video Visit through [employer]. Patient is being seen remotely via telehealth at home address in [place] and stated they are in a secure environment for this session. The patient's condition being diagnosed/treated is appropriate for telemedicine. The provider identified herself as well as her credentials. The patient, and/or patients guardian, consent to be seen remotely, and when consent is given they understand that the consent allows for patient identifiable information to be sent to a third party as needed. They may refuse to be seen remotely at any time. The electronic data is encrypted and password protected, and the patient and/or guardian has been advised of the potential risks to privacy not withstanding such measures.     You have chosen to receive care through a telehealth visit.  Do you consent to use a video/audio connection for your medical care today? Yes  Subjective   Date of Service: November 7, 2023  Length of Session: 45 minutes    Chief Complaint:, Anxiety    IDENTIFYING  INFORMATION:   Nicki Fowler is a 53 y.o. female who met 1:1 with the undersigned for a regularly scheduled individual outpatient therapy session at [address] [place] [address].     SI/SHB: None    Progress Since Last Session: Client reports that in terms of her anxiety and self-esteem things have been overall well since her last session.  Client does report that she has been somewhat emotional and had low motivation since her last session.  Client states that she has had  some trouble with sleep as well.  Client thinks these issues were not present before her last session and she and therapist agree that they could be related to having to put her dog to sleep.  Therapist suggested that client monitor these issues to see if they continue and let psychiatric nurse practitioner know at her next visit if they linger.          Clinical Intervention(s): Reviewed progress, reviewed treatment plan, psychoeducation, cognitive behavioral techniques, solution focused techniques          Response to Intervention(s): Therapist and client review homework from last session in terms of identifying additional relationships that she needs to nurture.  Client and therapist discussed that client's primary relationship at this point that she needs to focus on nurturing is the relationship the client has with herself.  Client and therapist talked about the conversations that she has had with her son recently and the fact that they have been without conflict.  Client says she feels confident in her ability to handle most interactions with her son it is the ones that are very conflictual in nature and potentially violent.  Therapist and client discussed and reviewed boundary setting as well as maintaining safety.  Therapist and client discussed meetings of codependents Anonymous and client admits not feeling as comfortable with the group she was attending.  Therapist and client discussed attending different meetings different groups as well as seeking out meetings through [employer], meetings with the [employer] [place] of mental illness, and meetings through depression and bipolar support group.  Client will follow through with those.    Therapist and client spent time reviewing client's progress.  In terms of treatment goals client wanted to reduce her anxiety and client currently rates her anxiety at a level of 2 out of 10 (which is reduced from her initial rating of a level of 6 out of 10).  Client also  admitted to improve her self-esteem which she currently rates at a level of 8 out of 10 (which is improved from a level of 1 out of 10 at her initial rating).  Client believes she has met her initial treatment goals.  Therapist discusses the fact that she will be leaving her current position and client and therapist are both in agreement that client has made enough progress that she no longer needs to continue therapy.  Client does understand that she can continue medication management through the psychiatric nurse practitioner.  Client also understands that she can resume therapy if at any point in the future she feels that it is needed.  Client and therapist are in agreement that this will be her last therapy session through [facility] for the time being but she can resume services on an as needed basis.          Goals Addressed:Patient will develop and implement behavioral and cognitive strategies to reduce anxiety and irrational fears, Patient will demonstrate the ability to internalize positive cognitive messages that is also reflected in behavioral changes,          Assessment & Plan client presented to session with a significant decrease in anxiety currently rating at a level of 2 out of 10 and an improvement in her self-esteem currently rating it at 11 of 8 out of 10.  Client is currently reporting some emotionality and trouble sleeping that she relates to a reaction to recent life events but will continue to monitor and share with psychiatric nurse practitioner at her next appointment should the symptoms linger.  Client feels confident in her knowledge about boundary setting and using it with her son and their interactions but does have some trepidation about her ability to set these boundaries in extremely strong situations.  Therapist and client talked about practicing this boundary setting with her son and we also discussed maintaining safety and potentially violent situations.  Client will  continue to seek out support through meetings with codependents anonymous, [employer] and or depression and bipolar support groups.  Client can also seek out support through [employer] for mental illness.  Client has met her treatment goals related to reducing anxiety and improve his self-esteem and at this point no longer needs to continue therapy services.  Client understands that she can seek out services again in the future if she feels they are necessary.  At this time client will discontinue therapy services as she has met her treatment goals.    Diagnoses and all orders for this visit:    1. Moderate episode of recurrent major depressive disorder (Primary)    2. Generalized anxiety disorder with panic attacks          Mental Status Exam:   Hygiene:   Unable to discern via telemedicine  Cooperation:  Cooperative  Eye Contact:  Good  Psychomotor Behavior:  Appropriate  Affect:  Appropriate  Speech:  Normal  Thought Progress:  Linear  Thought Content:  Mood congruent  Suicidal:  None  Homicidal:  None  Hallucinations:  None  Delusion:  None  Memory:  Intact  Orientation:  Person, Place, Time, and Situation  Reliability:  good  Insight:  Good  Judgement:  Good  Impulse Control:  Good        Progress toward goal: Developments continue in the area of reduce anxiety, improve self-esteem. The ongoing treatment plan includes therapeutic work on reduce anxiety, improve self-esteem. Current outstanding therapeutic issues are: None. Treatment to continue as indicated.     Functional Status: Good       Prognosis: Good        Plan: Discontinue services at this time due to meeting treatment goals     Return if symptoms worsen or fail to improve; treatment goals have been met.    [facility] No Show Policy:  We understand unexpected circumstances arise; however, anytime you miss your appointment we are unable to provide you appropriate care.  In addition, each appointment missed could have been  used to provide care for others.  We ask that you call at least 24 hours in advance to cancel or reschedule an appointment.  We would like to take this opportunity to remind you of our policy stating patients who miss THREE or more appointments without cancelling or rescheduling 24 hours in advance of the appointment may be subject to cancellation of any further visits with our clinic and recommendation to seek in-person services/visits.    Please call [phone] to reschedule your appointment. If there are reasons that make it difficult for you to keep the appointments, please call and let us know how we can help.  Please understand that medication prescribing will not continue without seeing your provider.      [facility]'s No Show Policy reviewed with patient at today's visit. Patient verbalized understanding of this policy. Discussed with patient that in the event that there are three or more no show visits, it will be recommended that they pursue in-person services/visits as noncompliance with telehealth visits indicates that patient is not an appropriate candidate for telemedicine and would likely be more appropriate for in-person services/visits. Patient verbalizes understanding and is agreeable to this.       This document signed by Sheila Faust LCSW electronically Sheila Faust LCSW  November 7, 2023 08:47 EST

## 2023-11-07 ENCOUNTER — TELEMEDICINE (OUTPATIENT)
Dept: BEHAVIORAL HEALTH | Facility: CLINIC | Age: 53
End: 2023-11-07
Payer: COMMERCIAL

## 2023-11-07 DIAGNOSIS — F41.1 GENERALIZED ANXIETY DISORDER WITH PANIC ATTACKS: ICD-10-CM

## 2023-11-07 DIAGNOSIS — F33.1 MODERATE EPISODE OF RECURRENT MAJOR DEPRESSIVE DISORDER: Primary | ICD-10-CM

## 2023-11-07 DIAGNOSIS — F41.0 GENERALIZED ANXIETY DISORDER WITH PANIC ATTACKS: ICD-10-CM

## 2023-11-22 NOTE — PROGRESS NOTES
Patient assessed today via MyChart through EPIC pt is at home in a secure environment and verbalizes privacy during interview. JAMES Brooks is at home in a secure environment using a secure laptop.The patient's condition being diagnosed/treated is appropriate for telemedicine.The provider identified himself as well as his credentials.The patient, and/or patient's guardian, consent to be seen remotely, and when consent is given they understand that the consent allows for patient identifiable information to be sent to a third party as needed. They may refuse to be seen remotely at any time. The electronic data is encrypted and password protected, and the patient and/or guardian has been advised of the potential risks to privacy not withstanding such measures.    [facility] PROGRESS NOTE  [facility] JULIAN [facility]  [address], [place] [address]    NAME: Nicki Fowler     : 1970   MRN: [id number]      DATE: 2023    ALLERGY:Cinnamon and Shellfish-derived products     MEDICATIONS:  Current Outpatient Medications   Medication Instructions    ALPRAZolam (XANAX) 0.25 mg, Oral, 2 Times Daily PRN    cetirizine (ZYRTEC) 10 mg, Oral, Daily    citalopram (CELEXA) 20 mg, Oral, Daily    FIBER PO Oral, Every Morning    nortriptyline (PAMELOR) 20 mg, Oral, Nightly    triamterene-hydrochlorothiazide (MAXZIDE-25) 37.5-25 MG per tablet       VITALS: There were no vitals taken for this visit. No LMP recorded. Patient is perimenopausal.     Subjective     Chief Complaint   Patient presents with    Anxiety    Depression    Follow-up     HPI:  53 y.o. female patient seen for follow up. Patient last seen 3 months ago, no med changes at that time.  Patient reports over the past few weeks/months things have not been good shortly after coming back from vacation she had to put her dog down, patient also reports has been going to the ER for kidney stones had surgery that resulted in a complication and a second emergency  procedure.  Patient also reports increased stress at work due to this being there busy season.  Patient and Sheila LCSPEEWEE have concluded psychotherapy due to Sheila leaving the practice, reports they both agreed she does not 100% need counseling at this time.  Patient uses Xanax very seldomly, 2-3 times since last visit 3 months ago, sleep disturbance reported, patient states she sleeps too much, does not want to get out of bed in the morning, wants to sleep earlier.  Patient reports they will be going to [place] in December that she is looking forward to, patient reports she is supposed to be training here soon for an upcoming marathon, has done this in the past but is lacking motivation.            Social Status:  Ethnic Group: Not  Or   Race: White Or   Marital Status:    Employment status: Full Time [employer]       SUBSTANCE/SEXUAL HISTORY:  Social History     Socioeconomic History    Marital status:      Spouse name: Bob    Number of children: 3    Years of education: BA    Highest education level: Bachelor's degree (e.g., BA, AB, BS)   Tobacco Use    Smoking status: Former     Packs/day: 2.00     Years: 20.00     Additional pack years: 0.00     Total pack years: 40.00     Types: Cigarettes     Start date: 1/1/1983     Quit date: 1/1/2008     Years since quitting: 15.9    Smokeless tobacco: Never    Tobacco comments:     stopped 5 years for pregnancies   Vaping Use    Vaping Use: Never used   Substance and Sexual Activity    Alcohol use: Yes     Alcohol/week: 2.0 standard drinks of alcohol     Types: 2 Glasses of wine per week     Comment: Social    Drug use: No    Sexual activity: Yes     Partners: Male     Birth control/protection: Vasectomy     Social History     Social History Narrative    Patient works for [employer], reports it is a Monday through Friday 9-5 type hours, highest level of education is a bachelor's degree, patient lives with  spouse whose name is Bob and youngest son Reji Guerrero.  Patient has a 26-year-old daughter named Nusrat, oldest son is Sher age 24.      FAMILY HISTORY:  family history includes Arthritis in her brother, brother, and mother; Asthma in her daughter; Atrial fibrillation in her mother; Cancer in her father and mother; Dementia in her paternal uncle; Heart attack in her father; Heart disease in her maternal aunt and mother; Hyperlipidemia in her brother and mother; Hypertension in her brother and mother; Stroke in her father; Thyroid disease in her mother.     PAST MEDICAL HISTORY   has a past medical history of Anxiety and depression, Arthritis, Cervical disc disorder, Closed fracture of coccyx (09/2013), Colon polyp (04/30/2018), Dizziness, Headache, History of seasonal allergies, Hyperlipidemia, IBS (irritable bowel syndrome), Lactose intolerance (early 20's), Low back pain, Lumbosacral disc disease, Meniere's disease, and Peripheral neuropathy.    She has no past medical history of AAA (abdominal aortic aneurysm), Abnormal ECG, Alcoholism, Anemia, Aneurysm, Arteriovenous malformation, Asthma, Simons esophagus, Bleeding disorder, Brain concussion, Cancer, Carotid artery occlusion, Celiac disease, CHF (congestive heart failure), Cholelithiasis, Chronic kidney disease, Cirrhosis, Claustrophobia, Clotting disorder, Colon cancer, COPD (chronic obstructive pulmonary disease), Coronary artery disease, Crohn's disease, CTS (carpal tunnel syndrome), Deep vein thrombosis, Diabetes mellitus, Disease of thyroid gland, Diverticulitis of colon, Esophageal varices, Fatty liver, GERD (gastroesophageal reflux disease), GI (gastrointestinal bleed), Gout, Hard to intubate, Head injury, Hernia, History of transfusion, HIV disease, Hypertension, Infectious viral hepatitis, Liver disease, Malignant hyperthermia due to anesthesia, MRSA (methicillin resistant Staphylococcus aureus), Myocardial infarction, Pancreatitis, PONV (postoperative  nausea and vomiting), Pulmonary arterial hypertension, Renal insufficiency, Seizures, Sickle cell anemia, Spinal headache, Stroke, Substance abuse, Thoracic disc disorder, TIA (transient ischemic attack), Ulcer, or Ulcerative colitis.     Review of Systems   Constitutional: Negative.  Positive for fatigue.   Respiratory:  Negative for chest tightness and shortness of breath.    Cardiovascular:  Negative for chest pain.   Gastrointestinal:  Negative for nausea and vomiting.   Neurological:  Negative for dizziness and light-headedness.   Psychiatric/Behavioral:  Positive for sleep disturbance, depressed mood and stress.        Objective   Physical Exam  Constitutional:       Appearance: Normal appearance.   HENT:      Head: Normocephalic.   Pulmonary:      Effort: Pulmonary effort is normal.   Skin:     General: Skin is dry.   Neurological:      Mental Status: He/She/They are alert and oriented to person, place, and time.     PHQ-9 Depression Screening  Little interest or pleasure in doing things? (P) 2-->more than half the days   Feeling down, depressed, or hopeless? (P) 1-->several days   Trouble falling or staying asleep, or sleeping too much? (P) 2-->more than half the days   Feeling tired or having little energy?     Poor appetite or overeating? (P) 1-->several days   Feeling bad about yourself/you are a failure/have let yourself or your family down? (P) 1-->several days   Trouble concentrating on things? (P) 1-->several days   Psychomotor agitation/retardation (P) 0-->not at all   Thoughts about death/dying/suicide (P) 0-->not at all   PHQ-9 Total Score (P) 9   How difficult have these problems for you? (P) very difficult     GAD7 Documentation:  Feeling nervous, anxious or on edge (P) 2   Not being able to stop or control worrying (P) 2   Worrying too much about different things (P) 1   Trouble relaxing (P) 2   Being so restless that it is hard to sit still (P) 2   Becoming easily annoyed or irritable (P) 1    Feeling Afraid as if something awful might happen (P) 1   EDITH Total Score (P) 11   How difficult have these problems made it for you? (P) Somewhat difficult     MENTAL STATUS EXAM   General Appearance:  Cleanly groomed and dressed  Eye Contact:  Good eye contact  Attitude:  Cooperative  Speech:  Normal rate, tone, volume  Mood and affect:  Normal, pleasant  Thought Process:  Goal-directed  Associations/ Thought Content:  No delusions  Hallucinations:  None  Suicidal Ideations:  Not present  Homicidal Ideation:  Not present  Orientation:  Person, place, time and situation  Fund of Knowledge:  Appropriate for age and educational level  Intellectual Functioning:  Average range  Insight:  Fair  Judgement:  Fair  Reliability:  Fair  Impulse Control:  Fair       LABS:  CBC          2/10/2023    10:45   CBC   WBC 5.0    RBC 4.30    Hemoglobin 12.9    Hematocrit 38.9    MCV 91    MCH 30.0    MCHC 33.2    RDW 11.8    Platelets 264       CMP          2/10/2023    10:45   CMP   Glucose 84    BUN 13    Creatinine 0.77    Sodium 143    Potassium 4.2    Chloride 102    Calcium 9.5    Total Protein 6.9    Albumin 4.6    Globulin 2.3    Total Bilirubin 0.5    Alkaline Phosphatase 92    AST (SGOT) 19    ALT (SGPT) 11    BUN/Creatinine Ratio 17         Assessment    ASSESSMENT/TREATMENT PLAN/INSTRUCTIONS/EDUCATION    (F33.1) Moderate episode of recurrent major depressive disorder    (F41.1,  F41.0) Generalized anxiety disorder with panic attacks     -Worsening depression AEB PHQ/EDITH scores, also influenced by situational factors including having to put dog down, poor health of  and resulting emergency surgery.  Patient educated on seasonal pattern of depression, light box therapy discussed, positive coping skills discussed including light/moderate exercise, training for a marathon, having a long-term goal/trip, will be going to [place] in December, offered to increase Celexa, patient declined, follow-up in 3 months.   Patient no longer seeing roma Sosa patient and Sheila agreed therapy is not 100% needed at this time.    AFTER VISIT SUMMARY INSTRUCTIONS:    Medication changes:   Continue celexam 20 mg/day, pamelor 10 mg/day (written by GI for IBS), xanax 0.25 prn as previously ordered. Uses xanax very seldomly (twice in the last 3 months) medication filled one time in the past 12 months.  Therapy recommendation:   Exercise: Increase light/moderate exercise as tolerated, a combination of cardiovascular and strength training has been shown to be most beneficial for mental health, start with small short-term goals, 3-4 days a week, 30 minutes a day, consistency is key.  Patient educated that baseline heart rate should increase by 10-20 beats during activity, for exercise to be considered moderate intensity, patient should begin to sweat within 10 to 12 minutes.   Mindfulness-based stress reduction strategies have been shown to be effective in improving overall mental health by effectively lowering stress/anxiety levels, hand out provided for pt to review.    -Please call the office at [phone] with any worsening of symptoms or onset of intolerable side effects, please ask to leave a message with Todd's medical assistant.  Please give my office up to 48 hours to respond to a patient call/question/refill request.  -Patient is agreeable to call 911 or go to the nearest ER should he/she/they have any thoughts of harm to self or others.  -Patient has been educated on providers schedule, contact information, and patient/provider expectations.   -Patient has been educated regarding multimodal approach with healthy nutrition, healthy sleep, regular physical activity, social activities, counseling, and medications.     Return in 3 months (on 2/27/2024) for NO MED CHANGES.    PSYCHOTHERAPY:  In/Start Time: 0755.  Out/Stop Time: 0811.  16 minutes of face to face direct patient care with patient was spent for supportive  psychotherapy including strengthening self awareness and insights, strengthening coping skills, counseling the patient regarding diagnoses, and utilizing cognitive behavioral therapy to assist the patient in recognizing more appropriate coping mechanisms which are proven effective in reducing the severity of frequency of symptoms.  This APRN assisted the patient in processing the patient's diagnoses, and also acknowledged and normalized the patient's thoughts, feelings, and concerns This APRN allowed the patient to freely discuss issues without interruption or judgment.      ADDITIONAL MONITORING:  -Narc Contract: 4/23  -CORBY Q 3M minimum scanned into EMR: 9/23  -PDMP via EMR interface reviewed during f/u mehul'ts and med refill requests  -UDS: random     -Patient has been educated on the risk versus benefit of being prescribed a controlled substance, patient has been educated on additional monitoring that is required including Corby reports, random urine drug screens, and pill counts.  Narcotics contract has been read/signed by patient and provider, renewed yearly.      Pain Management Panel           No data to display               There are no discontinued medications.      No orders of the defined types were placed in this encounter.    -Barriers: stress  -Strengths:  motivated  and positive attitude    -Progress toward goal: Not at goal  -Functional Status: Mild impairment   -Prognosis: Good with Ongoing Treatment        SUMMARY/DISCUSSION:  Pt past social/medical/family history reviewed/updated. ROS conducted, medications/allergies, reviewed, patient was screened today for depression/anxiety, PHQ/EDITH scores reviewed.  Most recent vitals/labs reviewed. Pt was given appropriate time to ask questions and concerns were addressed. A thorough discussion was had that included review of disease process, need for continued monitoring and additional treatment options including use of pharmacological and  non-pharmacological approaches to care, decisions were made and agreed upon by patient and provider. Discussed the risks, benefits, and potential side effects of the medications; patient ackowledged and verbally consented.     Part of this note may be an electronic transcription/translation of spoken language to printed text using the Dragon Dictation System. Some of the data in this electronic note has been brought forward from a previous encounter, any necessary changes have been made, it has been reviewed by this APRN, and it is accurate.    This document has been electronically signed by JAYDA Hays November 27, 2023 08:20 EST

## 2023-11-27 ENCOUNTER — TELEMEDICINE (OUTPATIENT)
Dept: BEHAVIORAL HEALTH | Facility: CLINIC | Age: 53
End: 2023-11-27
Payer: COMMERCIAL

## 2023-11-27 DIAGNOSIS — F41.0 GENERALIZED ANXIETY DISORDER WITH PANIC ATTACKS: ICD-10-CM

## 2023-11-27 DIAGNOSIS — F33.1 MODERATE EPISODE OF RECURRENT MAJOR DEPRESSIVE DISORDER: Primary | ICD-10-CM

## 2023-11-27 DIAGNOSIS — F41.1 GENERALIZED ANXIETY DISORDER WITH PANIC ATTACKS: ICD-10-CM

## 2023-11-27 PROCEDURE — 99214 OFFICE O/P EST MOD 30 MIN: CPT

## 2023-11-27 PROCEDURE — 90833 PSYTX W PT W E/M 30 MIN: CPT

## 2023-11-27 NOTE — PATIENT INSTRUCTIONS
Medication changes:   Continue celexam 20 mg/day, pamelor 10 mg/day (written by GI for IBS), xanax 0.25 prn as previously ordered. Uses xanax very seldomly (twice in the last 3 months) medication filled one time in the past 12 months.  Therapy recommendation:   Exercise: Increase light/moderate exercise as tolerated, a combination of cardiovascular and strength training has been shown to be most beneficial for mental health, start with small short-term goals, 3-4 days a week, 30 minutes a day, consistency is key.  Patient educated that baseline heart rate should increase by 10-20 beats during activity, for exercise to be considered moderate intensity, patient should begin to sweat within 10 to 12 minutes.   Mindfulness-based stress reduction strategies have been shown to be effective in improving overall mental health by effectively lowering stress/anxiety levels, hand out provided for pt to review.    GENERAL NEW PATIENT INSTRUCTIONS:  -The best way to get a hold of Todd is to call the office at [phone] and ask to leave a message with his medical assistant.  Patient's are not able to message their mental health provider directly via [employer], please give my office up to 48 hours to respond to a patient call/question/refill request.  -Please call 911 or go to the nearest ER if you begin to have thoughts of harming yourself or other people.  -Refill requests will be made during normal office hours, Monday-Friday 8:00-5:30.  Todd is out of the office on Wednesdays and weekends.  Follow-up appointments must be maintained in order for prescribing to continue.    -Tuesdays and Thursdays are Todd's in-office days, Mondays and Fridays are his telehealth days.  The decision to be seen virtually or in person is up to the discretion of the provider, not all behavioral health problems are appropriate for telehealth.    NO SHOW POLICY:  We understand unexpected circumstances arise; however, anytime you miss your appointment  we are unable to provide you appropriate care.  In addition, each appointment missed could have been used to provide care for others.  We ask that you call at least 24 hours in advance to cancel or reschedule an appointment. We would like to take this opportunity to remind you of our policy stating patients who miss THREE or more appointments without cancelling or rescheduling 24 hours in advance of the appointment may be subject to cancellation of any further visits with our clinic and recommendation to seek in-person services/visits. Please call [phone] to reschedule your appointment. If there are reasons that make it difficult for you to keep the appointments, please call and let us know how we can help. Please understand that medication prescribing will not continue without seeing your provider.       [facility]   [address] [place] [address]  P: [phone]  F: [phone]

## 2023-11-30 RX ORDER — NORTRIPTYLINE HYDROCHLORIDE 10 MG/1
20 CAPSULE ORAL NIGHTLY
Qty: 180 CAPSULE | Refills: 3 | Status: SHIPPED | OUTPATIENT
Start: 2023-11-30

## 2024-02-26 ENCOUNTER — TELEMEDICINE (OUTPATIENT)
Dept: BEHAVIORAL HEALTH | Facility: CLINIC | Age: 54
End: 2024-02-26
Payer: COMMERCIAL

## 2024-02-26 DIAGNOSIS — F41.0 GENERALIZED ANXIETY DISORDER WITH PANIC ATTACKS: ICD-10-CM

## 2024-02-26 DIAGNOSIS — Z79.899 HIGH RISK MEDICATION USE: ICD-10-CM

## 2024-02-26 DIAGNOSIS — F41.1 GENERALIZED ANXIETY DISORDER WITH PANIC ATTACKS: ICD-10-CM

## 2024-02-26 DIAGNOSIS — F33.1 MODERATE EPISODE OF RECURRENT MAJOR DEPRESSIVE DISORDER: Primary | ICD-10-CM

## 2024-02-26 PROCEDURE — 99214 OFFICE O/P EST MOD 30 MIN: CPT

## 2024-02-26 PROCEDURE — 90833 PSYTX W PT W E/M 30 MIN: CPT

## 2024-02-26 RX ORDER — BUPROPION HYDROCHLORIDE 150 MG/1
150 TABLET ORAL EVERY MORNING
Qty: 30 TABLET | Refills: 1 | Status: SHIPPED | OUTPATIENT
Start: 2024-02-26

## 2024-02-26 NOTE — PROGRESS NOTES
Patient assessed today via MyChart through [facility] pt is at home in a secure environment and verbalizes privacy during interview. JAMES Brooks is at home in a secure environment using a secure laptop.The patient's condition being diagnosed/treated is appropriate for telemedicine.The provider identified himself as well as his credentials.The patient, and/or patient's guardian, consent to be seen remotely, and when consent is given they understand that the consent allows for patient identifiable information to be sent to a third party as needed. They may refuse to be seen remotely at any time. The electronic data is encrypted and password protected, and the patient and/or guardian has been advised of the potential risks to privacy not withstanding such measures.    [facility] PROGRESS NOTE  [facility]  [address], [place] [address]    NAME: Nicki Fowler     : 1970   MRN: [id number]      DATE: 2024    ALLERGY:Cinnamon and Shellfish-derived products      VITALS: There were no vitals taken for this visit. No LMP recorded. Patient is perimenopausal.     Subjective     Chief Complaint   Patient presents with    Anxiety    Depression    Follow-up     HPI:  53 y.o. female patient seen for follow up. Patient last seen 3 months ago, no med changes at that time.  Patient reports the past 3 months have not been good, she does not know why feels like she is back at square 1, reports stress at work, works for a [employer] company states work should not be stressful but it is they have lost a staff member and she is having to  the slack, patient reports she has been picking on her fingers more, IBS is up and down, GI MD writes her amitriptyline that she takes at night, patient not seeing a therapist due to Sheila FRAUSTO leaving the practice, interested in referral today, patient likes to run and train for a [employer], reports it has been a struggle has a run scheduled in March that  she is not 100% ready for, sleep is up-and-down at times, target symptoms of depression appear to be fatigue, no motivation, no energy, brain fog, educated on benefits of adding Wellbutrin temporarily and potentially going back to prior meds in the spring, patient reports she does not use Xanax once since I saw her last, patient denies new medications or new medical problems.            Social Status:  Ethnic Group: Not  Or   Race: White Or   Marital Status:    Employment status: Full Time [employer]       SUBSTANCE/SEXUAL HISTORY:  Social History     Socioeconomic History    Marital status:      Spouse name: Bob    Number of children: 3    Years of education: BA    Highest education level: Bachelor's degree (e.g., BA, AB, BS)   Tobacco Use    Smoking status: Former     Packs/day: 2.00     Years: 20.00     Additional pack years: 0.00     Total pack years: 40.00     Types: Cigarettes     Start date: 1983     Quit date: 2008     Years since quittin.1    Smokeless tobacco: Never    Tobacco comments:     stopped 5 years for pregnancies   Vaping Use    Vaping Use: Never used   Substance and Sexual Activity    Alcohol use: Yes     Alcohol/week: 2.0 standard drinks of alcohol     Types: 2 Glasses of wine per week     Comment: Social    Drug use: No    Sexual activity: Yes     Partners: Male     Birth control/protection: Vasectomy     Social History     Social History Narrative    Patient works for [employer], reports it is a Monday through Friday 9-5 type hours, highest level of education is a bachelor's degree, patient lives with spouse whose name is Bob and youngest son Reji Guerrero.  Patient has a 26-year-old daughter named Nusrat, oldest son is Sher age 24.      FAMILY HISTORY:  family history includes Arthritis in her brother, brother, and mother; Asthma in her daughter; Atrial fibrillation in her mother; Cancer in her father and mother;  Dementia in her paternal uncle; Heart attack in her father; Heart disease in her maternal aunt and mother; Hyperlipidemia in her brother and mother; Hypertension in her brother and mother; Stroke in her father; Thyroid disease in her mother.     PAST MEDICAL HISTORY   has a past medical history of Anxiety and depression, Arthritis, Cervical disc disorder, Closed fracture of coccyx (09/2013), Colon polyp (04/30/2018), Dizziness, Headache, History of seasonal allergies, Hyperlipidemia, IBS (irritable bowel syndrome), Lactose intolerance (early 20's), Low back pain, Lumbosacral disc disease, Meniere's disease, and Peripheral neuropathy.    She has no past medical history of AAA (abdominal aortic aneurysm), Abnormal ECG, Alcoholism, Anemia, Aneurysm, Arteriovenous malformation, Asthma, Simons esophagus, Bleeding disorder, Brain concussion, Cancer, Carotid artery occlusion, Celiac disease, CHF (congestive heart failure), Cholelithiasis, Chronic kidney disease, Cirrhosis, Claustrophobia, Clotting disorder, Colon cancer, COPD (chronic obstructive pulmonary disease), Coronary artery disease, Crohn's disease, CTS (carpal tunnel syndrome), Deep vein thrombosis, Diabetes mellitus, Disease of thyroid gland, Diverticulitis of colon, Esophageal varices, Fatty liver, GERD (gastroesophageal reflux disease), GI (gastrointestinal bleed), Gout, Hard to intubate, Head injury, Hernia, History of transfusion, HIV disease, Hypertension, Infectious viral hepatitis, Liver disease, Malignant hyperthermia due to anesthesia, MRSA (methicillin resistant Staphylococcus aureus), Myocardial infarction, Pancreatitis, PONV (postoperative nausea and vomiting), Pulmonary arterial hypertension, Renal insufficiency, Seizures, Sickle cell anemia, Spinal headache, Stroke, Substance abuse, Thoracic disc disorder, TIA (transient ischemic attack), Ulcer, or Ulcerative colitis.     Review of Systems   Constitutional: Negative.  Positive for activity change  and fatigue.   Respiratory:  Negative for chest tightness and shortness of breath.    Cardiovascular:  Negative for chest pain.   Gastrointestinal:  Positive for constipation and indigestion. Negative for vomiting.   Neurological:  Negative for dizziness and light-headedness.   Psychiatric/Behavioral:  Positive for decreased concentration, sleep disturbance, depressed mood and stress. Negative for suicidal ideas.      Objective   Physical Exam  Constitutional:       Appearance: Normal appearance.   HENT:      Head: Normocephalic.   Pulmonary:      Effort: Pulmonary effort is normal.   Skin:     General: Skin is dry.   Neurological:      Mental Status: He/She/They are alert and oriented to person, place, and time.     PHQ-9 Depression Screening  Little interest or pleasure in doing things? (P) 3-->nearly every day   Feeling down, depressed, or hopeless? (P) 3-->nearly every day   Trouble falling or staying asleep, or sleeping too much? (P) 3-->nearly every day   Feeling tired or having little energy?     Poor appetite or overeating? (P) 3-->nearly every day   Feeling bad about yourself/you are a failure/have let yourself or your family down? (P) 2-->more than half the days   Trouble concentrating on things? (P) 1-->several days   Psychomotor agitation/retardation (P) 1-->several days   Thoughts about death/dying/suicide (P) 1-->several days   PHQ-9 Total Score (P) 20   How difficult have these problems for you? (P) extremely difficult     GAD7 Documentation:  Feeling nervous, anxious or on edge (P) 3   Not being able to stop or control worrying (P) 2   Worrying too much about different things (P) 2   Trouble relaxing (P) 3   Being so restless that it is hard to sit still (P) 1   Becoming easily annoyed or irritable (P) 1   Feeling Afraid as if something awful might happen (P) 2   EDITH Total Score (P) 14   How difficult have these problems made it for you? (P) Very difficult     MENTAL STATUS EXAM:   General  Appearance:  Cleanly groomed and dressed  Eye Contact:  Good eye contact  Attitude:  Cooperative  Motor Activity:  Normal posture  Speech:  Normal rate, tone, volume  Mood and affect:  Normal, pleasant  Thought Process:  Goal-directed  Associations/ Thought Content:  No delusions  Hallucinations:  None  Suicidal Ideations:  Not present  Homicidal Ideation:  Not present  Sensorium:  Alert  Orientation:  Person, place, time and situation  Attention Span/ Concentration:  Good  Fund of Knowledge:  Appropriate for age and educational level  Intellectual Functioning:  Average range  Insight:  Fair  Judgement:  Fair  Reliability:  Fair     LABS:            Current Outpatient Medications   Medication Instructions    ALPRAZolam (XANAX) 0.25 mg, Oral, 2 Times Daily PRN    buPROPion XL (WELLBUTRIN XL) 150 mg, Oral, Every Morning    cetirizine (ZYRTEC) 10 mg, Oral, Daily    citalopram (CELEXA) 20 mg, Oral, Daily    FIBER PO Oral, Every Morning    nortriptyline (PAMELOR) 20 mg, Oral, Nightly    triamterene-hydrochlorothiazide (MAXZIDE-25) 37.5-25 MG per tablet      Assessment    ASSESSMENT/TREATMENT PLAN/INSTRUCTIONS/EDUCATION    (F33.1) Moderate episode of recurrent major depressive disorder - Plan: Ambulatory Referral to Behavioral Health, buPROPion XL (Wellbutrin XL) 150 MG 24 hr tablet    (F41.1,  F41.0) Generalized anxiety disorder with panic attacks - Plan: Ambulatory Referral to Behavioral Health, buPROPion XL (Wellbutrin XL) 150 MG 24 hr tablet    (Z79.899) High risk medication use     Depression, worsening, add Wellbutrin to Celexa  Anxiety/panic, stable  High risk medication use, unable to review CORBY via PMDP interface, not using Xanax currently  Increase exercise/positive coping skills  Refer to [facility] for counseling  F/U 6M    AFTER VISIT SUMMARY INSTRUCTIONS:    Medication changes:   Continue celexam 20 mg/day, pamelor 10 mg/day (written by GI for IBS), xanax 0.25 prn as previously ordered.    Wellbutrin/bupropion  mg, take first thing in the morning, must be on for a total of 4 to 6 weeks to get full benefit, can be used temporarily for seasonal pattern or continue longer if needed.  Therapy recommendation:   Exercise: Increase light/moderate exercise as tolerated, a combination of cardiovascular and strength training has been shown to be most beneficial for mental health, start with small short-term goals, 3-4 days a week, 30 minutes a day, consistency is key.  Patient educated that baseline heart rate should increase by 10-20 beats during activity, for exercise to be considered moderate intensity, patient should begin to sweat within 10 to 12 minutes.   Referral to [facility] for CBT/cognitive behavioral therapy  Increase positive coping skills discussed today including exercise/hobbies/training/trips    -Please call the office at [phone] with any worsening of symptoms or onset of intolerable side effects, please ask to leave a message with Todd's medical assistant.  Please give my office up to 48 hours to respond to a patient call/question/refill request.  -Patient is agreeable to call 911 or go to the nearest ER should he/she/they have any thoughts of harm to self or others.  -Patient has been educated on providers schedule, contact information, and patient/provider expectations.   -Patient has been educated regarding multimodal approach with healthy nutrition, healthy sleep, regular physical activity, social activities, counseling, and medications.     Return in about 6 weeks (around 4/8/2024) for Video visit.    PSYCHOTHERAPY:  In/Start Time: 0800.  Out/Stop Time: 0816. 16 minutes of face to face direct patient care with patient was spent for supportive psychotherapy including strengthening self awareness and insights, strengthening coping skills, counseling the patient regarding diagnoses, and utilizing cognitive behavioral therapy to assist the patient in recognizing more  appropriate coping mechanisms which are proven effective in reducing the severity of frequency of symptoms.  This APRN assisted the patient in processing the patient's diagnoses, and also acknowledged and normalized the patient's thoughts, feelings, and concerns This APRN allowed the patient to freely discuss issues without interruption or judgment.      ADDITIONAL MONITORING:  -Narc Contract: 4/23  -CORBY Q 3M minimum scanned into EMR: 12/23  -PDMP via EMR interface reviewed during f/u mehul'ts and med refill requests  -UDS: random     -Patient has been educated on the risk versus benefit of being prescribed a controlled substance, patient has been educated on additional monitoring that is required including Corby reports, random urine drug screens, and pill counts.  Narcotics contract has been read/signed by patient and provider, renewed yearly.      There are no discontinued medications.  New Medications Ordered This Visit   Medications    buPROPion XL (Wellbutrin XL) 150 MG 24 hr tablet     Sig: Take 1 tablet by mouth Every Morning.     Dispense:  30 tablet     Refill:  1      Orders Placed This Encounter   Procedures    Ambulatory Referral to Behavioral Health     Referral Priority:   Routine     Referral Type:   Behavorial Health/Psych     Referral Reason:   Specialty Services Required     Requested Specialty:   Behavioral Health     Number of Visits Requested:   1     -Barriers: stress  -Strengths:  motivated  and positive attitude    -Short-Term Goals: Pt will be compliant with medication management and note improvement in S/S over the next 4 to 6 weeks or at next scheduled visit.  -Long-Term Goals: Pt will be compliant with the agreed treatment plan including medication regimen & F/U appt's and deny impairment in daily functioning over the next 6 months.      -Progress toward goal: Not at goal  -Functional Status: Moderate impairment   -Prognosis: Good with Ongoing Treatment        SUMMARY/DISCUSSION:  Pt  past social/medical/family history reviewed/updated. ROS conducted, medications/allergies, reviewed, patient was screened today for depression/anxiety, PHQ/EDITH scores reviewed.  Most recent vitals/labs reviewed. Pt was given appropriate time to ask questions and concerns were addressed. A thorough discussion was had that included review of disease process, need for continued monitoring and additional treatment options including use of pharmacological and non-pharmacological approaches to care, decisions were made and agreed upon by patient and provider. Discussed the risks, benefits, and potential side effects of the medications; patient ackowledged and verbally consented.     Part of this note may be an electronic transcription/translation of spoken language to printed text using the Dragon Dictation System. Some of the data in this electronic note has been brought forward from a previous encounter, any necessary changes have been made, it has been reviewed by this APRN, and it is accurate.    This document has been electronically signed by JAYDA Hays February 26, 2024 08:25 EST

## 2024-02-26 NOTE — PATIENT INSTRUCTIONS
Medication changes:   Continue celexam 20 mg/day, pamelor 10 mg/day (written by GI for IBS), xanax 0.25 prn as previously ordered.   Wellbutrin/bupropion  mg, take first thing in the morning, must be on for a total of 4 to 6 weeks to get full benefit, can be used temporarily for seasonal pattern or continue longer if needed.  Therapy recommendation:   Exercise: Increase light/moderate exercise as tolerated, a combination of cardiovascular and strength training has been shown to be most beneficial for mental health, start with small short-term goals, 3-4 days a week, 30 minutes a day, consistency is key.  Patient educated that baseline heart rate should increase by 10-20 beats during activity, for exercise to be considered moderate intensity, patient should begin to sweat within 10 to 12 minutes.   Referral to [facility] for CBT/cognitive behavioral therapy  Increase positive coping skills discussed today including exercise/hobbies/training/trips    GENERAL NEW PATIENT INSTRUCTIONS:    -Please arrive in person or virtually 10-15 minutes prior to appointment to allow for registration/sign in/questionnaires. If you are seen virtually please review after visit summary (AVS)/patient instructions via [employer] for a summary of plan of care/changes in treatment plan. If you are having difficulties logging on or accessing [employer] please contact my office for assistance.    -The best way to get a hold of Todd is to call the office at [phone] and ask to leave a message with his medical assistant. Todd Burgess is a Psychiatric Mental Health Nurse Practitioner, due to his specialty patient's are not able to message him directly via [employer]. Please give my office up to 48 hours to respond to a patient call/question/refill request. Refill requests will be made during normal office hours only, Monday-Friday 8:00-5:30.      -Todd is out of the office on Wednesdays and weekends. Tuesdays and Thursdays are  Todd's in-office days, Mondays and Fridays are his telehealth days.  The decision to be seen virtually or in person is up to the discretion of the provider, not all behavioral health problems are appropriate for telehealth.    -Please call the office at [phone] with any worsening of symptoms or onset of intolerable side effects.  -Follow-up appointments must be maintained in order for prescribing to continue.  -Patient has been educated regarding multimodal approach with healthy nutrition, healthy sleep, regular physical activity, social activities, counseling, and medications.   -Please call 911 or go to the nearest ER if you begin to have thoughts of harming yourself or other people.

## 2024-03-06 ENCOUNTER — TELEMEDICINE (OUTPATIENT)
Dept: PSYCHIATRY | Facility: CLINIC | Age: 54
End: 2024-03-06
Payer: COMMERCIAL

## 2024-03-06 DIAGNOSIS — F33.1 MAJOR DEPRESSIVE DISORDER, RECURRENT EPISODE, MODERATE: Primary | ICD-10-CM

## 2024-03-06 DIAGNOSIS — F41.1 GENERALIZED ANXIETY DISORDER: ICD-10-CM
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
## 2024-03-29 ENCOUNTER — OFFICE VISIT (OUTPATIENT)
Dept: FAMILY MEDICINE CLINIC | Facility: CLINIC | Age: 54
End: 2024-03-29
Payer: COMMERCIAL

## 2024-03-29 VITALS
BODY MASS INDEX: 24.55 KG/M2 | HEIGHT: 62 IN | DIASTOLIC BLOOD PRESSURE: 78 MMHG | SYSTOLIC BLOOD PRESSURE: 130 MMHG | HEART RATE: 83 BPM | OXYGEN SATURATION: 97 % | WEIGHT: 133.4 LBS

## 2024-03-29 DIAGNOSIS — Z13.220 LIPID SCREENING: ICD-10-CM

## 2024-03-29 DIAGNOSIS — R12 HEARTBURN: ICD-10-CM

## 2024-03-29 DIAGNOSIS — K58.0 IRRITABLE BOWEL SYNDROME WITH DIARRHEA: ICD-10-CM

## 2024-03-29 DIAGNOSIS — R53.83 FATIGUE, UNSPECIFIED TYPE: ICD-10-CM

## 2024-03-29 DIAGNOSIS — Z00.00 WELL ADULT EXAM: Primary | ICD-10-CM

## 2024-03-29 DIAGNOSIS — F41.1 GAD (GENERALIZED ANXIETY DISORDER): ICD-10-CM

## 2024-03-29 RX ORDER — PANTOPRAZOLE SODIUM 40 MG/1
40 TABLET, DELAYED RELEASE ORAL DAILY
Qty: 30 TABLET | Refills: 1 | Status: SHIPPED | OUTPATIENT
Start: 2024-03-29

## 2024-03-29 NOTE — PROGRESS NOTES
Chief Complaint  Annual Exam    Subjective    History of Present Illness {CC  Problem List  Visit  Diagnosis   Encounters  Notes  Medications  Labs  Result Review Imaging  Media :23}     Nicki Fowler presents to [facility] for Annual Exam.  She is  and 3 children ages 23, 25, and 27. 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 She sees the gynecologist for her pap smear and mammogram. It has been a little over a year since her last gynecological visit. Her recent pap smear and mammogram have been normal. Her last colonoscopy was 2023 and she had polyp removed. She was told to recheck in 5 years. She is a former smoker. She smoked for about 20 years and quit in  (16 years ago). She exercises on average 5 days a week, primarily aerobic with a little strength and stretching. She is up to date on routine dental and eye exams.     Their chronic medical conditions were reviewed and are stable unless noted otherwise below. She has a history of menieres and is on triamterene/HCTZ through her ENT for that. She also has a history of depression and anxiety and is seeing psych nurse practitioner for this. She is stable on wellbutrin and celexa. She also takes low dose pamelor for her IBS.     She complains of increased heartburn the past 3-4 weeks. She denies any diet changes. She has been using tums as needed but it doesn't fully help. She denies any dysphagia or unexplained weight loss.     History of Present Illness     Social History     Socioeconomic History    Marital status:      Spouse name: Bob    Number of children: 3    Years of education: BA    Highest education level: Bachelor's degree (e.g., BA, AB, BS)   Tobacco Use    Smoking status: Former     Current packs/day: 0.00     Average packs/day: 2.2 packs/day for 33.3 years (74.9 ttl pk-yrs)     Types: Cigarettes     Start date: 1983     Quit date: 2008     Years since quittin.2    Smokeless  "tobacco: Never    Tobacco comments:     stopped 5 years for pregnancies   Vaping Use    Vaping status: Never Used   Substance and Sexual Activity    Alcohol use: Not Currently     Alcohol/week: 2.0 standard drinks of alcohol     Comment: Social    Drug use: No    Sexual activity: Yes     Partners: Male     Birth control/protection: Vasectomy        Review of Systems   Constitutional:  Positive for fatigue (chronic, mild).   HENT:  Negative for ear pain and sinus pain.    Eyes:  Negative for pain.   Respiratory:  Negative for cough and shortness of breath.    Cardiovascular:  Negative for chest pain and palpitations.   Gastrointestinal:  Negative for abdominal pain, constipation and diarrhea.        Heartburn, takes tums as needed   Genitourinary:  Negative for dysuria.   Musculoskeletal:  Positive for back pain (chronic, mild upper back pain, h/o DDD).   Skin:  Negative for color change.   Neurological:  Positive for dizziness and headaches (intermittent, no recent increase).   Psychiatric/Behavioral:  Positive for dysphoric mood. Negative for suicidal ideas. The patient is nervous/anxious (stable on current medications).         Objective       Vital Signs:   /78   Pulse 83   Ht 157.5 cm (62\")   Wt 60.5 kg (133 lb 6.4 oz)   SpO2 97%   BMI 24.40 kg/m²     Body mass index is 24.4 kg/m².     PHQ-9 Depression Screening  Little interest or pleasure in doing things? 0-->not at all   Feeling down, depressed, or hopeless? 0-->not at all   Trouble falling or staying asleep, or sleeping too much?     Feeling tired or having little energy?     Poor appetite or overeating?     Feeling bad about yourself - or that you are a failure or have let yourself or your family down?     Trouble concentrating on things, such as reading the newspaper or watching television?     Moving or speaking so slowly that other people could have noticed? Or the opposite - being so fidgety or restless that you have been moving around a lot " more than usual?     Thoughts that you would be better off dead, or of hurting yourself in some way?     PHQ-9 Total Score 0   If you checked off any problems, how difficult have these problems made it for you to do your work, take care of things at home, or get along with other people?           Physical Exam  Constitutional:       General: She is not in acute distress.  HENT:      Head: Normocephalic and atraumatic.      Right Ear: Tympanic membrane normal.      Left Ear: Tympanic membrane normal.      Nose: No rhinorrhea.      Mouth/Throat:      Mouth: Mucous membranes are moist.      Pharynx: No posterior oropharyngeal erythema.   Eyes:      Extraocular Movements: Extraocular movements intact.      Conjunctiva/sclera: Conjunctivae normal.   Cardiovascular:      Rate and Rhythm: Normal rate and regular rhythm.      Pulses: Normal pulses.      Heart sounds: No murmur heard.  Pulmonary:      Effort: No respiratory distress.      Breath sounds: Normal breath sounds.   Abdominal:      General: There is no distension.      Palpations: Abdomen is soft.      Tenderness: There is no abdominal tenderness.   Musculoskeletal:      Right lower leg: No edema.      Left lower leg: No edema.   Lymphadenopathy:      Cervical: No cervical adenopathy.   Skin:     General: Skin is warm.   Neurological:      General: No focal deficit present.      Mental Status: She is alert.   Psychiatric:         Behavior: Behavior normal.          Result Review  Data Reviewed:{ Labs  Result Review  Imaging  Med Tab  Media :23}                Assessment and Plan {CC Problem List  Visit Diagnosis  ROS  Review (Popup)  Health Maintenance  Quality  BestPractice  Medications  SmartSets  SnapShot Encounters  Media :23}   Diagnoses and all orders for this visit:    1. Well adult exam (Primary)  -     CBC & Differential  -     Comprehensive Metabolic Panel  -     Lipid Panel  -     TSH Rfx On Abnormal To Free T4    2. Fatigue, unspecified  type  -     CBC & Differential  -     Comprehensive Metabolic Panel  -     TSH Rfx On Abnormal To Free T4  -     Vitamin B12    3. Heartburn  -     pantoprazole (PROTONIX) 40 MG EC tablet; Take 1 tablet by mouth Daily.  Dispense: 30 tablet; Refill: 1    4. EDITH (generalized anxiety disorder)  -     TSH Rfx On Abnormal To Free T4    5. Irritable bowel syndrome with diarrhea    6. Lipid screening  -     Lipid Panel        Patient Instructions   Continue your current medications.   Aim for 150 minutes of aerobic exercise weekly.  You had lab tests today. You should receive a call or my chart message with your test results. If you have not received your results in the next 7-10 days, please contact the office.  See gynecologist as planned for mammogram and pap smear.  Colonoscopy is up to date. Next due 7/2028.  Add pantoprazole for reflux for 4-6 weeks. If symptoms persist or recur, plan GI for upper endoscopy.       Routine health maintenance, immunizations, and cancer screenings were discussed and encouraged.     Patient was given instructions and counseling regarding her condition or for health maintenance advice on the AVS.       No follow-ups on file.    Elizabeth West MD

## 2024-03-29 NOTE — PATIENT INSTRUCTIONS
Continue your current medications.   Aim for 150 minutes of aerobic exercise weekly.  You had lab tests today. You should receive a call or my chart message with your test results. If you have not received your results in the next 7-10 days, please contact the office.  See gynecologist as planned for mammogram and pap smear.  Colonoscopy is up to date. Next due 7/2028.  Add pantoprazole for reflux for 4-6 weeks. If symptoms persist or recur, plan GI for upper endoscopy.

## 2024-03-30 LAB
ALBUMIN SERPL-MCNC: 4.4 G/DL (ref 3.5–5.2)
ALBUMIN/GLOB SERPL: 1.8 G/DL
ALP SERPL-CCNC: 96 U/L (ref 39–117)
ALT SERPL-CCNC: 16 U/L (ref 1–33)
AST SERPL-CCNC: 28 U/L (ref 1–32)
BASOPHILS # BLD AUTO: 0.03 10*3/MM3 (ref 0–0.2)
BASOPHILS NFR BLD AUTO: 0.8 % (ref 0–1.5)
BILIRUB SERPL-MCNC: 0.4 MG/DL (ref 0–1.2)
BUN SERPL-MCNC: 11 MG/DL (ref 6–20)
BUN/CREAT SERPL: 13.8 (ref 7–25)
CALCIUM SERPL-MCNC: 9.4 MG/DL (ref 8.6–10.5)
CHLORIDE SERPL-SCNC: 100 MMOL/L (ref 98–107)
CHOLEST SERPL-MCNC: 230 MG/DL (ref 0–200)
CO2 SERPL-SCNC: 31.2 MMOL/L (ref 22–29)
CREAT SERPL-MCNC: 0.8 MG/DL (ref 0.57–1)
EGFRCR SERPLBLD CKD-EPI 2021: 88.8 ML/MIN/1.73
EOSINOPHIL # BLD AUTO: 0.07 10*3/MM3 (ref 0–0.4)
EOSINOPHIL NFR BLD AUTO: 1.8 % (ref 0.3–6.2)
ERYTHROCYTE [DISTWIDTH] IN BLOOD BY AUTOMATED COUNT: 12.5 % (ref 12.3–15.4)
GLOBULIN SER CALC-MCNC: 2.4 GM/DL
GLUCOSE SERPL-MCNC: 80 MG/DL (ref 65–99)
HCT VFR BLD AUTO: 40.2 % (ref 34–46.6)
HDLC SERPL-MCNC: 77 MG/DL (ref 40–60)
HGB BLD-MCNC: 12.9 G/DL (ref 12–15.9)
IMM GRANULOCYTES # BLD AUTO: 0.01 10*3/MM3 (ref 0–0.05)
IMM GRANULOCYTES NFR BLD AUTO: 0.3 % (ref 0–0.5)
LDLC SERPL CALC-MCNC: 138 MG/DL (ref 0–100)
LYMPHOCYTES # BLD AUTO: 1 10*3/MM3 (ref 0.7–3.1)
LYMPHOCYTES NFR BLD AUTO: 26 % (ref 19.6–45.3)
MCH RBC QN AUTO: 30 PG (ref 26.6–33)
MCHC RBC AUTO-ENTMCNC: 32.1 G/DL (ref 31.5–35.7)
MCV RBC AUTO: 93.5 FL (ref 79–97)
MONOCYTES # BLD AUTO: 0.35 10*3/MM3 (ref 0.1–0.9)
MONOCYTES NFR BLD AUTO: 9.1 % (ref 5–12)
NEUTROPHILS # BLD AUTO: 2.39 10*3/MM3 (ref 1.7–7)
NEUTROPHILS NFR BLD AUTO: 62 % (ref 42.7–76)
NRBC BLD AUTO-RTO: 0 /100 WBC (ref 0–0.2)
PLATELET # BLD AUTO: 255 10*3/MM3 (ref 140–450)
POTASSIUM SERPL-SCNC: 3.8 MMOL/L (ref 3.5–5.2)
PROT SERPL-MCNC: 6.8 G/DL (ref 6–8.5)
RBC # BLD AUTO: 4.3 10*6/MM3 (ref 3.77–5.28)
SODIUM SERPL-SCNC: 139 MMOL/L (ref 136–145)
TRIGL SERPL-MCNC: 87 MG/DL (ref 0–150)
TSH SERPL DL<=0.005 MIU/L-ACNC: 0.92 UIU/ML (ref 0.27–4.2)
VIT B12 SERPL-MCNC: 584 PG/ML (ref 211–946)
VLDLC SERPL CALC-MCNC: 15 MG/DL (ref 5–40)
WBC # BLD AUTO: 3.85 10*3/MM3 (ref 3.4–10.8)

## 2024-03-30 NOTE — PROGRESS NOTES
Hello!    Here are the results of your most recent labs:    Your vitamin B12, CBC, Comprehensive Metabolic Panel, and TSH was all normal.     Your cholesterol was elevated.  For diet and lifestyle intervention, I recommend decreasing intake of trans and saturated fats, and red meat.  Increase physical activity as tolerated.  You may try supplementing with omega-3 1-2g daily, berberine 500mg daily, and/or whole flaxseed if desired.    ASCVD risk: The 10-year ASCVD risk score (Taryn LEVY, et al., 2019) is: 1.2%    Values used to calculate the score:      Age: 53 years      Sex: Female      Is Non- : No      Diabetic: No      Tobacco smoker: No      Systolic Blood Pressure: 130 mmHg      Is BP treated: No      HDL Cholesterol: 77 mg/dL      Total Cholesterol: 230 mg/dL      At this time a statin is not recommended.     Please continue your current medications.  Please contact me with any questions.    Thank you!  Dr. Mendes

## 2024-04-03 ENCOUNTER — TELEMEDICINE (OUTPATIENT)
Dept: PSYCHIATRY | Facility: CLINIC | Age: 54
End: 2024-04-03
Payer: COMMERCIAL

## 2024-04-03 DIAGNOSIS — F41.1 GENERALIZED ANXIETY DISORDER: ICD-10-CM

## 2024-04-03 DIAGNOSIS — F33.1 MAJOR DEPRESSIVE DISORDER, RECURRENT EPISODE, MODERATE: Primary | ICD-10-CM
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
## 2024-04-08 ENCOUNTER — TELEMEDICINE (OUTPATIENT)
Dept: BEHAVIORAL HEALTH | Facility: CLINIC | Age: 54
End: 2024-04-08
Payer: COMMERCIAL

## 2024-04-08 DIAGNOSIS — Z79.899 HIGH RISK MEDICATION USE: ICD-10-CM

## 2024-04-08 DIAGNOSIS — F41.0 GENERALIZED ANXIETY DISORDER WITH PANIC ATTACKS: ICD-10-CM

## 2024-04-08 DIAGNOSIS — F41.1 GENERALIZED ANXIETY DISORDER WITH PANIC ATTACKS: ICD-10-CM

## 2024-04-08 DIAGNOSIS — F33.1 MODERATE EPISODE OF RECURRENT MAJOR DEPRESSIVE DISORDER: Primary | ICD-10-CM

## 2024-04-08 RX ORDER — BUPROPION HYDROCHLORIDE 150 MG/1
150 TABLET ORAL EVERY MORNING
Qty: 90 TABLET | Refills: 1 | Status: SHIPPED | OUTPATIENT
Start: 2024-04-08

## 2024-04-08 RX ORDER — CITALOPRAM 20 MG/1
20 TABLET ORAL DAILY
Qty: 90 TABLET | Refills: 1 | Status: SHIPPED | OUTPATIENT
Start: 2024-04-08
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
## 2024-04-08 NOTE — PATIENT INSTRUCTIONS
GENERAL NEW PATIENT INSTRUCTIONS:    -Please arrive in person or virtually 10-15 minutes prior to appointment to allow for registration/sign in/questionnaires. If you are seen virtually please review after visit summary (AVS)/patient instructions via [employer] for a summary of plan of care/changes in treatment plan. If you are having difficulties logging on or accessing [employer] please contact my office for assistance.    -The best way to get a hold of Todd is to call the office at [phone] and ask to leave a message with his medical assistant. Todd Burgess is a Psychiatric Mental Health Nurse Practitioner, due to his specialty patient's are not able to message him directly via [employer]. Please give my office up to 48 hours to respond to a patient call/question/refill request. Refill requests will be made during normal office hours only, Monday-Friday 8:00-5:30.      -Todd is out of the office on Wednesdays and weekends. Tuesdays and Thursdays are Todd's in-office days, Mondays and Fridays are his telehealth days.  The decision to be seen virtually or in person is up to the discretion of the provider, not all behavioral health problems are appropriate for telehealth.    -Please call the office at [phone] with any worsening of symptoms or onset of intolerable side effects.  -Follow-up appointments must be maintained in order for prescribing to continue.  -Patient has been educated regarding multimodal approach with healthy nutrition, healthy sleep, regular physical activity, social activities, counseling, and medications.   -Please call 911 or go to the nearest ER if you begin to have thoughts of harming yourself or other people.

## 2024-04-22 NOTE — PROGRESS NOTES
Chief Complaint   Patient presents with   • Follow-up   • Irritable Bowel Syndrome       Nicki Fowler is a  49 y.o. female here for a follow up visit for IBS.    HPI  49-year-old female presents today for follow-up visit for IBS-D.  She is a patient of Dr. Mares.  She was last seen in the office on 2018.  She has a history of IBS-D and admits she does great most the time on Pamelor 10 mg once daily at night.  She reports for the last several weeks she is been thinking it might not be working as well as it used to be.  She tells me she is gone monitoring more closely to see if that is really what it is or if it is just her stress at work.  Otherwise she tells me she is doing really well.  She does try to stick to a gluten-free diet as well as a dairy free diet.  She tells me she has history history of lactose intolerance.  She denies any dysphagia, abdominal pain, nausea and vomiting, constipation, rectal bleeding or melena.  She does wear her appetite is good and her weight is stable.  She had her last colonoscopy done in 2018 and she does have a history of hyperplastic colon polyps.  She will be due again in 2023.  Past Medical History:   Diagnosis Date   • Anxiety and depression    • Arthritis    • Cervical disc disorder    • Closed fracture of coccyx (CMS/HCC) 2013   • Colon polyp 2018    Dr. Mares removed during colonoscopy   • Dizziness     constant x 3 weeks   • Headache    • History of seasonal allergies    • IBS (irritable bowel syndrome)    • Lactose intolerance early     dairy causes bloating and gas   • Low back pain    • Lumbosacral disc disease    • Meniere's disease    • Peripheral neuropathy        Past Surgical History:   Procedure Laterality Date   • ABDOMINAL SURGERY  10/10/1996       • ANTERIOR CERVICAL DISCECTOMY W/ FUSION N/A 2018    Procedure: C5-6 ANTERIOR CERVICAL DISCECTOMY FUSION WITH INSTRUMENTATION;  Surgeon: Porter Kendrick MD;  Location: [facility]  MAIN OR;  Service: Neurosurgery   •  SECTION     • COLONOSCOPY N/A 2018    One 6 mm polyp in the ascending colon, [facility].  PATH: Hyperplastic polyp    • EPIDURAL BLOCK      Series of 4 block for cervical ddd   • LASIK     • WISDOM TOOTH EXTRACTION         Scheduled Meds:    Continuous Infusions:  No current facility-administered medications for this visit.     PRN Meds:.    Allergies   Allergen Reactions   • Cinnamon Other (See Comments) and Shortness Of Breath   • Shellfish-Derived Products Hives       Social History     Socioeconomic History   • Marital status:      Spouse name: Not on file   • Number of children: 3   • Years of education: BA   • Highest education level: Not on file   Occupational History   • Occupation:    Tobacco Use   • Smoking status: Former Smoker     Packs/day: 2.00     Years: 20.00     Pack years: 40.00     Types: Cigarettes     Start date:      Last attempt to quit:      Years since quittin.8   • Smokeless tobacco: Never Used   • Tobacco comment: stopped 5 years for pregnancies   Substance and Sexual Activity   • Alcohol use: No     Comment: Social   • Drug use: No   • Sexual activity: Yes     Partners: Male     Birth control/protection: Other   Social History Narrative    LIVES WITH  AND CHILDREN       Family History   Problem Relation Age of Onset   • Cancer Mother         Non-Hodgkins Lymphoma   • Hypertension Mother    • Atrial fibrillation Mother    • Arthritis Mother         DDD   • Heart disease Mother         A-fib   • Hyperlipidemia Mother    • Thyroid disease Mother         Hypothyroid   • Cancer Father         Hodgkins Lymphoma and Non-Hodgkins Lymphoma at two separate times of his life   • Heart attack Father    • Stroke Father    • Hypertension Brother    • Arthritis Brother         DDD   • Hyperlipidemia Brother    • Asthma Daughter    • Arthritis Brother         DDD   • Heart disease Maternal Aunt          Congestive Heart Failure   • Malig Hyperthermia Neg Hx        Review of Systems   Constitutional: Negative for appetite change, chills, diaphoresis, fatigue, fever and unexpected weight change.   HENT: Negative for nosebleeds, postnasal drip, sore throat, trouble swallowing and voice change.    Respiratory: Negative for cough, choking, chest tightness, shortness of breath and wheezing.    Cardiovascular: Negative for chest pain, palpitations and leg swelling.   Gastrointestinal: Negative for abdominal distention, abdominal pain, anal bleeding, blood in stool, constipation, diarrhea, nausea, rectal pain and vomiting.   Endocrine: Negative for polydipsia, polyphagia and polyuria.   Musculoskeletal: Negative for gait problem.   Skin: Negative for rash and wound.   Allergic/Immunologic: Negative for food allergies.   Neurological: Negative for dizziness, speech difficulty and light-headedness.   Psychiatric/Behavioral: Negative for confusion, self-injury, sleep disturbance and suicidal ideas.       Vitals:    11/12/19 1243   BP: 108/76   Temp: 98.6 °F (37 °C)       Physical Exam   Constitutional: She is oriented to person, place, and time. She appears well-developed and well-nourished. She does not appear ill. No distress.   HENT:   Head: Normocephalic.   Eyes: Pupils are equal, round, and reactive to light.   Cardiovascular: Normal rate, regular rhythm and normal heart sounds.   Pulmonary/Chest: Effort normal and breath sounds normal.   Abdominal: Soft. Bowel sounds are normal. She exhibits no distension and no mass. There is no hepatosplenomegaly. There is no tenderness. There is no rebound and no guarding. No hernia.   Musculoskeletal: Normal range of motion.   Neurological: She is alert and oriented to person, place, and time.   Skin: Skin is warm and dry.   Psychiatric: She has a normal mood and affect. Her speech is normal and behavior is normal. Judgment normal.       No images are attached to the  encounter.    Assessment and plan    1. Irritable bowel syndrome with diarrhea    2. Hyperplastic colonic polyp, unspecified part of colon    IBS-D seems to be well controlled on Pamelor 10 mg at night.  Patient to keep a journal of any obvious event so we can see if we need to increase the dose.  Otherwise she seems to be doing really well.  She will be due for another screening colonoscopy on 4/2023.  Patient to call the office in a few weeks with an update patient to follow-up in the office in 1 year.   3

## 2024-04-26 ENCOUNTER — TELEMEDICINE (OUTPATIENT)
Dept: PSYCHIATRY | Facility: CLINIC | Age: 54
End: 2024-04-26
Payer: COMMERCIAL

## 2024-04-26 DIAGNOSIS — F33.1 MAJOR DEPRESSIVE DISORDER, RECURRENT EPISODE, MODERATE: ICD-10-CM

## 2024-04-26 DIAGNOSIS — F41.1 GENERALIZED ANXIETY DISORDER: Primary | ICD-10-CM

## 2024-04-26 PROCEDURE — 90837 PSYTX W PT 60 MINUTES: CPT | Performed by: COUNSELOR

## 2024-04-26 NOTE — PROGRESS NOTES
Date: April 29, 2024  Time In: 8:00  Time out: 9:00      This provider is located at the [facility] [address], [place] [address] using a secure ViViFihart Video Visit through [employer]. Patient is being seen remotely via telehealth at home address in [place] and stated they are in a secure environment for this session. The patient's condition being diagnosed/treated is appropriate for telemedicine. The provider identified herself as well as her credentials. The patient, and/or patients guardian, consent to be seen remotely, and when consent is given they understand that the consent allows for patient identifiable information to be sent to a third party as needed. They may refuse to be seen remotely at any time. The electronic data is encrypted and password protected, and the patient and/or guardian has been advised of the potential risks to privacy not withstanding such measures.     You have chosen to receive care through a telehealth visit.  Do you consent to use a video/audio connection for your medical care today? Yes    Subjective   Nicki Fowler is a 53 y.o. female who presents today for follow up appointment.  Chief Complaint:   Chief Complaint   Patient presents with    Anxiety    Depression        History of Present Illness: Rapport was established through conversation and unconditional positive regard. Recent events were discussed and how these events impact Pt's emotional health.   Pt reports successful use of learned coping skills 5 out of 10 times since last report.  Pt reports continuation of symptoms and states the intensity and duration of symptoms has remained unchanged since last report. Pt rates anxiety at 4/10 and depression at 5/10.     Sleep: Pt reports sleep has remained unchanged since last report. Healthy sleep habits were discussed such as maintaining a schedule, routine, avoiding caffeine, and limiting screen time before  "bed.    Appetite:  Pt reports appetite has been good since last report.  Discussed the importance of hydration and eating a healthy diet for overall and mental health.    Medication compliance: Pt reports medications are being taken daily as prescribed. Discussed the importance of compliance with prescriber's directions and Pt was instructed to report questions/concerns, as well as missed doses or discontinuation of medication by Pt.     Safety Plan in Place: No Pt denies SI/HI/SIB recent or current    Daily Functioning:  Since last report, Pt states symptoms are causing Moderate difficulty in daily functioning.      Subjective Report:  Pt reports some relief in anxiety with new medication.  Sleep is a little better. Some improvement in mood noted. Pt states her anxiety continues to remain heightened primarily due to adult son's mental health issues.  Pt states she has set boundaries with him. Pt reports she has been able to process some feelings of guilt and 'what if' and 'should have' thoughts.  Pt reports she is working on finding a balance of emotion and thinking.  Discussed how mindfulness and acceptance can assist in mitigating feeling overwhelmed \"in the moment\" when it becomes \"a lot.\"  Discussed need to refrain from fighting the negative things in her life and to accept and learn to use copings skills to manage.  Pt will use breathing and progressive muscle relaxation techniques to mitigate anxiety symptoms. Discussed co-dependency and the importance of knowing when and how to set boundaries.    .  Reviewed coping skills and encouraged Pt to continue to practicing coping skills daily when not under distress. Pt was praised for their attempts to decrease symptoms and mitigate activating events.    Clinical Maneuvering/Intervention:  CBT was utilized to encourage Pt to identify maladaptive thoughts and behaviors and replace with more affirming and positive.Pt encouraged to set and maintain appropriate and " healthy boundaries with others. Pt was instructed to practice daily using appropriate and specific words to communicate feelings to others  Encouraged Pt to gain a feeling of empowerment over their situation by being cognizant of the things they are able to manipulate and control, such as choosing an outfit for the day or what they will have for lunch. Motivational interviewing used to encourage Pt to identify strengths which can be utilized in working toward treatment goals. Pt encouraged to practice daily learned skills such as controlled breathing, grounding, and mindfulness. Pt was encouraged to ask for help from support persons to assist them in maintaining stability and alleviate symptoms. Discussed the importance of being one's own mental health advocate and to refrain from seeing the need to ask for help as a weakness. Pt was encouraged to formulate a plan of action to be more proactive in managing stressors and refrain from using reactive and automatic heightened emotional responses.     Solution-focused therapy employed to identify how Pt would like their life to be if they were to make positive changes. Pt encouraged to identify effective coping skills and strengths they can use to continue utilizing those skills. Pt encouraged to discontinue utilizing non-effective coping mechanisms. Pt provided with feedback to highlight achievements and personal and other resources. Encouraged use of SMART goals    Assisted patient in processing above session content; acknowledged and normalized patient’s thoughts, feelings, and concerns.  Rationalized patient thought process regarding concerns presented at session.  Discussed triggers associated with patient's  anxiety  and depression  Also discussed coping skills for patient to implement such as grounding  and positive self talk     Allowed patient to freely discuss issues without interruption or judgment. Provided safe, confidential environment to facilitate the  development of positive therapeutic relationship and encourage open, honest communication. Assisted patient in identifying risk factors which would indicate the need for higher level of care including thoughts to harm self or others and/or self-harming behavior and encouraged patient to contact this office, call 911, or present to the nearest emergency room should any of these events occur. Discussed crisis intervention services and means to access. Patient adamantly and convincingly denies current suicidal or homicidal ideation or perceptual disturbance.    Assessment:     Patient appears to maintain relative stability as compared to their baseline.  However, patient persistently struggles with symptoms which continue to cause impairment in important areas of functioning.  As a result, they can be reasonably expected to continue to benefit from treatment and would likely be at increased risk for decompensation otherwise.      PHQ-Score Total:  PHQ-9 Total Score: 10    EDITH-7 Score Total:  Over the last two weeks, how often have you been bothered by the following problems?  Feeling nervous, anxious or on edge: More than half the days  Not being able to stop or control worrying: More than half the days  Worrying too much about different things: More than half the days  Trouble Relaxing: More than half the days  Being so restless that it is hard to sit still: Several days  Becoming easily annoyed or irritable: Several days  Feeling afraid as if something awful might happen: More than half the days  EDITH 7 Total Score: 12  If you checked any problems, how difficult have these problems made it for you to do your work, take care of things at home, or get along with other people: Somewhat difficult      Mental Status Exam:   Hygiene:   good  Cooperation:  Cooperative  Eye Contact:  Good  Psychomotor Behavior:  Appropriate  Affect:  Full range  Mood: anxious  Speech:  Normal  Thought Process:  Goal directed  Thought Content:   Normal  Suicidal:  None  Homicidal:  None  Hallucinations:  None  Delusion:  None  Memory:  Intact  Orientation:  Grossly intact  Reliability:  good  Insight:  Good  Judgement:  Good  Impulse Control:  Good  Physical/Medical Issues:  No        Functional Status: Moderate impairment     Progress toward goal: Not at goal    Prognosis: Good with continued therapeutic intervention        Plan:    Patient will continue in individual outpatient therapy with focus on improved functioning and coping skills, maintaining stability, and avoiding decompensation and the need for higher level of care.    Patient will adhere to medication regimen as prescribed and report any side effects. Patient will contact this office, call 911 or present to the nearest emergency room should suicidal or homicidal ideations occur. Provide Cognitive Behavioral Therapy and Solution Focused Therapy to improve functioning, maintain stability, and avoid decompensation and the need for higher level of care.     Return in about 2 weeks (around 5/10/2024).      VISIT DIAGNOSIS:    Diagnosis Plan   1. Generalized anxiety disorder        2. Major depressive disorder, recurrent episode, moderate         08:00 EDT       This document has been electronically signed by EDWIN Hastings  April 29, 2024      Part of this note may be an electronic transcription/translation of spoken language to printed text using the Dragon Dictation System.

## 2024-05-16 ENCOUNTER — TELEMEDICINE (OUTPATIENT)
Dept: PSYCHIATRY | Facility: CLINIC | Age: 54
End: 2024-05-16
Payer: COMMERCIAL

## 2024-05-16 DIAGNOSIS — F41.1 GENERALIZED ANXIETY DISORDER: Primary | ICD-10-CM

## 2024-05-16 DIAGNOSIS — F33.1 MAJOR DEPRESSIVE DISORDER, RECURRENT EPISODE, MODERATE: ICD-10-CM

## 2024-05-16 NOTE — PROGRESS NOTES
Date: May 17, 2024  Time In: 10:02  Time out: 10:53      This provider is located at the [facility]), [address], [place] [address] using a secure Blaze healthhart Video Visit through [employer]. Patient is being seen remotely via telehealth at home address in [place] and stated they are in a secure environment for this session. The patient's condition being diagnosed/treated is appropriate for telemedicine. The provider identified herself as well as her credentials. The patient, and/or patients guardian, consent to be seen remotely, and when consent is given they understand that the consent allows for patient identifiable information to be sent to a third party as needed. They may refuse to be seen remotely at any time. The electronic data is encrypted and password protected, and the patient and/or guardian has been advised of the potential risks to privacy not withstanding such measures.     You have chosen to receive care through a telehealth visit.  Do you consent to use a video/audio connection for your medical care today? Yes    Subjective   Nicki Fowler is a 53 y.o. female who presents today for follow up appointment.    Chief Complaint:   Chief Complaint   Patient presents with    Anxiety    Depression        History of Present Illness: Rapport was established through conversation and unconditional positive regard. Recent events were discussed and how these events impact Pt's emotional health.   Pt reports successful use of learned coping skills 7 out of 10 times since last report.  Pt reports continuation of symptoms and states the intensity and duration of symptoms has improved since last report. Pt rates anxiety at 2/10 and depression at 2/10.     Sleep: Pt reports sleep has remained unchanged since last report. Healthy sleep habits were discussed such as maintaining a schedule, routine, avoiding caffeine, and limiting screen time before  "bed.    Appetite:  Pt reports appetite has been good since last report.  Discussed the importance of hydration and eating a healthy diet for overall and mental health.    Medication compliance: Pt reports medications are being taken daily as prescribed. Discussed the importance of compliance with prescriber's directions and Pt was instructed to report questions/concerns, as well as missed doses or discontinuation of medication by Pt.     Safety Plan in Place: No Pt denies SI/HI/SIB recent or current    Daily Functioning:  Since last report, Pt states symptoms are causing Mild difficulty in daily functioning.      Subjective Report:  Pt states her adult son has been in contact a couple of times and this was a positive experience for pt. States the situation with him has been \"pretty quiet\" but Pt states she feels like the situation is going to turn into something negative. States she has difficulty being in the moment due to anxiety about 'what if' scenarios she plays in her head. Discussed mindfulness techniques and processing thoughts by using sand tray and other methods that encourage being in the here and now. States she has been \"blah\"  Things at work have been weird and things at home are also off.  Pt reports she has been sick and this is likely the cause of the malaise.  Repots she will practice self care and rest to charge her battery.     .  Reviewed coping skills and encouraged Pt to continue to practicing coping skills daily when not under distress. Pt was praised for their attempts to decrease symptoms and mitigate activating events.    Clinical Maneuvering/Intervention:  CBT was utilized to encourage Pt to identify maladaptive thoughts and behaviors and replace with more affirming and positive.Pt encouraged to set and maintain appropriate and healthy boundaries with others. Pt was instructed to practice daily using appropriate and specific words to communicate feelings to others.  Motivational " interviewing used to encourage Pt to identify strengths which can be utilized in working toward treatment goals. Pt encouraged to practice daily learned skills such as controlled breathing, grounding, and mindfulness. Pt was encouraged to ask for help from support persons to assist them in maintaining stability and alleviate symptoms. Discussed the importance of being one's own mental health advocate and to refrain from seeing the need to ask for help as a weakness. Pt was encouraged to formulate a plan of action to be more proactive in managing stressors and refrain from using reactive and automatic heightened emotional responses.     Solution-focused therapy employed to identify how Pt would like their life to be if they were to make positive changes. Pt encouraged to identify effective coping skills and strengths they can use to continue utilizing those skills. Pt encouraged to discontinue utilizing non-effective coping mechanisms. Pt provided with feedback to highlight achievements and personal and other resources. Encouraged use of SMART goals    Assisted patient in processing above session content; acknowledged and normalized patient’s thoughts, feelings, and concerns.  Rationalized patient thought process regarding concerns presented at session.  Discussed triggers associated with patient's  anxiety  and depression  Also discussed coping skills for patient to implement such as self care , positive self talk , and setting and maintaining boundaries.    Allowed patient to freely discuss issues without interruption or judgment. Provided safe, confidential environment to facilitate the development of positive therapeutic relationship and encourage open, honest communication. Assisted patient in identifying risk factors which would indicate the need for higher level of care including thoughts to harm self or others and/or self-harming behavior and encouraged patient to contact this office, call 911, or present to  the nearest emergency room should any of these events occur. Discussed crisis intervention services and means to access. Patient adamantly and convincingly denies current suicidal or homicidal ideation or perceptual disturbance.    Assessment:     Patient appears to maintain relative stability as compared to their baseline.  However, patient persistently struggles with symptoms which continue to cause impairment in important areas of functioning.  As a result, they can be reasonably expected to continue to benefit from treatment and would likely be at increased risk for decompensation otherwise.      PHQ-Score Total:  PHQ-9 Total Score: 8    EDITH-7 Score Total:         Mental Status Exam:   Hygiene:   good  Cooperation:  Cooperative  Eye Contact:  Good  Psychomotor Behavior:  Appropriate  Affect:  Full range  Mood: anxious  Speech:  Normal  Thought Process:  Goal directed  Thought Content:  Normal  Suicidal:  None  Homicidal: None  Hallucinations:  None  Delusion: None  Memory:  Intact  Orientation:  Grossly Intact  Reliability:  good  Insight:  Good  Judgement:  Good  Impulse Control:  Good  Physical/Medical Issues:  No        Functional Status: Mild impairment     Progress toward goal: Not at goal    Prognosis: Good with continued therapeutic intervention        Plan:    Patient will continue in individual outpatient therapy with focus on improved functioning and coping skills, maintaining stability, and avoiding decompensation and the need for higher level of care.    Patient will adhere to medication regimen as prescribed and report any side effects. Patient will contact this office, call 911 or present to the nearest emergency room should suicidal or homicidal ideations occur. Provide Cognitive Behavioral Therapy and Solution Focused Therapy to improve functioning, maintain stability, and avoid decompensation and the need for higher level of care.     Return in about 2 weeks (around 5/30/2024).      VISIT  DIAGNOSIS:    Diagnosis Plan   1. Generalized anxiety disorder        2. Major depressive disorder, recurrent episode, moderate         10:02 EDT       This document has been electronically signed by EDWIN Hastings  May 17, 2024      Part of this note may be an electronic transcription/translation of spoken language to printed text using the Dragon Dictation System.

## 2024-05-17 NOTE — TREATMENT PLAN
"Multi-Disciplinary Problems (from Behavioral Health Treatment Plan)      Active Problems       Problem: Adjustment  Start Date: 05/17/24      Problem Details: The patient self-scales this problem as a 4 with 10 being the worst.          Goal Priority Start Date Expected End Date End Date    Patient will implement healthy coping strategies. -- 05/17/24 -- --    Goal Details: Progress toward goal:  Not appropriate to rate progress toward goal since this is the initial treatment plan.          Goal Intervention Frequency Start Date End Date    Assist patient to identify and develop healthy coping strategies Q2 Weeks 05/17/24 --    Intervention Details: Duration of treatment until until remission of symptoms.                  Problem: Anxiety  Start Date: 05/17/24      Problem Details: The patient self-scales this problem as a 2 with 10 being the worst.    Pt states this scale represents a \"good day\"  Pt rates a stressful day at 7/10        Goal Priority Start Date Expected End Date End Date    Patient will develop and implement behavioral and cognitive strategies to reduce anxiety and irrational fears. -- 05/17/24 -- --    Goal Details: Progress toward goal:  Not appropriate to rate progress toward goal since this is the initial treatment plan.          Goal Intervention Frequency Start Date End Date    Help patient explore past emotional issues in relation to present anxiety. Q2 Weeks 05/17/24 --    Intervention Details: Duration of treatment until until remission of symptoms.          Goal Intervention Frequency Start Date End Date    Help patient develop an awareness of their cognitive and physical responses to anxiety. Q2 Weeks 05/17/24 --    Intervention Details: Duration of treatment until until remission of symptoms.                  Problem: Depression  Start Date: 05/17/24      Problem Details: The patient self-scales this problem as a 2 with 10 being the worst.          Goal Priority Start Date Expected End " Date End Date    Patient will demonstrate the ability to initiate new constructive life skills outside of sessions on a consistent basis. -- 05/17/24 -- --    Goal Details: Progress toward goal:  Not appropriate to rate progress toward goal since this is the initial treatment plan.          Goal Intervention Frequency Start Date End Date    Assist patient in setting attainable activities of daily living goals. Q2 Weeks 05/17/24 --      Goal Intervention Frequency Start Date End Date    Provide education about depression Q2 Weeks 05/17/24 --    Intervention Details: Duration of treatment until until remission of symptoms.    Set boundaries with adult children        Goal Intervention Frequency Start Date End Date    Assist patient in developing healthy coping strategies. Q2 Weeks 05/17/24 --    Intervention Details: Duration of treatment until until remission of symptoms.                  Problem: Ineffective Coping  Start Date: 05/17/24      Problem Details: The patient self-scales this problem as a 3 with 10 being the worst.          Goal Priority Start Date Expected End Date End Date    Patient will demonstrate the ability to initiate new constructive life skills consistently. -- 05/17/24 -- --    Goal Details: Progress toward goal:  Not appropriate to rate progress toward goal since this is the initial treatment plan.            Goal Intervention Frequency Start Date End Date    Assist patient in identifying healthy coping behaviors. Q2 Weeks 05/17/24 --    Intervention Details: Duration of treatment until until remission of symptoms.                                 I have discussed and reviewed this treatment plan with the patient.

## 2024-05-25 DIAGNOSIS — R12 HEARTBURN: ICD-10-CM

## 2024-05-27 RX ORDER — PANTOPRAZOLE SODIUM 40 MG/1
40 TABLET, DELAYED RELEASE ORAL DAILY
Qty: 30 TABLET | Refills: 1 | Status: SHIPPED | OUTPATIENT
Start: 2024-05-27

## 2024-05-28 ENCOUNTER — TELEMEDICINE (OUTPATIENT)
Dept: PSYCHIATRY | Facility: CLINIC | Age: 54
End: 2024-05-28
Payer: COMMERCIAL

## 2024-05-28 DIAGNOSIS — F41.1 GENERALIZED ANXIETY DISORDER: Primary | ICD-10-CM

## 2024-05-28 PROCEDURE — 90834 PSYTX W PT 45 MINUTES: CPT | Performed by: COUNSELOR

## 2024-05-28 NOTE — PROGRESS NOTES
Date: May 28, 2024  Time In: 9:07  Time out: 9:53      This provider is located at [facility]), [address], [place] [address] using a secure [employer] Video Visit through [employer]. Patient is being seen remotely via telehealth at home address in [place] and stated they are in a secure environment for this session. The patient's condition being diagnosed/treated is appropriate for telemedicine. The provider identified herself as well as her credentials. The patient, and/or patients guardian, consent to be seen remotely, and when consent is given they understand that the consent allows for patient identifiable information to be sent to a third party as needed. They may refuse to be seen remotely at any time. The electronic data is encrypted and password protected, and the patient and/or guardian has been advised of the potential risks to privacy not withstanding such measures.     You have chosen to receive care through a telehealth visit.  Do you consent to use a video/audio connection for your medical care today? Yes    Subjective   Nicki Fowler is a 53 y.o. female who presents today fully oriented, appropriately dressed and groomed, and open to communication for follow up appointment.    Chief Complaint:   Chief Complaint   Patient presents with    Anxiety        History of Present Illness: Rapport was established through conversation and unconditional positive regard. Recent events were discussed and how these events impact Pt's emotional health.   Pt reports successful use of learned coping skills 8 out of 10 times since last report.  Pt reports continuation of symptoms and states the intensity and duration of symptoms has improved since last report. Pt rates anxiety at 1/10 and depression at 1/10.     Sleep: Pt reports sleep has improved since last report. Healthy sleep habits were discussed such as maintaining a schedule, routine, avoiding  "caffeine, and limiting screen time before bed.  Sleep has been \"a little bit\" more restful, and Pt states she attributes this to having less anxiety.    Appetite:  Pt reports appetite has been good since last report.  Discussed the importance of hydration and eating a healthy diet for overall and mental health.    Medication compliance: Pt reports medications are being taken daily as prescribed. Discussed the importance of compliance with prescriber's directions and Pt was instructed to report questions/concerns, as well as missed doses or discontinuation of medication by Pt.     Safety Plan in Place: No Pt denies SI/HI/SIB recent or current    Daily Functioning:  Since last report, Pt states symptoms are causing Mild difficulty in daily functioning.      Subjective Report:  Pt states the family had a good weekend and she and her son had a good interaction when she took him shopping.  Pt states they spent about 3 hours together and it was a nice non-confrontational visit.  Pt states she set a boundary for him by refusing to buy him beer at the store.  States she used a calm voice and turned her cart and refrained from reacting to his request. States she has been refraining from \"walking on eggshells\" and she has been been \"go with the flow\".    States she was able to be much more proactive than reaction with her son and with anxious situations in general.  She uses different body language and approach with her voice and temperament and this was very beneficial for her feeling of emotional control.  States she now has a better understanding of how she can chagne situation by changing her approach and using her positive communication skills. Pt praised for her effective use of skills and Pt was encouraged to continue practicing.    .  Reviewed coping skills and encouraged Pt to continue to practicing coping skills daily when not under distress. Pt was praised for their attempts to decrease symptoms and mitigate " activating events.    Clinical Maneuvering/Intervention:  CBT was utilized to encourage Pt to identify maladaptive thoughts and behaviors and replace with more affirming and positive.Pt encouraged to set and maintain appropriate and healthy boundaries with others. Pt was instructed to practice daily using appropriate and specific words to communicate feelings to others.  Motivational interviewing used to encourage Pt to identify strengths which can be utilized in working toward treatment goals. Pt encouraged to practice daily learned skills such as controlled breathing, grounding, and mindfulness. Pt was encouraged to ask for help from support persons to assist them in maintaining stability and alleviate symptoms. Discussed the importance of being one's own mental health advocate and to refrain from seeing the need to ask for help as a weakness. Pt was encouraged to formulate a plan of action to be more proactive in managing stressors and refrain from using reactive and automatic heightened emotional responses.     Solution-focused therapy employed to identify how Pt would like their life to be if they were to make positive changes. Pt encouraged to identify effective coping skills and strengths they can use to continue utilizing those skills. Pt encouraged to discontinue utilizing non-effective coping mechanisms. Pt provided with feedback to highlight achievements and personal and other resources. Encouraged use of SMART goals    Assisted patient in processing above session content; acknowledged and normalized patient’s thoughts, feelings, and concerns.  Rationalized patient thought process regarding concerns presented at session.  Discussed triggers associated with patient's  anxiety  and depression  Also discussed coping skills for patient to implement such as grounding , self care , and positive self talk     Allowed patient to freely discuss issues without interruption or judgment. Provided safe, confidential  environment to facilitate the development of positive therapeutic relationship and encourage open, honest communication. Assisted patient in identifying risk factors which would indicate the need for higher level of care including thoughts to harm self or others and/or self-harming behavior and encouraged patient to contact this office, call 911, or present to the nearest emergency room should any of these events occur. Discussed crisis intervention services and means to access. Patient adamantly and convincingly denies current suicidal or homicidal ideation or perceptual disturbance.    Assessment:     Patient appears to maintain relative stability as compared to their baseline.  However, patient persistently struggles with symptoms which continue to cause impairment in important areas of functioning.  As a result, they can be reasonably expected to continue to benefit from treatment and would likely be at increased risk for decompensation otherwise.      PHQ-Score Total:  PHQ-9 Total Score: 9    EDITH-7 Score Total:         Mental Status Exam:   Hygiene:   good  Cooperation:  Cooperative  Eye Contact:  Good  Psychomotor Behavior:  Appropriate  Affect:  Full range  Mood: normal  Speech:  Normal  Thought Process:  Goal directed  Thought Content:  Normal  Suicidal:  None  Homicidal: None  Hallucinations:  None  Delusion: None  Memory:  Intact  Orientation:  Grossly Intact  Reliability:  good  Insight:  Good  Judgement:  Good  Impulse Control:  Good  Physical/Medical Issues:  No        Functional Status: Mild impairment     Progress toward goal: Not at goal    Prognosis: Good with continued therapeutic intervention        Plan:    Patient will continue in individual outpatient therapy with focus on improved functioning and coping skills, maintaining stability, and avoiding decompensation and the need for higher level of care.    Patient will adhere to medication regimen as prescribed and report any side effects. Patient  will contact this office, call 911 or present to the nearest emergency room should suicidal or homicidal ideations occur. Provide Cognitive Behavioral Therapy and Solution Focused Therapy to improve functioning, maintain stability, and avoid decompensation and the need for higher level of care.     Return in about 2 weeks (around 6/11/2024).      VISIT DIAGNOSIS:    Diagnosis Plan   1. Generalized anxiety disorder         09:07 EDT       This document has been electronically signed by EDWIN Hastings  May 28, 2024      Part of this note may be an electronic transcription/translation of spoken language to printed text using the Dragon Dictation System.

## 2024-06-13 ENCOUNTER — TELEMEDICINE (OUTPATIENT)
Dept: PSYCHIATRY | Facility: CLINIC | Age: 54
End: 2024-06-13
Payer: COMMERCIAL

## 2024-06-13 DIAGNOSIS — F41.1 GENERALIZED ANXIETY DISORDER: Primary | ICD-10-CM

## 2024-06-13 DIAGNOSIS — F33.1 MAJOR DEPRESSIVE DISORDER, RECURRENT EPISODE, MODERATE: ICD-10-CM

## 2024-06-13 NOTE — PROGRESS NOTES
"Date: June 13, 2024  Time In: 8:01  Time out: 8:55      This provider is located at the [facility] ([facility]), [address], [place] [address] using a secure [employer] Video Visit through [employer]. Patient is being seen remotely via telehealth at home address in [place] and stated they are in a secure environment for this session. The patient's condition being diagnosed/treated is appropriate for telemedicine. The provider identified herself as well as her credentials. The patient, and/or patients guardian, consent to be seen remotely, and when consent is given they understand that the consent allows for patient identifiable information to be sent to a third party as needed. They may refuse to be seen remotely at any time. The electronic data is encrypted and password protected, and the patient and/or guardian has been advised of the potential risks to privacy not withstanding such measures.     You have chosen to receive care through a telehealth visit.  Do you consent to use a video/audio connection for your medical care today? Yes    Subjective   Nicki Fowler is a 53 y.o. female who presents today fully oriented, appropriately dressed and groomed, and open to communication for follow up appointment.    Chief Complaint:   Chief Complaint   Patient presents with    Anxiety    Depression        History of Present Illness: Rapport was established through conversation and unconditional positive regard. Recent events were discussed and how these events impact Pt's emotional health.   Pt reports successful use of learned coping skills 6 out of 10 times since last report.  Pt reports continuation of symptoms and states the intensity and duration of symptoms has worsened a little since last report. Pt rates anxiety at 8/10 and depression at 4/10.     Sleep: Pt reports sleep has worsened \"a little\" since last report. Healthy sleep habits were discussed such as " maintaining a schedule, routine, avoiding caffeine, and limiting screen time before bed.  Attributes to situational events   Difficulty with thought rumination before onset.    Appetite:  Pt reports appetite has been fair  since last report.  Discussed the importance of hydration and eating a healthy diet for overall and mental health.  Vacillates between over and under eating.  Stress related.  Situational.    Medication compliance: Pt reports medications are being taken daily as prescribed. Discussed the importance of compliance with prescriber's directions and Pt was instructed to report questions/concerns, as well as missed doses or discontinuation of medication by Pt.     Safety Plan in Place: No Pt denies SI/HI/SIB recent or current    Daily Functioning:  Since last report, Pt states symptoms are causing Moderate difficulty in daily functioning.      Subjective Report:  Pt states she feels better overall but a recent event occurred that caused anxiety.  Reports she went for a medical test and was not informed she needed to stop meds for the test and the test had to be rescheduled.  Pt states her main source of anxiety is this weekend she and s/o are going to move her son and she is nervious about this. States while communication between her and son has improved a little, Pt continues to be anxious about the unpredictability of his mental health issues. Pt gave the example of yesterday Pt was in her office working at home and Pt thought her son and  were having a heated discussion and Pt had a panic attack.  Even after learning it was not her son at all, she had difficulty recovering. Discussed ways Pt can use calming and coping skills to return to baseline after automatic heightened responses.  Pt states this was helpful. Discussed ways Pt can use compartmentalization and visualization techniques, such as visualizing file folders or desk icons to manage thoughts and gain some feeling of control over  situations.  Pt reports she will try this and put into place before the weekend and report success/issues at next session.    .  Reviewed coping skills and encouraged Pt to continue to practicing coping skills daily when not under distress. Pt was praised for their attempts to decrease symptoms and mitigate activating events.    Clinical Maneuvering/Intervention:  CBT was utilized to encourage Pt to identify maladaptive thoughts and behaviors and replace with more affirming and positive.Pt encouraged to set and maintain appropriate and healthy boundaries with others. Pt was instructed to practice daily using appropriate and specific words to communicate feelings to others.  Motivational interviewing used to encourage Pt to identify strengths which can be utilized in working toward treatment goals. Pt encouraged to practice daily learned skills such as controlled breathing, grounding, and mindfulness. Pt was encouraged to ask for help from support persons to assist them in maintaining stability and alleviate symptoms. Discussed the importance of being one's own mental health advocate and to refrain from seeing the need to ask for help as a weakness. Pt was encouraged to formulate a plan of action to be more proactive in managing stressors and refrain from using reactive and automatic heightened emotional responses.     Solution-focused therapy employed to identify how Pt would like their life to be if they were to make positive changes. Pt encouraged to identify effective coping skills and strengths they can use to continue utilizing those skills. Pt encouraged to discontinue utilizing non-effective coping mechanisms. Pt provided with feedback to highlight achievements and personal and other resources. Encouraged use of SMART goals    Assisted patient in processing above session content; acknowledged and normalized patient’s thoughts, feelings, and concerns.  Rationalized patient thought process regarding concerns  presented at session.  Discussed triggers associated with patient's  anxiety  and depression  Also discussed coping skills for patient to implement such as grounding , self care , and positive self talk     Allowed patient to freely discuss issues without interruption or judgment. Provided safe, confidential environment to facilitate the development of positive therapeutic relationship and encourage open, honest communication. Assisted patient in identifying risk factors which would indicate the need for higher level of care including thoughts to harm self or others and/or self-harming behavior and encouraged patient to contact this office, call 911, or present to the nearest emergency room should any of these events occur. Discussed crisis intervention services and means to access. Patient adamantly and convincingly denies current suicidal or homicidal ideation or perceptual disturbance.    Assessment:     Patient appears to maintain relative stability as compared to their baseline.  However, patient persistently struggles with symptoms which continue to cause impairment in important areas of functioning.  As a result, they can be reasonably expected to continue to benefit from treatment and would likely be at increased risk for decompensation otherwise.      PHQ-Score Total:  PHQ-9 Total Score: 7    EDITH-7 Score Total:         Mental Status Exam:   Hygiene:   good  Cooperation:  Cooperative  Eye Contact:  Good  Psychomotor Behavior:  Appropriate  Affect:  Full range  Mood: anxious  Speech:  Normal  Thought Process:  Goal directed  Thought Content:  Normal  Suicidal:  None  Homicidal: None  Hallucinations:  None  Delusion: None  Memory:  Intact  Orientation:  Grossly Intact  Reliability:  good  Insight:  Good  Judgement:  Good  Impulse Control:  Good  Physical/Medical Issues:  No        Functional Status: Moderate impairment     Progress toward goal: Not at goal    Prognosis: Good with continued therapeutic  intervention        Plan:    Patient will continue in individual outpatient therapy with focus on improved functioning and coping skills, maintaining stability, and avoiding decompensation and the need for higher level of care.    Patient will adhere to medication regimen as prescribed and report any side effects. Patient will contact this office, call 911 or present to the nearest emergency room should suicidal or homicidal ideations occur. Provide Cognitive Behavioral Therapy and Solution Focused Therapy to improve functioning, maintain stability, and avoid decompensation and the need for higher level of care.     Return in about 2 weeks (around 6/27/2024).      VISIT DIAGNOSIS:    Diagnosis Plan   1. Generalized anxiety disorder        2. Major depressive disorder, recurrent episode, moderate         08:01 EDT       This document has been electronically signed by EDWIN Hastings  June 13, 2024      Part of this note may be an electronic transcription/translation of spoken language to printed text using the Dragon Dictation System.

## 2024-06-25 DIAGNOSIS — R12 HEARTBURN: ICD-10-CM

## 2024-06-25 RX ORDER — PANTOPRAZOLE SODIUM 40 MG/1
40 TABLET, DELAYED RELEASE ORAL DAILY
Qty: 30 TABLET | Refills: 1 | Status: SHIPPED | OUTPATIENT
Start: 2024-06-25

## 2024-07-22 ENCOUNTER — TELEMEDICINE (OUTPATIENT)
Dept: PSYCHIATRY | Facility: CLINIC | Age: 54
End: 2024-07-22
Payer: COMMERCIAL

## 2024-07-22 DIAGNOSIS — R12 HEARTBURN: ICD-10-CM

## 2024-07-22 DIAGNOSIS — F33.1 MAJOR DEPRESSIVE DISORDER, RECURRENT EPISODE, MODERATE: ICD-10-CM

## 2024-07-22 DIAGNOSIS — F41.1 GENERALIZED ANXIETY DISORDER: Primary | ICD-10-CM

## 2024-07-22 PROCEDURE — 90834 PSYTX W PT 45 MINUTES: CPT | Performed by: COUNSELOR

## 2024-07-22 RX ORDER — PANTOPRAZOLE SODIUM 40 MG/1
40 TABLET, DELAYED RELEASE ORAL DAILY
Qty: 30 TABLET | Refills: 1 | Status: SHIPPED | OUTPATIENT
Start: 2024-07-22

## 2024-07-22 NOTE — TELEPHONE ENCOUNTER
Rx Refill Note  Requested Prescriptions     Pending Prescriptions Disp Refills    pantoprazole (PROTONIX) 40 MG EC tablet 30 tablet 1     Sig: Take 1 tablet by mouth Daily.      Last office visit with prescribing clinician: 3/29/2024   Last telemedicine visit with prescribing clinician: Visit date not found   Next office visit with prescribing clinician: Visit date not found     Celestina Sherman MA  07/22/24, 09:34 EDT

## 2024-07-22 NOTE — PROGRESS NOTES
Date: July 25, 2024  Time In: 9:02  Time out: 9:54      This provider is located at the [facility]through [facility]), [address], [place] [address] using a secure [employer] Video Visit through [employer]. Patient is being seen remotely via telehealth at home address in [place] and stated they are in a secure environment for this session. The patient's condition being diagnosed/treated is appropriate for telemedicine. The provider identified herself as well as her credentials. The patient, and/or patients guardian, consent to be seen remotely, and when consent is given they understand that the consent allows for patient identifiable information to be sent to a third party as needed. They may refuse to be seen remotely at any time. The electronic data is encrypted and password protected, and the patient and/or guardian has been advised of the potential risks to privacy not withstanding such measures.     You have chosen to receive care through a telehealth visit.  Do you consent to use a video/audio connection for your medical care today? Yes    Subjective   Nicki Fowler is a 53 y.o. female who presents today fully oriented, appropriately dressed and groomed, and open to communication for follow up appointment.    Chief Complaint:   Chief Complaint   Patient presents with    Anxiety    Depression        History of Present Illness: Rapport was established through conversation and unconditional positive regard. Recent events were discussed and how these events impact Pt's emotional health.   Pt reports successful use of learned coping skills 5 out of 10 times since last report.  Pt reports continuation of symptoms and states the intensity and duration of symptoms has remained unchanged since last report. Pt rates anxiety at 1/10 and depression at 3/10.     Sleep: Pt reports sleep has remained unchanged since last report. Healthy sleep habits were discussed such as  "maintaining a schedule, routine, avoiding caffeine, and limiting screen time before bed. States she has some thought rumination which delays onset. Pt will keep a notepad by her bed to write down the thought.    Appetite:  Pt reports appetite has been good since last report.  Discussed the importance of hydration and eating a healthy diet for overall and mental health.    Medication compliance: Pt reports medications are being taken daily as prescribed. Discussed the importance of compliance with prescriber's directions and Pt was instructed to report questions/concerns, as well as missed doses or discontinuation of medication by Pt.     Safety Plan in Place: No Details: Pt denies SI/HI/SIB recent or current    Daily Functioning:  Since last report, Pt states symptoms are causing Moderate difficulty in daily functioning.      Subjective Report:  Pt states she feels \"scattered everywhere,\" and a little angry and sad. States she discussed with with a friend.  just started new job and other situations that make Pt feel she is not where she thought she would be at this stage of life. \"Everything is difficult.\"   And  \"I Just want easy for a while\"  Discussed Pt refraining from responding to son's requests as needed, and giving him parameters in which she will help him.  Pt states she will give him a set budget for food instead of sending him money as he requests it. Pt was allowed to verbally process some feelings of guilt and anxiety about her situation with her son, as well as Pt's need to mitigate situations before they arise.  Encouraged mindfulness and being in the 'here and now' and to refrain from what if thinking.  Pt was allowed to verbally process some feelings of resentment and Pt states this was helpful.    .  Reviewed coping skills and encouraged Pt to continue to practicing coping skills daily when not under distress. Pt was praised for their attempts to decrease symptoms and mitigate activating " events.    Clinical Maneuvering/Intervention:  CBT was utilized to encourage Pt to identify maladaptive thoughts and behaviors and replace with more affirming and positive.Pt encouraged to set and maintain appropriate and healthy boundaries with others. Pt was instructed to practice daily using appropriate and specific words to communicate feelings to others.  Motivational interviewing used to encourage Pt to identify strengths which can be utilized in working toward treatment goals. Pt encouraged to practice daily learned skills such as controlled breathing, grounding, and mindfulness. Pt was encouraged to ask for help from support persons to assist them in maintaining stability and alleviate symptoms. Discussed the importance of being one's own mental health advocate and to refrain from seeing the need to ask for help as a weakness. Pt was encouraged to formulate a plan of action to be more proactive in managing stressors and refrain from using reactive and automatic heightened emotional responses.     Solution-focused therapy employed to identify how Pt would like their life to be if they were to make positive changes. Pt encouraged to identify effective coping skills and strengths they can use to continue utilizing those skills. Pt encouraged to discontinue utilizing non-effective coping mechanisms. Pt provided with feedback to highlight achievements and personal and other resources. Encouraged use of SMART goals    Assisted patient in processing above session content; acknowledged and normalized patient’s thoughts, feelings, and concerns.  Rationalized patient thought process regarding concerns presented at session.  Discussed triggers associated with patient's  anxiety  Also discussed coping skills for patient to implement such as positive self talk  and setting boundaries.    Allowed patient to freely discuss issues without interruption or judgment. Provided safe, confidential environment to facilitate the  development of positive therapeutic relationship and encourage open, honest communication. Assisted patient in identifying risk factors which would indicate the need for higher level of care including thoughts to harm self or others and/or self-harming behavior and encouraged patient to contact this office, call 911, or present to the nearest emergency room should any of these events occur. Discussed crisis intervention services and means to access. Patient adamantly and convincingly denies current suicidal or homicidal ideation or perceptual disturbance.    Assessment:     Patient appears to maintain relative stability as compared to their baseline.  However, patient persistently struggles with symptoms which continue to cause impairment in important areas of functioning.  As a result, they can be reasonably expected to continue to benefit from treatment and would likely be at increased risk for decompensation otherwise.      PHQ-Score Total:  PHQ-9 Total Score: 15    EDITH-7 Score Total:         Mental Status Exam:   Hygiene:   good  Cooperation:  Cooperative  Eye Contact:  Good  Psychomotor Behavior:  Appropriate  Affect:  Full range  Mood: anxious  Speech:  Normal  Thought Process:  Goal directed  Thought Content:  Normal  Suicidal:  None  Homicidal: None  Hallucinations:  None  Delusion: None  Memory:  Intact  Orientation:  Grossly Intact  Reliability:  good  Insight:  Good  Judgement:  Good  Impulse Control:  Fair  Some over spending  Physical/Medical Issues:  No        Functional Status: Moderate impairment     Progress toward goal: Not at goal    Prognosis: Good with continued therapeutic intervention        Plan:    Patient will continue in individual outpatient therapy with focus on improved functioning and coping skills, maintaining stability, and avoiding decompensation and the need for higher level of care.    Patient will adhere to medication regimen as prescribed and report any side effects. Patient  will contact this office, call 911 or present to the nearest emergency room should suicidal or homicidal ideations occur. Provide Cognitive Behavioral Therapy and Solution Focused Therapy to improve functioning, maintain stability, and avoid decompensation and the need for higher level of care.     Return in about 3 weeks (around 8/12/2024).      VISIT DIAGNOSIS:    Diagnosis Plan   1. Generalized anxiety disorder        2. Major depressive disorder, recurrent episode, moderate         09:01 EDT       This document has been electronically signed by EDWIN Hastings  July 25, 2024      Part of this note may be an electronic transcription/translation of spoken language to printed text using the Dragon Dictation System.

## 2024-08-14 ENCOUNTER — TELEMEDICINE (OUTPATIENT)
Dept: PSYCHIATRY | Facility: CLINIC | Age: 54
End: 2024-08-14
Payer: COMMERCIAL

## 2024-08-14 DIAGNOSIS — F41.1 GENERALIZED ANXIETY DISORDER: Primary | ICD-10-CM

## 2024-08-14 DIAGNOSIS — F33.1 MAJOR DEPRESSIVE DISORDER, RECURRENT EPISODE, MODERATE: ICD-10-CM

## 2024-08-14 NOTE — PROGRESS NOTES
Date: August 15, 2024  Time In: 9:04  Time out: 9:58      This provider is located at the [facility] ([facility]), [address], [place] [address] using a secure [employer] Video Visit through [employer]. Patient is being seen remotely via telehealth at home address in [place] and stated they are in a secure environment for this session. The patient's condition being diagnosed/treated is appropriate for telemedicine. The provider identified herself as well as her credentials. The patient, and/or patients guardian, consent to be seen remotely, and when consent is given they understand that the consent allows for patient identifiable information to be sent to a third party as needed. They may refuse to be seen remotely at any time. The electronic data is encrypted and password protected, and the patient and/or guardian has been advised of the potential risks to privacy not withstanding such measures.     You have chosen to receive care through a telehealth visit.  Do you consent to use a video/audio connection for your medical care today? Yes    Subjective   Nicki Fowler is a 54 y.o. female who presents today fully oriented, appropriately dressed and groomed, and open to communication for follow up appointment.    Chief Complaint:   Chief Complaint   Patient presents with    Anxiety    Depression        History of Present Illness: Rapport was established through conversation and unconditional positive regard. Recent events were discussed and how these events impact Pt's emotional health.   Pt reports successful use of learned coping skills 8 out of 10 times since last report.  Pt reports continuation of symptoms and states the intensity and duration of symptoms has remained unchanged since last report. Pt rates anxiety at 1/10 and depression at 0/10.     Sleep: Pt reports sleep has remained unchanged since last report. Healthy sleep habits were discussed such as  "maintaining a schedule, routine, avoiding caffeine, and limiting screen time before bed.  Sleep is \"off and on\"  Last few days she has been sleeping really good.  States she had a few days of sleeplessness and Pt states this is likely due to not hearing from son as much.  States she has a habit of worrying about him when she does not hear from him due to hx of him disappearing when he was not doing well.     Appetite:  Pt reports appetite has been good since last report.  Discussed the importance of hydration and eating a healthy diet for overall and mental health.    Medication compliance: Pt reports medications are being taken daily as prescribed. Discussed the importance of compliance with prescriber's directions and Pt was instructed to report questions/concerns, as well as missed doses or discontinuation of medication by Pt.     Safety Plan in Place: No Pt denies SI/HI/SIB recent or current    Daily Functioning:  Since last report, Pt states symptoms are causing Mild difficulty in daily functioning.      Subjective Report and Content Discussion:  Pt  states she put her adult son who has mental health issues on a budget and she now sends him money 2x monthly.  States she feels more empowered over the situation. States work is \"OK\" and Pt is very stressed at work and states she has not been able to take off for a mini vacation.  States dionisio is an  and Pt and dionisio were assisting their son during tax season and dionisio is recovering from the stress, and this is the reason they have not made vacation plans. States they went ton an overnight dinner and stayed in a hotel a few weeks ago. Reports communication issues in her marriage has been \"put on the back burner\" due to Pt dealing with son's issues.  Pt was allowed to verbally process some feelings of frustration about 's lack of communication and interaction.  Pt's feelings were normalized and validated.  Pt will write down specific questions and " areas of concern for next session.     Reviewed coping skills and encouraged Pt to continue to practicing coping skills daily when not under distress. Pt was praised for their attempts to decrease symptoms and mitigate activating events.    Clinical Maneuvering/Intervention:  CBT was utilized to encourage Pt to identify maladaptive thoughts and behaviors and replace with more affirming and positive.Pt encouraged to set and maintain appropriate and healthy boundaries with others. Pt was instructed to practice daily using appropriate and specific words to communicate feelings to others.  Motivational interviewing used to encourage Pt to identify strengths which can be utilized in working toward treatment goals. Pt encouraged to practice daily learned skills such as controlled breathing, grounding, and mindfulness. Pt was encouraged to ask for help from support persons to assist them in maintaining stability and alleviate symptoms. Discussed the importance of being one's own mental health advocate and to refrain from seeing the need to ask for help as a weakness. Pt was encouraged to formulate a plan of action to be more proactive in managing stressors and refrain from using reactive and automatic heightened emotional responses.     Solution-focused therapy employed to identify how Pt would like their life to be if they were to make positive changes. Pt encouraged to identify effective coping skills and strengths they can use to continue utilizing those skills. Pt encouraged to discontinue utilizing non-effective coping mechanisms. Pt provided with feedback to highlight achievements and personal and other resources. Encouraged use of SMART goals    Assisted patient in processing above session content; acknowledged and normalized patient’s thoughts, feelings, and concerns.  Rationalized patient thought process regarding concerns presented at session.  Discussed triggers associated with patient's  anxiety  and depression   Also discussed coping skills for patient to implement such as increasing activity , self care , and positive self talk     Allowed patient to freely discuss issues without interruption or judgment. Provided safe, confidential environment to facilitate the development of positive therapeutic relationship and encourage open, honest communication. Assisted patient in identifying risk factors which would indicate the need for higher level of care including thoughts to harm self or others and/or self-harming behavior and encouraged patient to contact this office, call 911, or present to the nearest emergency room should any of these events occur. Discussed crisis intervention services and means to access. Patient adamantly and convincingly denies current suicidal or homicidal ideation or perceptual disturbance.    Assessment:     Patient appears to maintain relative stability as compared to their baseline.  However, patient persistently struggles with symptoms which continue to cause impairment in important areas of functioning.  As a result, they can be reasonably expected to continue to benefit from treatment and would likely be at increased risk for decompensation otherwise.      PHQ-Score Total:  PHQ-9 Total Score:  Not assessed    EDITH-7 Score Total:         Mental Status Exam:   Hygiene:   good  Cooperation:  Cooperative  Eye Contact:  Good  Psychomotor Behavior:  Appropriate  Affect:  Full range  Mood: normal  Speech:  Normal  Thought Process:  Goal directed  Thought Content:  Normal  Suicidal:  None  Homicidal: None  Hallucinations:  None  Delusion: None  Memory:  Intact  Orientation:  Grossly Intact  Reliability:  good  Insight:  Good  Judgement:  Good  Impulse Control:  Good  Physical/Medical Issues:  No        Functional Status: Mild impairment    Progress toward goal: Not at goal    Prognosis: Good with continued therapeutic intervention        Plan:    Patient will continue in individual outpatient therapy  with focus on improved functioning and coping skills, maintaining stability, and avoiding decompensation and the need for higher level of care.    Patient will adhere to medication regimen as prescribed and report any side effects. Patient will contact this office, call 911 or present to the nearest emergency room should suicidal or homicidal ideations occur. Provide Cognitive Behavioral Therapy and Solution Focused Therapy to improve functioning, maintain stability, and avoid decompensation and the need for higher level of care.     Return in about 2 weeks (around 8/28/2024).      VISIT DIAGNOSIS:    Diagnosis Plan   1. Generalized anxiety disorder        2. Major depressive disorder, recurrent episode, moderate         09:03 EDT       This document has been electronically signed by EDWIN Hastings  August 15, 2024      Part of this note may be an electronic transcription/translation of spoken language to printed text using the Dragon Dictation System.

## 2024-09-17 ENCOUNTER — TELEMEDICINE (OUTPATIENT)
Dept: PSYCHIATRY | Facility: CLINIC | Age: 54
End: 2024-09-17
Payer: COMMERCIAL

## 2024-09-17 DIAGNOSIS — F33.1 MAJOR DEPRESSIVE DISORDER, RECURRENT EPISODE, MODERATE: ICD-10-CM

## 2024-09-17 DIAGNOSIS — F41.1 GENERALIZED ANXIETY DISORDER: Primary | ICD-10-CM

## 2024-09-17 PROCEDURE — 90837 PSYTX W PT 60 MINUTES: CPT | Performed by: COUNSELOR

## 2024-10-01 ENCOUNTER — TELEMEDICINE (OUTPATIENT)
Dept: PSYCHIATRY | Facility: CLINIC | Age: 54
End: 2024-10-01
Payer: COMMERCIAL

## 2024-10-01 DIAGNOSIS — F41.1 GENERALIZED ANXIETY DISORDER: Primary | ICD-10-CM

## 2024-10-01 NOTE — PROGRESS NOTES
Date: October 1, 2024  Time In: 8:03  Time out: 8:58      This provider is located at the [facility]), [address], [place] [address] using a secure [employer] Video Visit through [employer]. Patient is being seen remotely via telehealth at home address in [place] and stated they are in a secure environment for this session. The patient's condition being diagnosed/treated is appropriate for telemedicine. The provider identified herself as well as her credentials. The patient, and/or patients guardian, consent to be seen remotely, and when consent is given they understand that the consent allows for patient identifiable information to be sent to a third party as needed. They may refuse to be seen remotely at any time. The electronic data is encrypted and password protected, and the patient and/or guardian has been advised of the potential risks to privacy not withstanding such measures.     You have chosen to receive care through a telehealth visit.  Do you consent to use a video/audio connection for your medical care today? Yes    Subjective   Nicki Fowler is a 54 y.o. female who presents today fully oriented, appropriately dressed and groomed, and open to communication for follow up appointment.    Chief Complaint:   Chief Complaint   Patient presents with    Anxiety        History of Present Illness: Rapport was established through conversation and unconditional positive regard. Recent events were discussed and how these events impact Pt's emotional health.   Pt reports successful use of learned coping skills 6 out of 10 times since last report.  Pt reports continuation of symptoms and states the intensity and duration of symptoms has remained unchanged since last report. Pt rates anxiety at 1/10 and depression at 1/10.     Sleep: Pt reports sleep has remained unchanged since last report. Healthy sleep habits were discussed such as maintaining a  "schedule, routine, avoiding caffeine, and limiting screen time before bed.    Appetite:  Pt reports appetite has been good since last report.  Discussed the importance of hydration and eating a healthy diet for overall and mental health.    Medication compliance: Pt reports medications are being taken daily as prescribed. Discussed the importance of compliance with prescriber's directions and Pt was instructed to report questions/concerns, as well as missed doses or discontinuation of medication by Pt.     Safety Plan in Place: No Pt denies SI/HI/SIB recent or current    Daily Functioning:  Since last report, Pt states symptoms are causing Mild difficulty in daily functioning.      Content Discussion:   Pt reports life updates since previous session. Pt identified current stressors. Pt acknowledged stressors within and outside of one's control. Pt identified successes and issues with utilizing coping mechanisms.   When Pt was 11 a cousin killed his father (Pt's dad's brother).  Murders occurred the night before Pt's 11th birthday.    He killed them and then hid the bodies.  Cousin was 16-17 still in high school.   Pt had to stay with friend for 2-3 weeks. Pt was allowed to verbally process feelings of anxiety and feeling left out of things in childhood \"I was never told anything.\"  Identified a pattern of this up to adulthood. Endorses feeling like an afterthought in her family.  Mom wanted her to be a \"girlie girl\" bc Pt was only girl after 3 older brothers.  States she continues to hold on to some resentment for taking care of Mom and is was expected of her as the only girl.    Counselor supported Pt in continued exploration of underlying belief systems which contribute to difficulty in connecting/vulnerability.  Through discussion, Pt identifies themes of preservation and overt emotional and physical reactivity when physical and/or emotional statis is in question, even in low or perceived threatening situations. " Counselor supported Pt in identifying past experiences and underlying beliefs which contribute to these feelings and reactions.  Pt was supported and encouraged in processing emotions related to frustrations with the expectations of self and others.  Pt was encouraged to identify cultural and nuclear familial contexts which contribute to these concerns.  Pt was receptive and engaged in conversation, and Pt reports this was beneficial and applicable to their situation.      Reviewed coping skills and encouraged Pt to continue to practicing coping skills daily when not under distress. Pt was praised for their attempts to decrease symptoms and mitigate activating events.    Clinical Maneuvering/Intervention:  CBT was utilized to encourage Pt to identify maladaptive thoughts and behaviors and replace with more affirming and positive.Pt encouraged to set and maintain appropriate and healthy boundaries with others. Pt was instructed to practice daily using appropriate and specific words to communicate feelings to others.  Motivational interviewing used to encourage Pt to identify strengths which can be utilized in working toward treatment goals. Pt encouraged to practice daily learned skills such as controlled breathing, grounding, and mindfulness. Pt was encouraged to ask for help from support persons to assist them in maintaining stability and alleviate symptoms. Discussed the importance of being one's own mental health advocate and to refrain from seeing the need to ask for help as a weakness. Pt was encouraged to formulate a plan of action to be more proactive in managing stressors and refrain from using reactive and automatic heightened emotional responses.     Solution-focused therapy employed to identify how Pt would like their life to be if they were to make positive changes. Pt encouraged to identify effective coping skills and strengths they can use to continue utilizing those skills. Pt encouraged to  discontinue utilizing non-effective coping mechanisms. Pt provided with feedback to highlight achievements and personal and other resources. Encouraged use of SMART goals    Assisted patient in processing above session content; acknowledged and normalized patient’s thoughts, feelings, and concerns.  Rationalized patient thought process regarding concerns presented at session.  Discussed triggers associated with patient's  anxiety  and depression  Also discussed coping skills for patient to implement such as increasing activity , self care , and positive self talk     Allowed patient to freely discuss issues without interruption or judgment. Provided safe, confidential environment to facilitate the development of positive therapeutic relationship and encourage open, honest communication. Assisted patient in identifying risk factors which would indicate the need for higher level of care including thoughts to harm self or others and/or self-harming behavior and encouraged patient to contact this office, call 911, or present to the nearest emergency room should any of these events occur. Discussed crisis intervention services and means to access. Patient adamantly and convincingly denies current suicidal or homicidal ideation or perceptual disturbance.    Assessment:     Patient appears to maintain relative stability as compared to their baseline.  However, patient persistently struggles with symptoms which continue to cause impairment in important areas of functioning.  As a result, they can be reasonably expected to continue to benefit from treatment and would likely be at increased risk for decompensation otherwise.      PHQ-Score Total:  PHQ-9 Total Score: 9    EDITH-7 Score Total:         Mental Status Exam:   Hygiene:   good  Cooperation:  Cooperative  Eye Contact:  Good  Psychomotor Behavior:  Appropriate  Affect:  Full range  Mood: normal  Speech:  Normal  Thought Process:  Goal directed  Thought Content:   Normal  Suicidal:  None  Homicidal: None  Hallucinations:  None  Delusion: None  Memory:  Intact  Orientation:  Grossly Intact  Reliability:  good  Insight:  Good  Judgement:  Good  Impulse Control:  Good  Physical/Medical Issues:  No        Functional Status: Mild impairment     Progress toward goal: Not at goal    Prognosis: Good with continued therapeutic intervention        Plan:    Patient will continue in individual outpatient therapy with focus on improved functioning and coping skills, maintaining stability, and avoiding decompensation and the need for higher level of care.    Patient will adhere to medication regimen as prescribed and report any side effects. Patient will contact this office, call 911 or present to the nearest emergency room should suicidal or homicidal ideations occur. Provide Cognitive Behavioral Therapy and Solution Focused Therapy to improve functioning, maintain stability, and avoid decompensation and the need for higher level of care.     Return in about 3 weeks (around 10/22/2024).      VISIT DIAGNOSIS:    Diagnosis Plan   1. Generalized anxiety disorder         08:02 EDT       This document has been electronically signed by EDWIN Hastings  October 1, 2024      Part of this note may be an electronic transcription/translation of spoken language to printed text using the Dragon Dictation System.

## 2024-10-04 DIAGNOSIS — F41.1 GENERALIZED ANXIETY DISORDER WITH PANIC ATTACKS: ICD-10-CM

## 2024-10-04 DIAGNOSIS — F33.1 MODERATE EPISODE OF RECURRENT MAJOR DEPRESSIVE DISORDER: ICD-10-CM

## 2024-10-04 DIAGNOSIS — F41.0 GENERALIZED ANXIETY DISORDER WITH PANIC ATTACKS: ICD-10-CM

## 2024-10-04 RX ORDER — BUPROPION HYDROCHLORIDE 150 MG/1
150 TABLET ORAL EVERY MORNING
Qty: 90 TABLET | Refills: 1 | Status: SHIPPED | OUTPATIENT
Start: 2024-10-04

## 2024-10-08 ENCOUNTER — TELEMEDICINE (OUTPATIENT)
Dept: BEHAVIORAL HEALTH | Facility: CLINIC | Age: 54
End: 2024-10-08
Payer: COMMERCIAL

## 2024-10-08 DIAGNOSIS — F41.1 GENERALIZED ANXIETY DISORDER WITH PANIC ATTACKS: ICD-10-CM

## 2024-10-08 DIAGNOSIS — F33.42 RECURRENT MAJOR DEPRESSIVE DISORDER, IN FULL REMISSION: Primary | ICD-10-CM

## 2024-10-08 DIAGNOSIS — F41.0 GENERALIZED ANXIETY DISORDER WITH PANIC ATTACKS: ICD-10-CM

## 2024-10-08 PROCEDURE — 99214 OFFICE O/P EST MOD 30 MIN: CPT

## 2024-10-08 PROCEDURE — 96127 BRIEF EMOTIONAL/BEHAV ASSMT: CPT

## 2024-10-08 RX ORDER — CHLORAL HYDRATE 500 MG
CAPSULE ORAL
COMMUNITY

## 2024-10-08 RX ORDER — CITALOPRAM HYDROBROMIDE 20 MG/1
20 TABLET ORAL DAILY
Qty: 90 TABLET | Refills: 1 | Status: SHIPPED | OUTPATIENT
Start: 2024-10-08

## 2024-10-08 NOTE — PATIENT INSTRUCTIONS
GENERAL NEW PATIENT INSTRUCTIONS:    -Please arrive in person or virtually 10-15 minutes prior to appointment to allow for registration/sign in/questionnaires. If you are seen virtually please review after visit summary (AVS)/patient instructions via [employer] for a summary of plan of care/changes in treatment plan. If you are having difficulties logging on or accessing Kamida please contact my office for assistance.    -The best way to get a hold of Todd is to call the office at [phone] and ask to leave a message with his medical assistant. Todd Burgess is a Psychiatric Mental Health Nurse Practitioner, due to his specialty patient's are not able to message him directly via [employer]. Please give my office up to 48 hours to respond to a patient call/question/refill request. Refill requests will be made during normal office hours only, Monday-Friday 8:00-5:30.      -Todd is out of the office on Fridays and weekends. Tuesdays and Thursdays are Todd's in-office days, Mondays and Wednesdays are his telehealth days.  The decision to be seen virtually or in person is up to the discretion of the provider, not all behavioral health problems are appropriate for telehealth.    -Please call the office at [phone] with any worsening of symptoms or onset of intolerable side effects.  -Follow-up appointments must be maintained in order for prescribing to continue.  -Patient has been educated regarding multimodal approach with healthy nutrition, healthy sleep, regular physical activity, social activities, counseling, and medications.   -Please call 911 or go to the nearest ER if you begin to have thoughts of harming yourself or other people.    No show policy:  We understand unexpected circumstances arise; however, anytime you miss your appointment we are unable to provide you appropriate care.  In addition, each appointment missed could have been used to provide care for others.  We ask that you call at least 24 hours in  advance to cancel or reschedule an appointment. We would like to take this opportunity to remind you of our policy stating patients who miss THREE or more appointments without cancelling or rescheduling 24 hours in advance of the appointment may be subject to cancellation of any further visits with our clinic and recommendation to seek in-person services/visits. Please call [phone] to reschedule your appointment. If there are reasons that make it difficult for you to keep the appointments, please call and let us know how we can help. Please understand that medication prescribing will not continue without seeing your provider.

## 2024-10-08 NOTE — PROGRESS NOTES
Patient assessed today via MyChart through [employer] pt is IN HER CAR in a secure environment and verbalizes privacy during interview. JAMES Brooks is at home in a secure environment using a secure laptop.The patient's condition being diagnosed/treated is appropriate for telemedicine.The provider identified himself as well as his credentials.The patient, and/or patient's guardian, consent to be seen remotely, and when consent is given they understand that the consent allows for patient identifiable information to be sent to a third party as needed. They may refuse to be seen remotely at any time. The electronic data is encrypted and password protected, and the patient and/or guardian has been advised of the potential risks to privacy not withstanding such measures.    Subjective     Chief Complaint   Patient presents with    Depression    Anxiety     HPI:  Nicki Fowler, 54 y.o. pt presents to [facility] on 10/08/2024 for F/U, pt seen today for psychiatric med management of Depression and Anxiety . Pt last seen roughly 6 and prior, no medication changes at that time.    Since then patient reports things are pretty good is not happy with apparent weight gain has been going on over the past year reports she believes it may be a combination of stress with menopause.  Patient training for a new marathon in December completed the ultramarathon over the summer, patient has an adult son who has employment issues they are allowing him to stay in their condo while he gets back on his feet.    Patient reports daily compliance with Wellbutrin and Celexa reports the addition of Wellbutrin has been very helpful overall, continues to see a Leonie Suarez biweekly, patient reports she is sleeping much better, patient continues to take TCA for IBS prescribed by outside provider.    It was agreed to continue Wellbutrin and Celexa at current doses and follow-up in 3 to 6 months, patient chose to follow-up in 6 months or sooner if  needed, refill for Celexa sent to pharmacy.            Social Status:  Ethnic Group: Not  Or   Race: White Or   Marital Status:    Employment status: Full Time [employer]       SUBSTANCE/SEXUAL HISTORY:  Social History     Socioeconomic History    Marital status:      Spouse name: Bob    Number of children: 3    Years of education: BA    Highest education level: Bachelor's degree (e.g., BA, AB, BS)   Tobacco Use    Smoking status: Former     Current packs/day: 0.00     Average packs/day: 2.2 packs/day for 33.3 years (74.9 ttl pk-yrs)     Types: Cigarettes     Start date: 1983     Quit date: 2008     Years since quittin.7    Smokeless tobacco: Never    Tobacco comments:     stopped 5 years for pregnancies   Vaping Use    Vaping status: Never Used   Substance and Sexual Activity    Alcohol use: Not Currently     Alcohol/week: 2.0 standard drinks of alcohol     Comment: Social    Drug use: No    Sexual activity: Yes     Partners: Male     Birth control/protection: Vasectomy     Social History     Social History Narrative    Patient works for [employer], reports it is a Monday through Friday 9-5 type hours, highest level of education is a bachelor's degree, patient lives with spouse whose name is Bob and youngest son Reji Guerrero.  Patient has a 26-year-old daughter named Nusrat, oldest son is Sher age 24.      Past Medical History:   Diagnosis Date    Allergic 1980    seasonal, foods, shellfish    Anxiety and depression     Arthritis     Cervical disc disorder     Closed fracture of coccyx 2013    Colon polyp 2018    Dr. Mares removed during colonoscopy    Dizziness     constant x 3 weeks    Headache     History of seasonal allergies     Hyperlipidemia     IBS (irritable bowel syndrome)     Lactose intolerance early     dairy causes bloating and gas    Low back pain     Lumbosacral disc disease     Meniere's disease     Peripheral  neuropathy       Past Medical History Pertinent Negatives:   Diagnosis Date Noted    AAA (abdominal aortic aneurysm) 12/08/2022    Abnormal ECG 06/19/2018    Alcoholism 06/19/2018    Anemia 06/19/2018    Aneurysm 06/19/2018    Arteriovenous malformation 06/19/2018    Asthma 06/19/2018    Simons esophagus 12/08/2022    Bleeding disorder 06/19/2018    Brain concussion 06/19/2018    Cancer 06/19/2018    Carotid artery occlusion 06/19/2018    Celiac disease 12/08/2022    CHF (congestive heart failure) 06/19/2018    Cholelithiasis 12/08/2022    Chronic kidney disease 06/19/2018    Cirrhosis 06/19/2018    Claustrophobia 06/19/2018    Clotting disorder 06/19/2018    Colon cancer 12/08/2022    COPD (chronic obstructive pulmonary disease) 06/19/2018    Coronary artery disease 06/19/2018    Crohn's disease 12/08/2022    CTS (carpal tunnel syndrome) 06/19/2018    Deep vein thrombosis 06/19/2018    Diabetes mellitus 06/19/2018    Disease of thyroid gland 04/20/2023    Diverticulitis of colon 12/08/2022    Esophageal varices 12/08/2022    Fatty liver 12/08/2022    GERD (gastroesophageal reflux disease) 12/08/2022    GI (gastrointestinal bleed) 12/08/2022    Gout 06/19/2018    Hard to intubate 04/30/2018    Head injury 04/20/2023    Hernia 12/08/2022    History of transfusion 06/19/2018    HIV disease 06/19/2018    Hypertension 06/19/2018    Infectious viral hepatitis 06/19/2018    Liver disease 06/19/2018    Malignant hyperthermia due to anesthesia 04/30/2018    MRSA (methicillin resistant Staphylococcus aureus) 06/19/2018    Myocardial infarction 06/19/2018    Pancreatitis 12/08/2022    PONV (postoperative nausea and vomiting) 04/30/2018    Pulmonary arterial hypertension 06/19/2018    Renal insufficiency 04/20/2023    Seizures 06/19/2018    Sickle cell anemia 06/19/2018    Spinal headache 04/30/2018    Stroke 06/19/2018    Substance abuse 06/19/2018    Thoracic disc disorder 06/19/2018    TIA (transient ischemic attack)  06/19/2018    Ulcer 12/08/2022    Ulcerative colitis 12/08/2022     Review of Systems   Constitutional: Negative.  Negative for chills and fever.   Respiratory:  Negative for chest tightness and shortness of breath.    Cardiovascular:  Negative for chest pain.   Gastrointestinal:  Positive for GERD. Negative for nausea and vomiting.   Neurological:  Negative for dizziness and light-headedness.   Psychiatric/Behavioral:  Positive for sleep disturbance and stress. Negative for suicidal ideas.      Objective   Physical Exam  Constitutional:       Appearance: Normal appearance.   HENT:      Head: Normocephalic.   Pulmonary:      Effort: Pulmonary effort is normal.   Skin:     General: Skin is dry.   Neurological:      Mental Status: she is alert and oriented to person, place, and time.     PHQ-9 Depression Screening  Little interest or pleasure in doing things? 1-->several days   Feeling down, depressed, or hopeless? 1-->several days   Trouble falling or staying asleep, or sleeping too much? 0-->not at all   Feeling tired or having little energy?     Poor appetite or overeating? 0-->not at all   Feeling bad about yourself/you are a failure/have let yourself or your family down? 0-->not at all   Trouble concentrating on things? 2-->more than half the days   Psychomotor agitation/retardation 0-->not at all   Thoughts about death/dying/suicide 0-->not at all   PHQ-9 Total Score 6   How difficult have these problems for you? somewhat difficult     GAD7 Documentation:  Feeling nervous, anxious or on edge 0   Not being able to stop or control worrying 0   Worrying too much about different things 0   Trouble relaxing 1   Being so restless that it is hard to sit still 1   Becoming easily annoyed or irritable 0   Feeling Afraid as if something awful might happen 0   EDITH Total Score 2   How difficult have these problems made it for you? Somewhat difficult     MENTAL STATUS EXAM   General Appearance:  Cleanly groomed and  dressed  Eye Contact:  Good eye contact  Attitude:  Cooperative  Speech:  Normal rate, tone, volume  Language:  Spontaneous  Mood and affect:  Normal, pleasant  Thought Process:  Logical  Associations/ Thought Content:  No delusions  Hallucinations:  None  Suicidal Ideations:  Not present  Sensorium:  Alert  Orientation:  Person, place, time and situation  Attention Span/ Concentration:  Good  Fund of Knowledge:  Appropriate for age and educational level  Intellectual Functioning:  Average range  Insight:  Good  Judgement:  Good  Reliability:  Good      LABS:  CBC          3/29/2024    10:03   CBC   WBC 3.85    RBC 4.30    Hemoglobin 12.9    Hematocrit 40.2    MCV 93.5    MCH 30.0    MCHC 32.1    RDW 12.5    Platelets 255         CMP          3/29/2024    10:03   CMP   Glucose 80    BUN 11    Creatinine 0.80    Sodium 139    Potassium 3.8    Chloride 100    Calcium 9.4    Total Protein 6.8    Albumin 4.4    Globulin 2.4    Total Bilirubin 0.4    Alkaline Phosphatase 96    AST (SGOT) 28    ALT (SGPT) 16    BUN/Creatinine Ratio 13.8         Allergies   Allergen Reactions    Cinnamon Other (See Comments) and Shortness Of Breath    Shellfish-Derived Products Hives     Current Outpatient Medications   Medication Instructions    ALPRAZolam (XANAX) 0.25 mg, Oral, 2 Times Daily PRN    buPROPion XL (WELLBUTRIN XL) 150 mg, Oral, Every Morning    cetirizine (ZYRTEC) 10 mg, Oral, Daily    citalopram (CELEXA) 20 mg, Oral, Daily    FIBER PO Oral, Every Morning    nortriptyline (PAMELOR) 20 mg, Oral, Nightly    Omega-3 Fatty Acids (fish oil) 1000 MG capsule capsule Oral, Daily With Breakfast    pantoprazole (PROTONIX) 40 mg, Oral, Daily    triamterene-hydrochlorothiazide (MAXZIDE-25) 37.5-25 MG per tablet      Assessment    ASSESSMENT/TREATMENT PLAN/INSTRUCTIONS/EDUCATION    (F33.42) Recurrent major depressive disorder, in full remission - Plan: citalopram (CeleXA) 20 MG tablet    (F41.1,  F41.0) Generalized anxiety disorder with  panic attacks - Plan: citalopram (CeleXA) 20 MG tablet     FOLLOW UP: Return in about 6 months (around 4/8/2025) for Recheck.    ADDITIONAL MONITORING:  -Narc Contract: 4/23  -PDMP via EMR interface reviewed 10/08/2024  -UDS: random     -Patient has been educated on the risk versus benefit of being prescribed a controlled substance, patient has been educated on additional monitoring that is required including [place] reports, random urine drug screens, and pill counts.  Narcotics contract has been read/signed by patient and provider, renewed yearly.      -Controlled Substance Instructions:  Patient has been educated on additional monitoring that is required including regular follow up appointments, CORBY reports, random urine drug screens, and random pill counts.    Patient has read/reviewed controlled substance agreement and understands this must be renewed yearly.  Patient educated that continuation of a controlled substance is at the discretion of the provider.     Patient educated that prescribing will not continue unless follow-up appointments are maintained.  Prescriptions can only be sent to a [place] pharmacy, 30 day supply with limited number of refills. Refill requests will only be made/reviewed during normal business hours.   Patient has been educated on the risk versus benefit of being prescribed a controlled substance including dependence/tolerance/abuse/addiction/diversion.  Patient has been educated on non-controlled substance alternatives that carry with them much less risk and require less monitoring.  If the patient is unwilling or unable to comply with the above listed monitoring the controlled substance will no longer be prescribed.     Medications Discontinued During This Encounter   Medication Reason    citalopram (CeleXA) 20 MG tablet Reorder       New Medications Ordered This Visit   Medications    citalopram (CeleXA) 20 MG tablet     Sig: Take 1 tablet by mouth Daily.     Dispense:  90 tablet      Refill:  1          No orders of the defined types were placed in this encounter.    -Barriers: stress  -Strengths:  motivated  and positive attitude    -Short-Term Goals: Pt will be compliant with medication management and note improvement in S/S over the next 4 to 6 weeks or at next scheduled visit.  -Long-Term Goals: Pt will be compliant with the agreed treatment plan including medication regimen & F/U appt's and deny impairment in daily functioning over the next 6 months.      -Progress toward goal: At goal  -Functional Status: No impairment  -Prognosis: Good with Ongoing Treatment        SUMMARY/DISCUSSION:  Pt past social/medical/family history reviewed/updated. ROS conducted, medications/allergies, reviewed, patient was screened today for depression/anxiety, PHQ/EDITH scores reviewed.  Most recent vitals/labs reviewed. Pt was given appropriate time to ask questions and concerns were addressed. A thorough discussion was had that included review of disease process, need for continued monitoring and additional treatment options including use of pharmacological and non-pharmacological approaches to care, decisions were made and agreed upon by patient and provider. Discussed the risks, benefits, and potential side effects of the medications; patient ackowledged and verbally consented.     -Please call the office at [phone] with any worsening of symptoms or onset of intolerable side effects, please ask to leave a message with Todd's medical assistant.  Please give my office up to 48 hours to respond to a patient call/question/refill request.  -Patient is agreeable to call 911 or go to the nearest ER should he/she/they have any thoughts of harm to self or others.  -Patient has been educated on providers schedule, contact information, and patient/provider expectations.   -Patient has been educated regarding multimodal approach with healthy nutrition, healthy sleep, regular physical activity, social activities,  counseling, and medications.     Part of this note may be an electronic transcription/translation of spoken language to printed text using the Dragon Dictation System. Some of the data in this electronic note has been brought forward from a previous encounter, any necessary changes have been made, it has been reviewed by this APRN, and it is accurate.    This document has been electronically signed by JAYDA Hays October 8, 2024 09:19 EDT

## 2024-10-15 ENCOUNTER — TELEMEDICINE (OUTPATIENT)
Dept: PSYCHIATRY | Facility: CLINIC | Age: 54
End: 2024-10-15
Payer: COMMERCIAL

## 2024-10-15 DIAGNOSIS — F33.1 MAJOR DEPRESSIVE DISORDER, RECURRENT EPISODE, MODERATE: ICD-10-CM

## 2024-10-15 DIAGNOSIS — F41.1 GENERALIZED ANXIETY DISORDER: Primary | ICD-10-CM

## 2024-10-15 NOTE — PROGRESS NOTES
Date: October 16, 2024  Time In: 8:02  Time out: 8:56      This provider is located at the [facility] [address], [place] [address] using a secure "[employer]hart Video Visit through [employer]. Patient is being seen remotely via telehealth at home address in [place] and stated they are in a secure environment for this session. The patient's condition being diagnosed/treated is appropriate for telemedicine. The provider identified herself as well as her credentials. The patient, and/or patients guardian, consent to be seen remotely, and when consent is given they understand that the consent allows for patient identifiable information to be sent to a third party as needed. They may refuse to be seen remotely at any time. The electronic data is encrypted and password protected, and the patient and/or guardian has been advised of the potential risks to privacy not withstanding such measures.     You have chosen to receive care through a telehealth visit.  Do you consent to use a video/audio connection for your medical care today? Yes    Subjective   Nicki Fowler is a 54 y.o. female who presents today fully oriented, appropriately dressed and groomed, and open to communication for follow up appointment.    Chief Complaint:   Chief Complaint   Patient presents with    Anxiety    Depression        History of Present Illness: Rapport was established through conversation and unconditional positive regard. Recent events were discussed and how these events impact Pt's emotional health.   Pt reports successful use of learned coping skills 7 out of 10 times since last report.  Pt reports continuation of symptoms and states the intensity and duration of symptoms has remained unchanged since last report. Pt rates anxiety at 0/10 and depression at 1/10.     Sleep: Pt reports sleep has remained unchanged since last report. Healthy sleep habits were discussed such as  "maintaining a schedule, routine, avoiding caffeine, and limiting screen time before bed.    Appetite:  Pt reports appetite has been good since last report.  Discussed the importance of hydration and eating a healthy diet for overall and mental health.    Medication compliance: Pt reports medications are being taken daily as prescribed. Discussed the importance of compliance with prescriber's directions and Pt was instructed to report questions/concerns, as well as missed doses or discontinuation of medication by Pt.     Safety Plan in Place: No Pt denies SI/HI/SIB recent or current    Daily Functioning:  Since last report, Pt states symptoms are causing Mild difficulty in daily functioning.      Content Discussion:   Pt reports life updates since previous session. Pt identified current stressors. Pt acknowledged stressors within and outside of one's control. Pt identified successes and issues with utilizing coping mechanisms.  Pt states she feels \"just tired\" and this refers to being \"blah\" and having to make all the decisions around her home and in her friendships.  States she went on a short trip with her friend and Pt had to make all of the decisions of where to eat and what activities they did, and Pt states this was exhausting and took away from Pt's fun experience. Pt states her brother, family, and work all require her to make many decisions \"constantly,\" and Pt states she would like to have breaks from this.  Discussed ways Pt can set boundaries with family and others by insisting she have a specific \"down time\" without interruption.  Pt agrees this will be helpful.  Pt will implement this and report findings at next session.    Counselor supported Pt in continued exploration of underlying belief systems which contribute to difficulty in connecting/vulnerability.  Through discussion, Pt identifies themes of preservation and overt emotional and physical reactivity when physical and/or emotional statis is in " question, even in low or perceived threatening situations. Counselor supported Pt in identifying past experiences and underlying beliefs which contribute to these feelings and reactions.  Pt was supported and encouraged in processing emotions related to frustrations with the expectations of self and others.  Pt was encouraged to identify cultural and nuclear familial contexts which contribute to these concerns.  Pt was receptive and engaged in conversation, and Pt reports this was beneficial and applicable to their situation.      Reviewed coping skills and encouraged Pt to continue to practicing coping skills daily when not under distress. Pt was praised for their attempts to decrease symptoms and mitigate activating events.    Clinical Maneuvering/Intervention:  CBT was utilized to encourage Pt to identify maladaptive thoughts and behaviors and replace with more affirming and positive.Pt encouraged to set and maintain appropriate and healthy boundaries with others. Pt was instructed to practice daily using appropriate and specific words to communicate feelings to others.  Motivational interviewing used to encourage Pt to identify strengths which can be utilized in working toward treatment goals. Pt encouraged to practice daily learned skills such as controlled breathing, grounding, and mindfulness. Pt was encouraged to ask for help from support persons to assist them in maintaining stability and alleviate symptoms. Discussed the importance of being one's own mental health advocate and to refrain from seeing the need to ask for help as a weakness. Pt was encouraged to formulate a plan of action to be more proactive in managing stressors and refrain from using reactive and automatic heightened emotional responses.     Solution-focused therapy employed to identify how Pt would like their life to be if they were to make positive changes. Pt encouraged to identify effective coping skills and strengths they can use to  continue utilizing those skills. Pt encouraged to discontinue utilizing non-effective coping mechanisms. Pt provided with feedback to highlight achievements and personal and other resources. Encouraged use of SMART goals    Assisted patient in processing above session content; acknowledged and normalized patient’s thoughts, feelings, and concerns.  Rationalized patient thought process regarding concerns presented at session.  Discussed triggers associated with patient's  anxiety  and depression  Also discussed coping skills for patient to implement such as increasing activity , self care , and positive self talk     Allowed patient to freely discuss issues without interruption or judgment. Provided safe, confidential environment to facilitate the development of positive therapeutic relationship and encourage open, honest communication. Assisted patient in identifying risk factors which would indicate the need for higher level of care including thoughts to harm self or others and/or self-harming behavior and encouraged patient to contact this office, call 911, or present to the nearest emergency room should any of these events occur. Discussed crisis intervention services and means to access. Patient adamantly and convincingly denies current suicidal or homicidal ideation or perceptual disturbance.    Assessment:     Patient appears to maintain relative stability as compared to their baseline.  However, patient persistently struggles with symptoms which continue to cause impairment in important areas of functioning.  As a result, they can be reasonably expected to continue to benefit from treatment and would likely be at increased risk for decompensation otherwise.      PHQ-Score Total:  PHQ-9 Total Score:       EDITH-7 Score Total:         Mental Status Exam:   Hygiene:   good  Cooperation:  Cooperative  Eye Contact:  Good  Psychomotor Behavior:  Appropriate  Affect:  Full range  Mood: depressed and anxious  Speech:   Normal  Thought Process:  Goal directed  Thought Content:  Normal  Suicidal:  None  Homicidal: None  Hallucinations:  None  Delusion: None  Memory:  Intact  Orientation:  Grossly Intact  Reliability:  good  Insight:  Good  Judgement:  Good  Impulse Control:  Good  Physical/Medical Issues:  No        Functional Status: Mild impairment     Progress toward goal: Not at goal    Prognosis: Good with continued therapeutic intervention        Plan:    Patient will continue in individual outpatient therapy with focus on improved functioning and coping skills, maintaining stability, and avoiding decompensation and the need for higher level of care.    Patient will adhere to medication regimen as prescribed and report any side effects. Patient will contact this office, call 911 or present to the nearest emergency room should suicidal or homicidal ideations occur. Provide Cognitive Behavioral Therapy and Solution Focused Therapy to improve functioning, maintain stability, and avoid decompensation and the need for higher level of care.     Return in about 2 weeks (around 10/29/2024).      VISIT DIAGNOSIS:    Diagnosis Plan   1. Generalized anxiety disorder        2. Major depressive disorder, recurrent episode, moderate         08:01 EDT       This document has been electronically signed by EDWIN Hastings  October 16, 2024      Part of this note may be an electronic transcription/translation of spoken language to printed text using the Dragon Dictation System.

## 2024-10-24 DIAGNOSIS — R12 HEARTBURN: ICD-10-CM

## 2024-10-24 RX ORDER — PANTOPRAZOLE SODIUM 40 MG/1
40 TABLET, DELAYED RELEASE ORAL DAILY
Qty: 30 TABLET | Refills: 1 | Status: SHIPPED | OUTPATIENT
Start: 2024-10-24

## 2024-10-24 RX ORDER — TRIAMTERENE/HYDROCHLOROTHIAZID 37.5-25 MG
1 TABLET ORAL DAILY
Qty: 90 TABLET | Refills: 1 | Status: SHIPPED | OUTPATIENT
Start: 2024-10-24

## 2024-10-24 NOTE — TELEPHONE ENCOUNTER
Rx Refill Note  Requested Prescriptions     Pending Prescriptions Disp Refills    triamterene-hydrochlorothiazide (MAXZIDE-25) 37.5-25 MG per tablet        Last office visit with prescribing clinician: 3/29/2024   Last telemedicine visit with prescribing clinician: Visit date not found   Next office visit with prescribing clinician: 10/24/2024       Dawson Mansfield  10/24/24, 08:02 EDT

## 2024-10-26 DIAGNOSIS — F41.1 GENERALIZED ANXIETY DISORDER WITH PANIC ATTACKS: ICD-10-CM

## 2024-10-26 DIAGNOSIS — F41.0 GENERALIZED ANXIETY DISORDER WITH PANIC ATTACKS: ICD-10-CM

## 2024-10-26 DIAGNOSIS — F33.42 RECURRENT MAJOR DEPRESSIVE DISORDER, IN FULL REMISSION: ICD-10-CM

## 2024-10-28 RX ORDER — CITALOPRAM HYDROBROMIDE 20 MG/1
20 TABLET ORAL DAILY
Qty: 90 TABLET | Refills: 0 | Status: SHIPPED | OUTPATIENT
Start: 2024-10-28

## 2024-11-11 ENCOUNTER — TELEMEDICINE (OUTPATIENT)
Dept: PSYCHIATRY | Facility: CLINIC | Age: 54
End: 2024-11-11
Payer: COMMERCIAL

## 2024-11-11 DIAGNOSIS — F33.1 MAJOR DEPRESSIVE DISORDER, RECURRENT EPISODE, MODERATE: ICD-10-CM

## 2024-11-11 DIAGNOSIS — F41.1 GENERALIZED ANXIETY DISORDER: Primary | ICD-10-CM

## 2024-11-11 PROCEDURE — 90837 PSYTX W PT 60 MINUTES: CPT | Performed by: COUNSELOR

## 2024-11-11 NOTE — PROGRESS NOTES
Date: November 11, 2024  Time In: 8:01  Time out: 8:57      This provider is located at the [facility]through [facility]), [address], [place] [address] using a secure [employer] Video Visit through [employer]. Patient is being seen remotely via telehealth at home address in [place] and stated they are in a secure environment for this session. The patient's condition being diagnosed/treated is appropriate for telemedicine. The provider identified herself as well as her credentials. The patient, and/or patients guardian, consent to be seen remotely, and when consent is given they understand that the consent allows for patient identifiable information to be sent to a third party as needed. They may refuse to be seen remotely at any time. The electronic data is encrypted and password protected, and the patient and/or guardian has been advised of the potential risks to privacy not withstanding such measures.     You have chosen to receive care through a telehealth visit.  Do you consent to use a video/audio connection for your medical care today? Yes    Subjective   Nicki Fowler is a 54 y.o. female who presents today fully oriented, appropriately dressed and groomed, and open to communication for follow up appointment.    Chief Complaint:   Chief Complaint   Patient presents with    Anxiety    Depression        History of Present Illness: Rapport was established through conversation and unconditional positive regard. Recent events were discussed and how these events impact Pt's emotional health.   Pt reports successful use of learned coping skills 6 out of 10 times since last report.  Pt reports continuation of symptoms and states the intensity and duration of symptoms has remained unchanged since last report. Pt rates anxiety at 1/10 and depression at 2/10.     Sleep: Pt reports sleep has improved since last report. Healthy sleep habits were discussed such as maintaining a  "schedule, routine, avoiding caffeine, and limiting screen time before bed.  \"I have actually been sleeping well.\"    Appetite:  Pt reports appetite has been fair since last report.  Discussed the importance of hydration and eating a healthy diet for overall and mental health.  \"I'm eating, but there's nothing I really want.\"   Pt states she ate well on vacation.     Medication compliance: Pt reports medications are being taken daily as prescribed. Discussed the importance of compliance with prescriber's directions and Pt was instructed to report questions/concerns, as well as missed doses or discontinuation of medication by Pt.     Safety Plan in Place: No Pt denies SI/HI/SIB recent or current    Daily Functioning:  Since last report, Pt states symptoms are causing Moderate difficulty in daily functioning.      Content Discussion:   Pt reports life updates since previous session. Pt identified current stressors. Pt acknowledged stressors within and outside of one's control. Pt identified successes and issues with utilizing coping mechanisms.  Pt's cat began having seizures a couple of weeks ago and that was stressful, as they were going on vacation the following week.  Pt states she put cameras in their house while they were on vacation to monitor the cat's seizure activity.  Pt states her son is doing well, and Pt continues to help support him and this is a good arrangement that gives her peace of mind.  Pt and  went on vacation to [place], and \"that was fun.\"   Pt states she is \"emotionally\" tired.   Pt will \"Let go of the responsibility\" of doing things and take them off her plate and will delegate responsibilities.  Discussed ways pt can improve communication with .  Pt will do things like sit outside at night by a fire.      Counselor supported Pt in continued exploration of underlying belief systems which contribute to difficulty in connecting/vulnerability.  Through discussion, Pt identifies " themes of preservation and overt emotional and physical reactivity when physical and/or emotional statis is in question, even in low or perceived threatening situations. Counselor supported Pt in identifying past experiences and underlying beliefs which contribute to these feelings and reactions.  Pt was supported and encouraged in processing emotions related to frustrations with the expectations of self and others.  Pt was encouraged to identify cultural and nuclear familial contexts which contribute to these concerns.  Pt was receptive and engaged in conversation, and Pt reports this was beneficial and applicable to their situation.      Reviewed coping skills and encouraged Pt to continue to practicing coping skills daily when not under distress. Pt was praised for their attempts to decrease symptoms and mitigate activating events.    Clinical Maneuvering/Intervention:  CBT was utilized to encourage Pt to identify maladaptive thoughts and behaviors and replace with more affirming and positive.Pt encouraged to set and maintain appropriate and healthy boundaries with others. Pt was instructed to practice daily using appropriate and specific words to communicate feelings to others.  Motivational interviewing used to encourage Pt to identify strengths which can be utilized in working toward treatment goals. Pt encouraged to practice daily learned skills such as controlled breathing, grounding, and mindfulness. Pt was encouraged to ask for help from support persons to assist them in maintaining stability and alleviate symptoms. Discussed the importance of being one's own mental health advocate and to refrain from seeing the need to ask for help as a weakness. Pt was encouraged to formulate a plan of action to be more proactive in managing stressors and refrain from using reactive and automatic heightened emotional responses.     Solution-focused therapy employed to identify how Pt would like their life to be if they  were to make positive changes. Pt encouraged to identify effective coping skills and strengths they can use to continue utilizing those skills. Pt encouraged to discontinue utilizing non-effective coping mechanisms. Pt provided with feedback to highlight achievements and personal and other resources. Encouraged use of SMART goals    Assisted patient in processing above session content; acknowledged and normalized patient’s thoughts, feelings, and concerns.  Rationalized patient thought process regarding concerns presented at session.  Discussed triggers associated with patient's  anxiety  and depression  Also discussed coping skills for patient to implement such as controlled breathing , increasing activity , self care , and positive self talk     Allowed patient to freely discuss issues without interruption or judgment. Provided safe, confidential environment to facilitate the development of positive therapeutic relationship and encourage open, honest communication. Assisted patient in identifying risk factors which would indicate the need for higher level of care including thoughts to harm self or others and/or self-harming behavior and encouraged patient to contact this office, call 911, or present to the nearest emergency room should any of these events occur. Discussed crisis intervention services and means to access. Patient adamantly and convincingly denies current suicidal or homicidal ideation or perceptual disturbance.    Assessment:     Patient appears to maintain relative stability as compared to their baseline.  However, patient persistently struggles with symptoms which continue to cause impairment in important areas of functioning.  As a result, they can be reasonably expected to continue to benefit from treatment and would likely be at increased risk for decompensation otherwise.      PHQ-Score Total:  PHQ-9 Total Score: PHQ-9 Depression Screening  Little interest or pleasure in doing things?  1    Feeling down, depressed, or hopeless?  2   PHQ-2 Total Score     Trouble falling or staying asleep, or sleeping too much?  1   Feeling tired or having little energy?  3   Poor appetite or overeating?  2   Feeling bad about yourself - or that you are a failure or have let yourself or your family down?  0   Trouble concentrating on things, such as reading the newspaper or watching television?  2   Moving or speaking so slowly that other people could have noticed? Or the opposite - being so fidgety or restless that you have been moving around a lot more than usual?  0   Thoughts that you would be better off dead, or of hurting yourself in some way?  0   PHQ-9 Total Score  11   If you checked off any problems, how difficult have these problems made it for you to do your work, take care of things at home, or get along with other people? Very       Over the last two weeks, how often have you been bothered by the following problems?  Feeling nervous, anxious or on edge: Several days  Not being able to stop or control worrying: Not at all  Worrying too much about different things: Not at all  Trouble Relaxing: Several days  Being so restless that it is hard to sit still: Not at all  Becoming easily annoyed or irritable: Several days  Feeling afraid as if something awful might happen: Not at all  EDITH 7 Total Score: 3  If you checked any problems, how difficult have these problems made it for you to do your work, take care of things at home, or get along with other people: Not difficult at all      Mental Status Exam:   Hygiene:   good  Cooperation:  Cooperative  Eye Contact:  Good  Psychomotor Behavior:  Appropriate  Affect:  Full range  Mood: normal  Speech:  Normal  Thought Process:  Goal directed  Thought Content:  Normal  Suicidal:  None  Homicidal: None  Hallucinations:  None  Delusion: None  Memory:  Intact  Orientation:  Grossly Intact  Reliability:  good  Insight:  Good  Judgement:  Good  Impulse Control:   Good  Physical/Medical Issues:  No        Functional Status: Mild impairment     Progress toward goal: Not at goal    Prognosis: Good with continued therapeutic intervention        Plan:    Patient will continue in individual outpatient therapy with focus on improved functioning and coping skills, maintaining stability, and avoiding decompensation and the need for higher level of care.    Patient will adhere to medication regimen as prescribed and report any side effects. Patient will contact this office, call 911 or present to the nearest emergency room should suicidal or homicidal ideations occur. Provide Cognitive Behavioral Therapy and Solution Focused Therapy to improve functioning, maintain stability, and avoid decompensation and the need for higher level of care.     Return in about 2 weeks (around 11/25/2024).      VISIT DIAGNOSIS:    Diagnosis Plan   1. Generalized anxiety disorder  Ambulatory Referral to Psychiatry      2. Major depressive disorder, recurrent episode, moderate  Ambulatory Referral to Psychiatry       08:01 EST       This document has been electronically signed by EDWIN Hastings  November 11, 2024      Part of this note may be an electronic transcription/translation of spoken language to printed text using the Dragon Dictation System.

## 2024-11-26 ENCOUNTER — TELEMEDICINE (OUTPATIENT)
Dept: PSYCHIATRY | Facility: CLINIC | Age: 54
End: 2024-11-26
Payer: COMMERCIAL

## 2024-11-26 DIAGNOSIS — F41.1 GENERALIZED ANXIETY DISORDER: Primary | ICD-10-CM

## 2024-11-26 DIAGNOSIS — F33.1 MAJOR DEPRESSIVE DISORDER, RECURRENT EPISODE, MODERATE: ICD-10-CM

## 2024-11-26 NOTE — PROGRESS NOTES
Date: November 26, 2024  Time In:  10:58  Time out: 11:50      This provider is located at the [facility] ([facility]), [address], [place] [address] using a secure [employer] Video Visit through [employer]. Patient is being seen remotely via telehealth at home address in [place] and stated they are in a secure environment for this session. The patient's condition being diagnosed/treated is appropriate for telemedicine. The provider identified herself as well as her credentials. The patient, and/or patients guardian, consent to be seen remotely, and when consent is given they understand that the consent allows for patient identifiable information to be sent to a third party as needed. They may refuse to be seen remotely at any time. The electronic data is encrypted and password protected, and the patient and/or guardian has been advised of the potential risks to privacy not withstanding such measures.     You have chosen to receive care through a telehealth visit.  Do you consent to use a video/audio connection for your medical care today? Yes      Pt is located at home    Subjective   Nikci Fowler is a 54 y.o. female who presents today fully oriented, appropriately dressed and groomed, and open to communication for follow up appointment.    Chief Complaint:   Chief Complaint   Patient presents with    Anxiety    Depression    Pain        History of Present Illness: Rapport was established through conversation and unconditional positive regard. Recent events were discussed and how these events impact Pt's emotional health.   Pt reports successful use of learned coping skills since last report.  Pt reports continuation of symptoms and states the intensity and duration of symptoms has worsened since last report. Pt rates anxiety at 2/10 and depression at 5/10.     Sleep: Pt reports sleep has remained unchanged since last report. Healthy sleep habits were discussed  "such as maintaining a schedule, routine, avoiding caffeine, and limiting screen time before bed.    Appetite:  Pt reports appetite has been good since last report.  Discussed the importance of hydration and eating a healthy diet for overall and mental health.    Medication compliance: Pt reports medications are being taken daily as prescribed. Discussed the importance of compliance with prescriber's directions and Pt was instructed to report questions/concerns, as well as missed doses or discontinuation of medication by Pt.     Safety Plan in Place: No Pt denies SI/HI/SIB recent or current    Daily Functioning:  Since last report, Pt states symptoms are causing Moderate difficulty in daily functioning.      Content Discussion:   Pt reports life updates since previous session. Pt identified current stressors. Pt acknowledged stressors within and outside of one's control. Pt identified successes and issues with utilizing coping mechanisms.  Pt states she has been \"blah\" and slept last weekend. States she has been feeling overwhelmed and down the past week.  Pt report nothing has happened to contribute to her depression.   Reports malaise, fatigue, and pain from disc degeneration in her spine.  Pt states nothing can be done medically.   Discussed the need for restorative sleep to reset each day.  Pt and Counselor role played ways she can communicate with s/o to help take things off her responsibility plate and minimize anxiety. Discussed doing pleasure activities for fun and to refrain from identifying them as a chore.      Counselor supported Pt in continued exploration of underlying belief systems which contribute to difficulty in connecting/vulnerability.  Through discussion, Pt identifies themes of preservation and overt emotional and physical reactivity when physical and/or emotional statis is in question, even in low or perceived threatening situations. Counselor supported Pt in identifying past experiences and " underlying beliefs which contribute to these feelings and reactions.  Pt was supported and encouraged in processing emotions related to frustrations with the expectations of self and others.  Pt was encouraged to identify cultural and nuclear familial contexts which contribute to these concerns.  Pt was receptive and engaged in conversation, and Pt reports this was beneficial and applicable to their situation.      Reviewed coping skills and encouraged Pt to continue to practicing coping skills daily when not under distress. Pt was praised for their attempts to decrease symptoms and mitigate activating events.    Clinical Maneuvering/Intervention:  CBT was utilized to encourage Pt to identify maladaptive thoughts and behaviors and replace with more affirming and positive.Pt encouraged to set and maintain appropriate and healthy boundaries with others. Pt was instructed to practice daily using appropriate and specific words to communicate feelings to others.  Motivational interviewing used to encourage Pt to identify strengths which can be utilized in working toward treatment goals. Pt encouraged to practice daily learned skills such as controlled breathing, grounding, and mindfulness. Pt was encouraged to ask for help from support persons to assist them in maintaining stability and alleviate symptoms. Discussed the importance of being one's own mental health advocate and to refrain from seeing the need to ask for help as a weakness. Pt was encouraged to formulate a plan of action to be more proactive in managing stressors and refrain from using reactive and automatic heightened emotional responses.     Solution-focused therapy employed to identify how Pt would like their life to be if they were to make positive changes. Pt encouraged to identify effective coping skills and strengths they can use to continue utilizing those skills. Pt encouraged to discontinue utilizing non-effective coping mechanisms. Pt provided  with feedback to highlight achievements and personal and other resources. Encouraged use of SMART goals    Assisted patient in processing above session content; acknowledged and normalized patient’s thoughts, feelings, and concerns.  Rationalized patient thought process regarding concerns presented at session.  Discussed triggers associated with patient's  depression  Also discussed coping skills for patient to implement such as controlled breathing , increasing activity , self care , and positive self talk     Allowed patient to freely discuss issues without interruption or judgment. Provided safe, confidential environment to facilitate the development of positive therapeutic relationship and encourage open, honest communication. Assisted patient in identifying risk factors which would indicate the need for higher level of care including thoughts to harm self or others and/or self-harming behavior and encouraged patient to contact this office, call 911, or present to the nearest emergency room should any of these events occur. Discussed crisis intervention services and means to access. Patient adamantly and convincingly denies current suicidal or homicidal ideation or perceptual disturbance.    Assessment:     Patient appears to maintain relative stability as compared to their baseline.  However, patient persistently struggles with symptoms which continue to cause impairment in important areas of functioning.  As a result, they can be reasonably expected to continue to benefit from treatment and would likely be at increased risk for decompensation otherwise.       Mental Status Exam:   Hygiene:   good  Cooperation:  Cooperative  Eye Contact:  Good  Psychomotor Behavior:  Appropriate  Affect:  Restricted  Mood: anxious  Speech:  Normal  Thought Process:  Goal directed  Thought Content:  Normal  Suicidal:  None  Homicidal: None  Hallucinations:  None  Delusion: None  Memory:  Intact  Orientation:  Grossly  Intact  Reliability:  good  Insight:  Good  Judgement:  Good  Impulse Control:  Good  Physical/Medical Issues:   Pain        Functional Status: Moderate impairment     Progress toward goal: Not at goal    Prognosis: Good with continued therapeutic intervention        Plan:    Patient will continue in individual outpatient therapy with focus on improved functioning and coping skills, maintaining stability, and avoiding decompensation and the need for higher level of care.    Patient will adhere to medication regimen as prescribed and report any side effects. Patient will contact this office, call 911 or present to the nearest emergency room should suicidal or homicidal ideations occur. Provide Cognitive Behavioral Therapy and Solution Focused Therapy to improve functioning, maintain stability, and avoid decompensation and the need for higher level of care.     Return in about 2 weeks (around 12/10/2024).      VISIT DIAGNOSIS:    Diagnosis Plan   1. Generalized anxiety disorder        2. Major depressive disorder, recurrent episode, moderate         10:55 EST       This document has been electronically signed by EDWIN Hastings  November 26, 2024      Part of this note may be an electronic transcription/translation of spoken language to printed text using the Dragon Dictation System.

## 2024-12-11 ENCOUNTER — TELEMEDICINE (OUTPATIENT)
Dept: PSYCHIATRY | Facility: CLINIC | Age: 54
End: 2024-12-11
Payer: COMMERCIAL

## 2024-12-11 DIAGNOSIS — F41.1 GENERALIZED ANXIETY DISORDER: ICD-10-CM

## 2024-12-11 DIAGNOSIS — F33.1 MAJOR DEPRESSIVE DISORDER, RECURRENT EPISODE, MODERATE: Primary | ICD-10-CM

## 2024-12-11 RX ORDER — BUPROPION HYDROCHLORIDE 200 MG/1
200 TABLET, EXTENDED RELEASE ORAL EVERY MORNING
Qty: 90 TABLET | Refills: 0 | Status: SHIPPED | OUTPATIENT
Start: 2024-12-11

## 2024-12-11 NOTE — PROGRESS NOTES
"Mode of Visit: Video  Location of patient: -HOME-  Location of provider: +HOME+  You have chosen to receive care through a telehealth visit.  The patient has signed the video visit consent form.  The visit included audio and video interaction. No technical issues occurred during this visit.        Subjective   Nicki Fowler is a 54 y.o. female who presents today for initial evaluation     Chief Complaint:    \" Depression and anxiety\"    History of Present Illness:     History of Present Illness  Patient referred from her therapist FELIPE Lunsford.  Patient recently lost her medication provider and needing new provider for medication management.  Patient reports she is currently doing fairly well on the Wellbutrin and the Celexa.  However she does feel flat sometimes and tired a lot.  Nothing really gets her overly excited.  It is not that she is numb in her emotions is just that she is just feeling a little flat.  She has not had labs drawn since March, I did discuss with her making sure she had enough protein intake and that in the future she may want to discuss with primary care getting some iron studies and some more thorough thyroid studies.  Patient does reports she exercises and she runs.  Patient reported a PHQ-9 score today of 9.  See patient endorsed symptomology below.  Patient reported a EDITH-7 score today of 5.  See patient endorsed symptomology below.  Patient denied any history of a head injury or seizure.  She denied any history of psychosis of hallucinations.  Patient reported she would be willing to increase the Wellbutrin a little bit so we will increase to 200 mg sustained release.  She does endorse that the Celexa has done a really good job for her anxiety.  She previously was using a little bit of alprazolam as needed but reports she has not had to use that in a long time.         Patient Health Questionnaire-9 (PHQ-9) (Depression Screening Tool)  Little interest or pleasure in doing things? " (Patient-Rptd) Several days   Feeling down, depressed, or hopeless? (Patient-Rptd) Several days   PHQ-2 Total Score (Patient-Rptd) 2   Trouble falling or staying asleep, or sleeping too much? (Patient-Rptd) Over half   Feeling tired or having little energy? (Patient-Rptd) Over half   Poor appetite or overeating? (Patient-Rptd) Several days   Feeling bad about yourself - or that you are a failure or have let yourself or your family down? (Patient-Rptd) Not at all   Trouble concentrating on things, such as reading the newspaper or watching television? (Patient-Rptd) Over half   Moving or speaking so slowly that other people could have noticed? Or the opposite - being so fidgety or restless that you have been moving around a lot more than usual? (Patient-Rptd) Not at all   Thoughts that you would be better off dead, or of hurting yourself in some way? (Patient-Rptd) Not at all   PHQ-9 Total Score (Patient-Rptd) 9   If you checked off any problems, how difficult have these problems made it for you to do your work, take care of things at home, or get along with other people? (Patient-Rptd) Somewhat difficult         PHQ-9 Total Score: (Patient-Rptd) 9       Generalized Anxiety Disorder 7-Item (EDITH-7) Screening Tool  Feeling nervous, anxious or on edge: (Patient-Rptd) Not at all  Not being able to stop or control worrying: (Patient-Rptd) More than half the days  Worrying too much about different things: (Patient-Rptd) Not at all  Trouble Relaxing: (Patient-Rptd) More than half the days  Being so restless that it is hard to sit still: (Patient-Rptd) Not at all  Feeling afraid as if something awful might happen: (Patient-Rptd) Not at all  Becoming easily annoyed or irritable: (Patient-Rptd) Several days  EDITH 7 Total Score: (Patient-Rptd) 5  If you checked any problems, how difficult have these problems made it for you to do your work, take care of things at home, or get along with other people: (Patient-Rptd) Not difficult  at all         The following portions of the patient's history were reviewed and updated as appropriate: allergies, current medications, past family history, past medical history, past social history, past surgical history and problem list.    Past Psychiatric History:  Began Treatment: Around age 20  Diagnoses:Depression and Anxiety  Psychiatrist: Most recent medication provider was Jayjay Burgess with [facility]  Therapist: Currently in regular psychotherapy with FELIPE Lunsford since 2024.  Patient was engaged in regular psychotherapy prior to that, but that therapist resigned.  Admission History: Patient reported 1 admission to our [facility] when she was 20 years old for suicidal ideation.  She reports she received therapy and medication services and did outpatient for a while at our [facility].  Medication Trials: Pamelor when she was admitted to our [facility].  Most recently has been on Wellbutrin and Celexa and reports this combination has been effective.  She also was recently prescribed Pamelor by her gastroenterologist.  Self Harm: Denies  Suicide Attempts:Denies   Psychosis, Anxiety, Depression:  Patient did endorse postpartum depression with all 3 pregnancies, she reports she feels like it was worst after the third child birth.  She did not receive any treatment.    Past Medical History:  Past Medical History:   Diagnosis Date    Allergic     seasonal, foods, shellfish    Anxiety and depression     Arthritis     Cervical disc disorder     Chronic pain disorder     Degenerative disc disease    Closed fracture of coccyx 2013    Colon polyp 2018    Dr. Mares removed during colonoscopy    Dizziness     constant x 3 weeks    Headache     History of seasonal allergies     Hyperlipidemia     IBS (irritable bowel syndrome)     Lactose intolerance early     dairy causes bloating and gas    Low back pain     Lumbosacral disc disease     Meniere's disease     Panic  disorder     Peripheral neuropathy     Psychiatric illness        Substance Abuse History:   Types:Denies all, including illicit       Social History:  Social History     Socioeconomic History    Marital status:      Spouse name: Bob    Number of children: 3    Years of education: BA    Highest education level: Bachelor's degree (e.g., BA, AB, BS)   Tobacco Use    Smoking status: Former     Current packs/day: 0.00     Average packs/day: 2.2 packs/day for 33.3 years (74.9 ttl pk-yrs)     Types: Cigarettes     Start date: 1983     Quit date: 2008     Years since quittin.9    Smokeless tobacco: Never    Tobacco comments:     stopped 5 years for pregnancies   Vaping Use    Vaping status: Never Used   Substance and Sexual Activity    Alcohol use: Not Currently     Alcohol/week: 2.0 standard drinks of alcohol     Comment: Social    Drug use: No    Sexual activity: Yes     Partners: Male     Birth control/protection: Post-menopausal, Vasectomy   Patient reports support network consisting of her .  Patient is gainfully employed, has a bachelor's degree, no history of  service, no history of legal issues.    Family History:  Family History   Problem Relation Age of Onset    Cancer Mother         Non-Hodgkins Lymphoma    Hypertension Mother     Atrial fibrillation Mother     Arthritis Mother         DDD    Heart disease Mother         A-fib    Hyperlipidemia Mother     Thyroid disease Mother         Hypothyroid    Cancer Father         Hodgkins Lymphoma and Non-Hodgkins Lymphoma at two separate times of his life    Heart attack Father     Stroke Father     Hypertension Brother     Arthritis Brother         DDD    Hyperlipidemia Brother     Asthma Daughter     Arthritis Brother         DDD    Heart disease Maternal Aunt         Congestive Heart Failure    Dementia Paternal Uncle     Malig Hyperthermia Neg Hx        Past Surgical History:  Past Surgical History:   Procedure Laterality  Date    ABDOMINAL SURGERY  10/10/1996        ANTERIOR CERVICAL DISCECTOMY W/ FUSION N/A 2018    Procedure: C5-6 ANTERIOR CERVICAL DISCECTOMY FUSION WITH INSTRUMENTATION;  Surgeon: Porter Kendrick MD;  Location: [facility] MAIN OR;  Service: Neurosurgery    BACK SURGERY  2018    Cervical discectomy & fusion     SECTION      COLONOSCOPY N/A 2018    One 6 mm polyp in the ascending colon, NBIH.  PATH: Hyperplastic polyp     COLONOSCOPY  2018    COLONOSCOPY N/A 2023    Procedure: COLONOSCOPY to cecum ti with cold snare polypectomy;  Surgeon: Cristian Mares MD;  Location: [facility] ENDOSCOPY;  Service: Gastroenterology;  Laterality: N/A;  pre hx colon polyps  post polyp hemorrhoids    EPIDURAL BLOCK      Series of 4 block for cervical ddd    EYE SURGERY      Lasik    LASIK      NECK SURGERY      WISDOM TOOTH EXTRACTION         Problem List:  Patient Active Problem List   Diagnosis    Chronic diarrhea    Irritable bowel syndrome with diarrhea    Cervical radiculopathy    Lumbar radiculopathy    High risk medication use    History of adenomatous polyp of colon    Moderate episode of recurrent major depressive disorder    Generalized anxiety disorder with panic attacks       Allergy:   Allergies   Allergen Reactions    Cinnamon Other (See Comments) and Shortness Of Breath    Shellfish-Derived Products Hives        Current Medications:   Current Outpatient Medications   Medication Sig Dispense Refill    cetirizine (zyrTEC) 10 MG tablet Take 1 tablet by mouth Daily.      citalopram (CeleXA) 20 MG tablet TAKE 1 TABLET BY MOUTH DAILY 90 tablet 0    FIBER PO Take  by mouth Every Morning.      nortriptyline (PAMELOR) 10 MG capsule TAKE TWO CAPSULES BY MOUTH ONCE NIGHTLY 180 capsule 3    Omega-3 Fatty Acids (fish oil) 1000 MG capsule capsule Take  by mouth Daily With Breakfast.      pantoprazole (PROTONIX) 40 MG EC tablet TAKE 1 TABLET BY MOUTH DAILY 30 tablet 1     triamterene-hydrochlorothiazide (MAXZIDE-25) 37.5-25 MG per tablet Take 1 tablet by mouth Daily. 90 tablet 1    ALPRAZolam (Xanax) 0.25 MG tablet Take 1 tablet by mouth 2 (Two) Times a Day As Needed for Anxiety (panic). (Patient not taking: Reported on 12/11/2024) 60 tablet 0    buPROPion SR (WELLBUTRIN SR) 200 MG 12 hr tablet Take 1 tablet by mouth Every Morning. 90 tablet 0     No current facility-administered medications for this visit.       Review of Systems:    Review of Systems   Constitutional:  Positive for fatigue.   Psychiatric/Behavioral:  Positive for decreased concentration and depressed mood.    All other systems reviewed and are negative.        Physical Exam:   Physical Exam  Vitals reviewed.   Constitutional:       Appearance: Normal appearance. She is well-developed and well-groomed.   HENT:      Head: Normocephalic.   Neurological:      Mental Status: She is alert.   Psychiatric:         Attention and Perception: Attention and perception normal.         Mood and Affect: Affect normal. Mood is depressed.         Speech: Speech normal.         Behavior: Behavior normal. Behavior is cooperative.         Thought Content: Thought content normal.         Cognition and Memory: Cognition and memory normal.         Vitals:  not currently breastfeeding. There is no height or weight on file to calculate BMI.  Due to extenuating circumstances and possible current health risks associated with the patient being present in a clinical setting (with current health restrictions in place in regards to possible COVID 19 transmission/exposure), the patient was seen remotely today via a MyChart Video Visit through [facility] and telephone encounter.  Unable to obtain vital signs due to nature of remote visit.  Height stated at 62 inches.  Weight stated at 133 pounds.    Last 3 Blood Pressure Readings:  BP Readings from Last 3 Encounters:   03/29/24 130/78   07/07/23 119/88   04/20/23 98/68          Mental Status Exam:    Hygiene:   good  Cooperation:  Cooperative  Eye Contact:  Good  Psychomotor Behavior:  Appropriate  Affect:  Full range  Mood: depressed  Hopelessness: Denies  Speech:  Normal  Thought Process:  Goal directed and Linear  Thought Content:  Normal  Suicidal:  None  Homicidal:  None  Hallucinations:  None  Delusion:  None  Memory:  Intact  Orientation:  Person, Place, Time, and Situation  Reliability:  good  Insight:  Good  Judgement:  Good  Impulse Control:  Good  Physical/Medical Issues:  Yes see medical history        Lab Results:   No visits with results within 6 Month(s) from this visit.   Latest known visit with results is:   Office Visit on 03/29/2024   Component Date Value Ref Range Status    WBC 03/29/2024 3.85  3.40 - 10.80 10*3/mm3 Final    RBC 03/29/2024 4.30  3.77 - 5.28 10*6/mm3 Final    Hemoglobin 03/29/2024 12.9  12.0 - 15.9 g/dL Final    Hematocrit 03/29/2024 40.2  34.0 - 46.6 % Final    MCV 03/29/2024 93.5  79.0 - 97.0 fL Final    MCH 03/29/2024 30.0  26.6 - 33.0 pg Final    MCHC 03/29/2024 32.1  31.5 - 35.7 g/dL Final    RDW 03/29/2024 12.5  12.3 - 15.4 % Final    Platelets 03/29/2024 255  140 - 450 10*3/mm3 Final    Neutrophil Rel % 03/29/2024 62.0  42.7 - 76.0 % Final    Lymphocyte Rel % 03/29/2024 26.0  19.6 - 45.3 % Final    Monocyte Rel % 03/29/2024 9.1  5.0 - 12.0 % Final    Eosinophil Rel % 03/29/2024 1.8  0.3 - 6.2 % Final    Basophil Rel % 03/29/2024 0.8  0.0 - 1.5 % Final    Neutrophils Absolute 03/29/2024 2.39  1.70 - 7.00 10*3/mm3 Final    Lymphocytes Absolute 03/29/2024 1.00  0.70 - 3.10 10*3/mm3 Final    Monocytes Absolute 03/29/2024 0.35  0.10 - 0.90 10*3/mm3 Final    Eosinophils Absolute 03/29/2024 0.07  0.00 - 0.40 10*3/mm3 Final    Basophils Absolute 03/29/2024 0.03  0.00 - 0.20 10*3/mm3 Final    Immature Granulocyte Rel % 03/29/2024 0.3  0.0 - 0.5 % Final    Immature Grans Absolute 03/29/2024 0.01  0.00 - 0.05 10*3/mm3 Final    nRBC 03/29/2024 0.0  0.0 - 0.2 /100 WBC Final     Glucose 03/29/2024 80  65 - 99 mg/dL Final    BUN 03/29/2024 11  6 - 20 mg/dL Final    Creatinine 03/29/2024 0.80  0.57 - 1.00 mg/dL Final    EGFR Result 03/29/2024 88.8  >60.0 mL/min/1.73 Final    Comment: GFR Normal >60  Chronic Kidney Disease <60  Kidney Failure <15      BUN/Creatinine Ratio 03/29/2024 13.8  7.0 - 25.0 Final    Sodium 03/29/2024 139  136 - 145 mmol/L Final    Potassium 03/29/2024 3.8  3.5 - 5.2 mmol/L Final    Chloride 03/29/2024 100  98 - 107 mmol/L Final    Total CO2 03/29/2024 31.2 (H)  22.0 - 29.0 mmol/L Final    Calcium 03/29/2024 9.4  8.6 - 10.5 mg/dL Final    Total Protein 03/29/2024 6.8  6.0 - 8.5 g/dL Final    Albumin 03/29/2024 4.4  3.5 - 5.2 g/dL Final    Globulin 03/29/2024 2.4  gm/dL Final    A/G Ratio 03/29/2024 1.8  g/dL Final    Total Bilirubin 03/29/2024 0.4  0.0 - 1.2 mg/dL Final    Alkaline Phosphatase 03/29/2024 96  39 - 117 U/L Final    AST (SGOT) 03/29/2024 28  1 - 32 U/L Final    ALT (SGPT) 03/29/2024 16  1 - 33 U/L Final    Total Cholesterol 03/29/2024 230 (H)  0 - 200 mg/dL Final    Comment: Cholesterol Reference Ranges  (U.S. Department of Health and Human Services ATP III  Classifications)  Desirable          <200 mg/dL  Borderline High    200-239 mg/dL  High Risk          >240 mg/dL  Triglyceride Reference Ranges  (U.S. Department of Health and Human Services ATP III  Classifications)  Normal           <150 mg/dL  Borderline High  150-199 mg/dL  High             200-499 mg/dL  Very High        >500 mg/dL  HDL Reference Ranges  (U.S. Department of Health and Human Services ATP III  Classifications)  Low     <40 mg/dl (major risk factor for CHD)  High    >60 mg/dl ('negative' risk factor for CHD)  LDL Reference Ranges  (U.S. Department of Health and Human Services ATP III  Classifications)  Optimal          <100 mg/dL  Near Optimal     100-129 mg/dL  Borderline High  130-159 mg/dL  High             160-189 mg/dL  Very High        >189 mg/dL      Triglycerides 03/29/2024  87  0 - 150 mg/dL Final    HDL Cholesterol 03/29/2024 77 (H)  40 - 60 mg/dL Final    VLDL Cholesterol Rob 03/29/2024 15  5 - 40 mg/dL Final    LDL Chol Calc (NIH) 03/29/2024 138 (H)  0 - 100 mg/dL Final    TSH 03/29/2024 0.919  0.270 - 4.200 uIU/mL Final    Vitamin B-12 03/29/2024 584  211 - 946 pg/mL Final    Results may be falsely increased if patient taking Biotin.            Assessment & Plan   Diagnoses and all orders for this visit:    1. Major depressive disorder, recurrent episode, moderate (Primary)  -     buPROPion SR (WELLBUTRIN SR) 200 MG 12 hr tablet; Take 1 tablet by mouth Every Morning.  Dispense: 90 tablet; Refill: 0    2. Generalized anxiety disorder        Visit Diagnoses:    ICD-10-CM ICD-9-CM   1. Major depressive disorder, recurrent episode, moderate  F33.1 296.32   2. Generalized anxiety disorder  F41.1 300.02         GOALS:  Short Term Goals: Patient will be compliant with medication, and patient will have no significant medication related side effects.  Patient will be engaged in psychotherapy as indicated.  Patient will report subjective improvement of symptoms.  Long term goals: To stabilize mood and treat/improve subjective symptoms, the patient will stay out of the hospital, the patient will be at an optimal level of functioning, and the patient will take all medications as prescribed.  The patient/guardian verbalized understanding and agreement with goals that were mutually set.    SUICIDE RISK ASSESSMENT AND SAFETY PLAN: Unalterable demographics and a history of mental health intervention indicate this patient is in a high risk category compared to the general population. At present, the patient denies active SI/HI, intentions, or plans at this time and agrees to seek immediate care should such thoughts develop. The patient verbalizes understanding of how to access emergency care if needed and agrees to do so. Consideration of suicide risk and protective factors such as history, current  presentation, individual strengths and weaknesses, psychosocial and environmental stressors and variables, psychiatric illness and symptoms, medical conditions and pain, took place in this interview. Based on those considerations, the patient is determined: within individual baseline and presenting no imminent risk for suicide or homicide. Other recommendations: The patient does not meet the criteria for inpatient admission and is not a safety risk to self or others at today's visit. Inpatient treatment offers no significant advantages over outpatient treatment for this patient at today's visit.  The patient was given ample time for questions and fully participated during the encounter/in treatment planning.  The patient was encouraged to call the clinic with any questions or concerns.  The patient was informed of access to emergency care. If patient were to develop any significant symptomatology, suicidal ideation, homicidal ideation, any concerns, or feel unsafe at any time they are to call the clinic and if unable to get immediate assistance should immediately call 911 or go to the nearest emergency room.  Patient contracted verbally for the following: If you are experiencing an emotional crisis or have thoughts of harming yourself or others, please go to your nearest local emergency room or call 911. Will continue to re-assess medication response and side effects frequently to establish efficacy and ensure safety. Risks, any black box warnings, side effects, off label usage, and benefits of medication and treatment discussed with patient, along with potential adverse side effects of current and/or newly prescribed medication, alternative treatment options, and OTC medications.  Patient verbalized understanding of potential risks, any off label use of medication, any black box warnings, and any side effects in their own words. The patient verbalized understanding and agreed to comply with the safety plan  discussed in their own words.      TREATMENT PLAN: Continue supportive psychotherapy efforts and medications as indicated.   Pharmacological and Non-Pharmacological treatment options discussed during today's visit. Patient/Guardian acknowledged and verbally consented with current treatment plan and was educated on the importance of compliance with treatment and follow-up appointments.      MEDICATION ISSUES:  Discussed medication options and treatment plan of prescribed medication as well as the risks, benefits, any black box warnings, and side effects including potential falls, possible impaired driving, and metabolic adversities among others. Patient is agreeable to call the office with any worsening of symptoms or onset of side effects, or if any concerns or questions arise.  The contact information for the office is made available to the patient. Patient is agreeable to call 911 or go to the nearest ER should they begin having any SI/HI, or if any urgent concerns arise. No medication side effects or related complaints today.       Increase Wellbutrin to 200 mg SR tablet every morning  Continue with Celexa 20 mg daily    MEDS ORDERED DURING VISIT:  New Medications Ordered This Visit   Medications    buPROPion SR (WELLBUTRIN SR) 200 MG 12 hr tablet     Sig: Take 1 tablet by mouth Every Morning.     Dispense:  90 tablet     Refill:  0     Dosage change       Follow Up Appointment:   Return in about 6 weeks (around 1/20/2025) for Recheck, Video visit.               This document has been electronically signed by JAYDA Brown  December 11, 2024 13:56 EST    Dictated Utilizing Dragon Dictation: Part of this note may be an electronic transcription/translation of spoken language to printed text using the Dragon Dictation System.

## 2024-12-11 NOTE — PATIENT INSTRUCTIONS
For concerns or needing assistance call the [facility] at [phone]  Increase Wellbutrin to 200 mg SR tablet every morning  Continue with Celexa 20 mg daily

## 2024-12-16 RX ORDER — NORTRIPTYLINE HYDROCHLORIDE 10 MG/1
20 CAPSULE ORAL NIGHTLY
Qty: 180 CAPSULE | Refills: 3 | OUTPATIENT
Start: 2024-12-16

## 2024-12-17 NOTE — TELEPHONE ENCOUNTER
Caller: MalcolmNicki    Relationship: Self    Best call back number: [phone]    Requested Prescriptions:   Requested Prescriptions     Pending Prescriptions Disp Refills    nortriptyline (PAMELOR) 10 MG capsule 180 capsule 3     Sig: Take 2 capsules by mouth Every Night.        Pharmacy where request should be sent:      Last office visit with prescribing clinician: Visit date not found   Last telemedicine visit with prescribing clinician: Visit date not found   Next office visit with prescribing clinician: 2/19/2025     Additional details provided by patient: PT HAS AN APPT IN FEB, EARLIEST DATE HUB COULD FIND.  SHE WILL NEED  REFILLS TO GET HER BY UNTIL THE APPT.    Does the patient have less than a 3 day supply:  [x] Yes  [] No    Would you like a call back once the refill request has been completed: [] Yes [x] No    If the office needs to give you a call back, can they leave a voicemail: [x] Yes [] No

## 2024-12-18 RX ORDER — NORTRIPTYLINE HYDROCHLORIDE 10 MG/1
20 CAPSULE ORAL NIGHTLY
Qty: 180 CAPSULE | Refills: 3 | OUTPATIENT
Start: 2024-12-18

## 2024-12-21 DIAGNOSIS — R12 HEARTBURN: ICD-10-CM

## 2024-12-23 RX ORDER — PANTOPRAZOLE SODIUM 40 MG/1
40 TABLET, DELAYED RELEASE ORAL DAILY
Qty: 30 TABLET | Refills: 1 | Status: SHIPPED | OUTPATIENT
Start: 2024-12-23

## 2024-12-26 ENCOUNTER — TELEMEDICINE (OUTPATIENT)
Dept: PSYCHIATRY | Facility: CLINIC | Age: 54
End: 2024-12-26
Payer: COMMERCIAL

## 2024-12-26 DIAGNOSIS — F41.1 GENERALIZED ANXIETY DISORDER: ICD-10-CM

## 2024-12-26 DIAGNOSIS — F33.1 MAJOR DEPRESSIVE DISORDER, RECURRENT EPISODE, MODERATE: Primary | ICD-10-CM

## 2024-12-26 NOTE — PROGRESS NOTES
Date: December 26, 2024  Time In: 8:01  Time out: 8:56      This provider is located at [facility] ), [address], [place] [address] using a secure [employer] Video Visit through [employer]. Patient is being seen remotely via telehealth at home address in [place] and stated they are in a secure environment for this session. The patient's condition being diagnosed/treated is appropriate for telemedicine. The provider identified herself as well as her credentials. The patient, and/or patients guardian, consent to be seen remotely, and when consent is given they understand that the consent allows for patient identifiable information to be sent to a third party as needed. They may refuse to be seen remotely at any time. The electronic data is encrypted and password protected, and the patient and/or guardian has been advised of the potential risks to privacy not withstanding such measures.     You have chosen to receive care through a telehealth visit.  Do you consent to use a video/audio connection for your medical care today? Yes      Pt is located at home at address on file.    Tammy Fowler is a 54 y.o. female who presents today fully oriented, appropriately dressed and groomed, and open to communication for follow up appointment.    Chief Complaint:   Chief Complaint   Patient presents with    Anxiety        History of Present Illness: Rapport was established through conversation and unconditional positive regard. Recent events were discussed and how these events impact Pt's emotional health.   Pt reports successful use of learned coping skills since last report.  Pt reports continuation of symptoms and states the intensity and duration of symptoms has remained unchanged since last report. Pt rates anxiety at 0/10 and depression at 1/10.     Sleep: Pt reports sleep has remained unchanged since last report. Healthy sleep habits were  "discussed such as maintaining a schedule, routine, avoiding caffeine, and limiting screen time before bed.    Appetite:  Pt reports appetite has been good since last report.  Discussed the importance of hydration and eating a healthy diet for overall and mental health.    Medication compliance: Pt reports medications are being taken daily as prescribed. Discussed the importance of compliance with prescriber's directions and Pt was instructed to report questions/concerns, as well as missed doses or discontinuation of medication by Pt.  Due to no refills Pt is not taking the Nortriptyline for past two weeks.     Safety Plan in Place: No Pt denies SI/HI/SIB recent or current    Daily Functioning:  Since last report, Pt states symptoms are causing Moderate difficulty in daily functioning.      Content Discussion:   Pt reports life updates since previous session. Pt identified current stressors. Pt acknowledged stressors within and outside of one's control. Pt identified successes and issues with utilizing coping mechanisms.  Pt states they had a low key holiday.  Pt states she usually does not like holiday due to her having to do extra tasks such as shopping and meals and this is stressful.  Pt states the IBS symptoms are worsened due to not having her medication.  This interfered with planned holiday events such as going to an [employer].   Discussed Pt having some quiet time to run errands and do tasks uninterrupted while setting boundaries with others.  Pt will go into \"stealth mode\" and enjoy a beverage or go to a book store and take her time and enjoy the moment when out.  Pt states she will refrain from making all decisions in the household and place accountability with spouse and adult children for their own needs.   Pt states she will implement some small changes over next two weeks and report results at next session.     Counselor supported Pt in continued exploration of underlying belief systems which " contribute to difficulty in connecting/vulnerability.  Through discussion, Pt identifies themes of preservation and overt emotional and physical reactivity when physical and/or emotional statis is in question, even in low or perceived threatening situations. Counselor supported Pt in identifying past experiences and underlying beliefs which contribute to these feelings and reactions.  Pt was supported and encouraged in processing emotions related to frustrations with the expectations of self and others.  Pt was encouraged to identify cultural and nuclear familial contexts which contribute to these concerns.  Pt was receptive and engaged in conversation, and Pt reports this was beneficial and applicable to their situation.      Reviewed coping skills and encouraged Pt to continue to practicing coping skills daily when not under distress. Pt was praised for their attempts to decrease symptoms and mitigate activating events.    Clinical Maneuvering/Intervention:  CBT was utilized to encourage Pt to identify maladaptive thoughts and behaviors and replace with more affirming and positive.Pt encouraged to set and maintain appropriate and healthy boundaries with others. Pt was instructed to practice daily using appropriate and specific words to communicate feelings to others.  Motivational interviewing used to encourage Pt to identify strengths which can be utilized in working toward treatment goals. Pt encouraged to practice daily learned skills such as controlled breathing, grounding, and mindfulness. Pt was encouraged to ask for help from support persons to assist them in maintaining stability and alleviate symptoms. Discussed the importance of being one's own mental health advocate and to refrain from seeing the need to ask for help as a weakness. Pt was encouraged to formulate a plan of action to be more proactive in managing stressors and refrain from using reactive and automatic heightened emotional responses.      Solution-focused therapy employed to identify how Pt would like their life to be if they were to make positive changes. Pt encouraged to identify effective coping skills and strengths they can use to continue utilizing those skills. Pt encouraged to discontinue utilizing non-effective coping mechanisms. Pt provided with feedback to highlight achievements and personal and other resources. Encouraged use of SMART goals    Assisted patient in processing above session content; acknowledged and normalized patient’s thoughts, feelings, and concerns.  Rationalized patient thought process regarding concerns presented at session.  Discussed triggers associated with patient's  anxiety  and depression  Also discussed coping skills for patient to implement such as self care  and positive self talk     Allowed patient to freely discuss issues without interruption or judgment. Provided safe, confidential environment to facilitate the development of positive therapeutic relationship and encourage open, honest communication. Assisted patient in identifying risk factors which would indicate the need for higher level of care including thoughts to harm self or others and/or self-harming behavior and encouraged patient to contact this office, call 911, or present to the nearest emergency room should any of these events occur. Discussed crisis intervention services and means to access. Patient adamantly and convincingly denies current suicidal or homicidal ideation or perceptual disturbance.    Assessment:     Patient appears to maintain relative stability as compared to their baseline.  However, patient persistently struggles with symptoms which continue to cause impairment in important areas of functioning.  As a result, they can be reasonably expected to continue to benefit from treatment and would likely be at increased risk for decompensation otherwise.      PHQ-Score Total:  PHQ-9 Total Score: PHQ-9 Depression  Screening  Little interest or pleasure in doing things?  1   Feeling down, depressed, or hopeless?  1   PHQ-2 Total Score     Trouble falling or staying asleep, or sleeping too much?  2   Feeling tired or having little energy?  2   Poor appetite or overeating?  2   Feeling bad about yourself - or that you are a failure or have let yourself or your family down?  1   Trouble concentrating on things, such as reading the newspaper or watching television?  3   Moving or speaking so slowly that other people could have noticed? Or the opposite - being so fidgety or restless that you have been moving around a lot more than usual?  1   Thoughts that you would be better off dead, or of hurting yourself in some way?  0   PHQ-9 Total Score  13   If you checked off any problems, how difficult have these problems made it for you to do your work, take care of things at home, or get along with other people?  Somewhat Difficult               Mental Status Exam:   Hygiene:   good  Cooperation:  Cooperative  Eye Contact:  Good  Psychomotor Behavior:  Appropriate  Affect:  Full range  Mood: anxious  Speech:  Normal  Thought Process:  Goal directed  Thought Content:  Normal  Suicidal:  None  Homicidal: None  Hallucinations:  None  Delusion: None  Memory:  Intact  Orientation:  Grossly Intact  Reliability:  good  Insight:  Good  Judgement:  Good  Impulse Control:  Good  Physical/Medical Issues:  No        Functional Status: Moderate impairment     Progress toward goal: Not at goal    Prognosis: Good with continued therapeutic intervention        Plan:    Patient will continue in individual outpatient therapy with focus on improved functioning and coping skills, maintaining stability, and avoiding decompensation and the need for higher level of care.    Patient will adhere to medication regimen as prescribed and report any side effects. Patient will contact this office, call 911 or present to the nearest emergency room should suicidal or  homicidal ideations occur. Provide Cognitive Behavioral Therapy and Solution Focused Therapy to improve functioning, maintain stability, and avoid decompensation and the need for higher level of care.     Return in about 2 weeks (around 1/9/2025).      VISIT DIAGNOSIS:    Diagnosis Plan   1. Major depressive disorder, recurrent episode, moderate        2. Generalized anxiety disorder         07:54 EST       This document has been electronically signed by EDWIN Hastings  December 26, 2024      Part of this note may be an electronic transcription/translation of spoken language to printed text using the Dragon Dictation System.

## 2024-12-26 NOTE — TREATMENT PLAN
Pt is making some progress toward goals at this time.  Pt would benefit from continued bi-weekly individual counseling at this time.        Anxiety 0  Depression 1  PHQ-9:  13     I have discussed and reviewed this treatment plan with the patient.

## 2025-01-15 ENCOUNTER — TELEMEDICINE (OUTPATIENT)
Dept: PSYCHIATRY | Facility: CLINIC | Age: 55
End: 2025-01-15
Payer: COMMERCIAL

## 2025-01-15 DIAGNOSIS — F41.1 GENERALIZED ANXIETY DISORDER: ICD-10-CM

## 2025-01-15 DIAGNOSIS — F33.1 MAJOR DEPRESSIVE DISORDER, RECURRENT EPISODE, MODERATE: Primary | ICD-10-CM

## 2025-01-15 NOTE — PROGRESS NOTES
Date: January 15, 2025  Time In: 11:01  Time out: 11:50      This provider is located at the [facility] (through [facility]), [address], [place] [address] using a secure [employer] Video Visit through [employer]. Patient is being seen remotely via telehealth, and they are in a secure environment for this session. The patient's condition being diagnosed/treated is appropriate for telemedicine. The provider identified herself as well as her credentials. The patient, and/or patients guardian, consent to be seen remotely, and when consent is given they understand that the consent allows for patient identifiable information to be sent to a third party as needed. They may refuse to be seen remotely at any time. The electronic data is encrypted and password protected, and the patient and/or guardian has been advised of the potential risks to privacy not withstanding such measures.     Mode of Visit: Video  Location of patient: home  Location of provider: Provider home  You have chosen to receive care through a telehealth visit.  The patient has signed the video visit consent form.  The visit included audio and video interaction. No technical issues occurred during this visit    Subjective   Nicki Fowler is a 54 y.o. female who presents today fully oriented, appropriately dressed and groomed, and open to communication for follow up appointment.    Chief Complaint:   Chief Complaint   Patient presents with    Anxiety    Depression        History of Present Illness: Rapport was established through conversation and unconditional positive regard. Recent events were discussed and how these events impact Pt's emotional health.   Pt reports successful use of learned coping skills since last report.  Pt reports continuation of symptoms and states the intensity and duration of symptoms has improved since last report. Pt rates anxiety at 0/10 and depression at 0/10.     Sleep: Pt reports  "sleep has remained unchanged since last report. Healthy sleep habits were discussed such as maintaining a schedule, routine, avoiding caffeine, and limiting screen time before bed.    Appetite:  Pt reports appetite has been good since last report.  Discussed the importance of hydration and eating a healthy diet for overall and mental health.    Medication compliance: Pt reports medications are being taken daily as prescribed. Discussed the importance of compliance with prescriber's directions and Pt was instructed to report questions/concerns, as well as missed doses or discontinuation of medication by Pt.     Safety Plan in Place: No Pt denies SI/HI/SIB recent or current    Daily Functioning:  Since last report, Pt states symptoms are causing Mild difficulty in daily functioning.      Content Discussion:   Pt reports life updates since previous session. Pt identified current stressors. Pt acknowledged stressors within and outside of one's control. Pt identified successes and issues with utilizing coping mechanisms.  Pt states has been doing \"really good\" since snow last week. Pt states she got a new couch and she has enjoyed \"cuddling up\" and she \"sat around reading.\"  Pt states she took the opportunity to learn to [employer].  States since discussing this at last session, she and  are having a date night on Saturdays.  They cook dinner and eat together and then watch a show.  Pt has concluded that she feels she is one who instigates everything in the relationship, and Pt states accepting this is helpful and inhibits the resentment she was experiencing. Pt processed some feelings of helplessness over youngest son's mental health issues, and Pt's feelings were normalized and validated.     Counselor supported Pt in continued exploration of underlying belief systems which contribute to difficulty in connecting/vulnerability.  Through discussion, Pt identifies themes of preservation and overt emotional and " physical reactivity when physical and/or emotional statis is in question, even in low or perceived threatening situations. Counselor supported Pt in identifying past experiences and underlying beliefs which contribute to these feelings and reactions.  Pt was supported and encouraged in processing emotions related to frustrations with the expectations of self and others.  Pt was encouraged to identify cultural and nuclear familial contexts which contribute to these concerns.  Pt was receptive and engaged in conversation, and Pt reports this was beneficial and applicable to their situation.      Reviewed coping skills and encouraged Pt to continue to practicing coping skills daily when not under distress. Pt was praised for their attempts to decrease symptoms and mitigate activating events.    Clinical Maneuvering/Intervention:  CBT was utilized to encourage Pt to identify maladaptive thoughts and behaviors and replace with more affirming and positive.Pt encouraged to set and maintain appropriate and healthy boundaries with others. Pt was instructed to practice daily using appropriate and specific words to communicate feelings to others.  Motivational interviewing used to encourage Pt to identify strengths which can be utilized in working toward treatment goals. Pt encouraged to practice daily learned skills such as controlled breathing, grounding, and mindfulness. Pt was encouraged to ask for help from support persons to assist them in maintaining stability and alleviate symptoms. Discussed the importance of being one's own mental health advocate and to refrain from seeing the need to ask for help as a weakness. Pt was encouraged to formulate a plan of action to be more proactive in managing stressors and refrain from using reactive and automatic heightened emotional responses.     Solution-focused therapy employed to identify how Pt would like their life to be if they were to make positive changes. Pt encouraged to  identify effective coping skills and strengths they can use to continue utilizing those skills. Pt encouraged to discontinue utilizing non-effective coping mechanisms. Pt provided with feedback to highlight achievements and personal and other resources. Encouraged use of SMART goals    Assisted patient in processing above session content; acknowledged and normalized patient’s thoughts, feelings, and concerns.  Rationalized patient thought process regarding concerns presented at session.  Discussed triggers associated with patient's  anxiety  and depression  Also discussed coping skills for patient to implement such as mindfulness , increasing activity , and self care     Allowed patient to freely discuss issues without interruption or judgment. Provided safe, confidential environment to facilitate the development of positive therapeutic relationship and encourage open, honest communication. Assisted patient in identifying risk factors which would indicate the need for higher level of care including thoughts to harm self or others and/or self-harming behavior and encouraged patient to contact this office, call 911, or present to the nearest emergency room should any of these events occur. Discussed crisis intervention services and means to access. Patient adamantly and convincingly denies current suicidal or homicidal ideation or perceptual disturbance.    Assessment:     Patient appears to maintain relative stability as compared to their baseline.  However, patient persistently struggles with symptoms which continue to cause impairment in important areas of functioning.  As a result, they can be reasonably expected to continue to benefit from treatment and would likely be at increased risk for decompensation otherwise.      PHQ-Score Total:  PHQ-9 Total Score: PHQ-9 Depression Screening  Little interest or pleasure in doing things?  0   Feeling down, depressed, or hopeless?  0   PHQ-2 Total Score  0   Trouble  falling or staying asleep, or sleeping too much?     Feeling tired or having little energy?     Poor appetite or overeating?     Feeling bad about yourself - or that you are a failure or have let yourself or your family down?     Trouble concentrating on things, such as reading the newspaper or watching television?     Moving or speaking so slowly that other people could have noticed? Or the opposite - being so fidgety or restless that you have been moving around a lot more than usual?     Thoughts that you would be better off dead, or of hurting yourself in some way?     PHQ-9 Total Score     If you checked off any problems, how difficult have these problems made it for you to do your work, take care of things at home, or get along with other people?  None      Over the last two weeks, how often have you been bothered by the following problems?  Feeling nervous, anxious or on edge: Not at all  Not being able to stop or control worrying: Not at all  Worrying too much about different things: Not at all  Trouble Relaxing: Not at all  Being so restless that it is hard to sit still: Not at all  Becoming easily annoyed or irritable: Not at all  Feeling afraid as if something awful might happen: Not at all  EDITH 7 Total Score: 0  If you checked any problems, how difficult have these problems made it for you to do your work, take care of things at home, or get along with other people: Not difficult at all      Mental Status Exam:   Hygiene:   good  Cooperation:  Cooperative  Eye Contact:  Good  Psychomotor Behavior:  Appropriate  Affect:  Full range  Mood: normal  Speech:  Normal  Thought Process:  Goal directed  Thought Content:  Normal  Suicidal:  None  Homicidal: None  Hallucinations:  None  Delusion: None  Memory:  Intact  Orientation:  Grossly Intact  Reliability:  good  Insight:  Good  Judgement:  Good  Impulse Control:  Good  Physical/Medical Issues:  No        Functional Status: Mild impairment     Progress toward  goal: Not at goal    Prognosis: Good with continued therapeutic intervention        Plan:    Patient will continue in individual outpatient therapy with focus on improved functioning and coping skills, maintaining stability, and avoiding decompensation and the need for higher level of care.    Patient will adhere to medication regimen as prescribed and report any side effects. Patient will contact this office, call 911 or present to the nearest emergency room should suicidal or homicidal ideations occur. Provide Cognitive Behavioral Therapy and Solution Focused Therapy to improve functioning, maintain stability, and avoid decompensation and the need for higher level of care.     Return in about 3 weeks (around 2/5/2025).      VISIT DIAGNOSIS:    Diagnosis Plan   1. Major depressive disorder, recurrent episode, moderate        2. Generalized anxiety disorder         11:01 EST       This document has been electronically signed by EDWIN Hastings  January 15, 2025      Part of this note may be an electronic transcription/translation of spoken language to printed text using the Dragon Dictation System.

## 2025-01-20 ENCOUNTER — TELEMEDICINE (OUTPATIENT)
Dept: PSYCHIATRY | Facility: CLINIC | Age: 55
End: 2025-01-20
Payer: COMMERCIAL

## 2025-01-20 DIAGNOSIS — F41.0 GENERALIZED ANXIETY DISORDER WITH PANIC ATTACKS: ICD-10-CM

## 2025-01-20 DIAGNOSIS — F41.1 GENERALIZED ANXIETY DISORDER WITH PANIC ATTACKS: ICD-10-CM

## 2025-01-20 DIAGNOSIS — F33.1 MAJOR DEPRESSIVE DISORDER, RECURRENT EPISODE, MODERATE: Primary | ICD-10-CM

## 2025-01-20 PROCEDURE — 99214 OFFICE O/P EST MOD 30 MIN: CPT | Performed by: NURSE PRACTITIONER

## 2025-01-20 PROCEDURE — 96127 BRIEF EMOTIONAL/BEHAV ASSMT: CPT | Performed by: NURSE PRACTITIONER

## 2025-01-20 RX ORDER — CITALOPRAM HYDROBROMIDE 20 MG/1
20 TABLET ORAL DAILY
Qty: 90 TABLET | Refills: 0 | Status: SHIPPED | OUTPATIENT
Start: 2025-01-20

## 2025-01-20 RX ORDER — BUPROPION HYDROCHLORIDE 200 MG/1
200 TABLET, EXTENDED RELEASE ORAL EVERY MORNING
Qty: 90 TABLET | Refills: 0 | Status: SHIPPED | OUTPATIENT
Start: 2025-01-20

## 2025-01-20 NOTE — PATIENT INSTRUCTIONS
For concerns or needing assistance call the [facility] at [phone]  Continue Wellbutrin 200 mg SR tablet every morning  Continue with Celexa 20 mg daily

## 2025-01-20 NOTE — PROGRESS NOTES
"Mode of Visit: Video  Location of patient: -HOME-  Location of provider: +HOME+  You have chosen to receive care through a telehealth visit.  The patient has signed the video visit consent form.  The visit included audio and video interaction. No technical issues occurred during this visit.  Patient verbally confirmed consent for today's encounter  01/20/2025        Tammy Fowler is a 54 y.o. female who presents today for follow up    Chief Complaint:    \" Depression and anxiety\"    History of Present Illness:     History of Present Illness  Patient reported she is feeling better overall.  Her energy has improved, she is not as tired.  Appetite is no change.  Previous PHQ-9 was 9, today is scored at 0.  At previous encounter Wellbutrin was increased to 200 mg SR every morning.  Patient denied any side effects.  Continues with Celexa for anxiety and reports today a EDITH-7 score of 0 and previously was scored at 5.  Patient reports she does continue engage in regular psychotherapy about every 2 weeks and finds this effective.  Patient has been out of her Pamelor that is prescribed by her GI provider.  I did send this provider a message during patient's session to see if she could send in about a 30-day supply to hold the patient over until her appointment with that provider next month.            Patient Health Questionnaire-9 (PHQ-9) (Depression Screening Tool)  Little interest or pleasure in doing things? (Patient-Rptd) Not at all   Feeling down, depressed, or hopeless? (Patient-Rptd) Not at all   PHQ-2 Total Score (Patient-Rptd) 0   Trouble falling or staying asleep, or sleeping too much? (Patient-Rptd) Not at all   Feeling tired or having little energy? (Patient-Rptd) Not at all   Poor appetite or overeating? (Patient-Rptd) Not at all   Feeling bad about yourself - or that you are a failure or have let yourself or your family down? (Patient-Rptd) Not at all   Trouble concentrating on things, such as " reading the newspaper or watching television? (Patient-Rptd) Not at all   Moving or speaking so slowly that other people could have noticed? Or the opposite - being so fidgety or restless that you have been moving around a lot more than usual? (Patient-Rptd) Not at all   Thoughts that you would be better off dead, or of hurting yourself in some way? (Patient-Rptd) Not at all   PHQ-9 Total Score (Patient-Rptd) 0   If you checked off any problems, how difficult have these problems made it for you to do your work, take care of things at home, or get along with other people?           PHQ-9 Total Score: (Patient-Rptd) 0       Generalized Anxiety Disorder 7-Item (EDITH-7) Screening Tool  Feeling nervous, anxious or on edge: (Patient-Rptd) Not at all  Not being able to stop or control worrying: (Patient-Rptd) Not at all  Worrying too much about different things: (Patient-Rptd) Not at all  Trouble Relaxing: (Patient-Rptd) Not at all  Being so restless that it is hard to sit still: (Patient-Rptd) Not at all  Feeling afraid as if something awful might happen: (Patient-Rptd) Not at all  Becoming easily annoyed or irritable: (Patient-Rptd) Not at all  EDITH 7 Total Score: (Patient-Rptd) 0         The following portions of the patient's history were reviewed and updated as appropriate: allergies, current medications, past family history, past medical history, past social history, past surgical history and problem list.    Past Psychiatric History:  Began Treatment: Around age 20  Diagnoses:Depression and Anxiety  Psychiatrist: Most recent medication provider was Jayjay Burgess with [facility]  Therapist: Currently in regular psychotherapy with FELIPE Lunsford since March 2024.  Patient was engaged in regular psychotherapy prior to that, but that therapist resigned.  Admission History: Patient reported 1 admission to [facility] when she was 20 years old for suicidal ideation.  She reports she received therapy and medication  services and did outpatient for a while at [facility].  Medication Trials: Pamelor when she was admitted to [facility].  Most recently has been on Wellbutrin and Celexa and reports this combination has been effective.  She also was recently prescribed Pamelor by her gastroenterologist.  Self Harm: Denies  Suicide Attempts:Denies   Psychosis, Anxiety, Depression:  Patient did endorse postpartum depression with all 3 pregnancies, she reports she feels like it was worst after the third child birth.  She did not receive any treatment.    Past Medical History:  Past Medical History:   Diagnosis Date    Allergic     seasonal, foods, shellfish    Anxiety and depression     Arthritis     Cervical disc disorder     Chronic pain disorder     Degenerative disc disease    Closed fracture of coccyx 2013    Colon polyp 2018    Dr. Mares removed during colonoscopy    Dizziness     constant x 3 weeks    Headache     History of seasonal allergies     Hyperlipidemia     IBS (irritable bowel syndrome)     Lactose intolerance early     dairy causes bloating and gas    Low back pain     Lumbosacral disc disease     Meniere's disease     Panic disorder     Peripheral neuropathy     Psychiatric illness        Substance Abuse History:   Types:Denies all, including illicit       Social History:  Social History     Socioeconomic History    Marital status:      Spouse name: Bob    Number of children: 3    Years of education: BA    Highest education level: Bachelor's degree (e.g., BA, AB, BS)   Tobacco Use    Smoking status: Former     Current packs/day: 0.00     Average packs/day: 2.2 packs/day for 33.3 years (74.9 ttl pk-yrs)     Types: Cigarettes     Start date: 1983     Quit date: 2008     Years since quittin.0    Smokeless tobacco: Never    Tobacco comments:     stopped 5 years for pregnancies   Vaping Use    Vaping status: Never Used   Substance and Sexual Activity     Alcohol use: Not Currently     Alcohol/week: 2.0 standard drinks of alcohol     Comment: Social    Drug use: No    Sexual activity: Yes     Partners: Male     Birth control/protection: Post-menopausal, Vasectomy   Patient reports support network consisting of her .  Patient is gainfully employed, has a bachelor's degree, no history of  service, no history of legal issues.    Family History:  Family History   Problem Relation Age of Onset    Cancer Mother         Non-Hodgkins Lymphoma    Hypertension Mother     Atrial fibrillation Mother     Arthritis Mother         DDD    Heart disease Mother         A-fib    Hyperlipidemia Mother     Thyroid disease Mother         Hypothyroid    Cancer Father         Hodgkins Lymphoma and Non-Hodgkins Lymphoma at two separate times of his life    Heart attack Father     Stroke Father     Hypertension Brother     Arthritis Brother         DDD    Hyperlipidemia Brother     Asthma Daughter     Arthritis Brother         DDD    Heart disease Maternal Aunt         Congestive Heart Failure    Dementia Paternal Uncle     Malig Hyperthermia Neg Hx        Past Surgical History:  Past Surgical History:   Procedure Laterality Date    ABDOMINAL SURGERY  10/10/1996        ANTERIOR CERVICAL DISCECTOMY W/ FUSION N/A 2018    Procedure: C5-6 ANTERIOR CERVICAL DISCECTOMY FUSION WITH INSTRUMENTATION;  Surgeon: Porter Kendrick MD;  Location: [facility] OR;  Service: Neurosurgery    BACK SURGERY  2018    Cervical discectomy & fusion     SECTION      COLONOSCOPY N/A 2018    One 6 mm polyp in the ascending colon, NBIH.  PATH: Hyperplastic polyp     COLONOSCOPY  2018    COLONOSCOPY N/A 2023    Procedure: COLONOSCOPY to cecum ti with cold snare polypectomy;  Surgeon: Cristian Mares MD;  Location: [facility] ENDOSCOPY;  Service: Gastroenterology;  Laterality: N/A;  pre hx colon polyps  post polyp hemorrhoids    EPIDURAL BLOCK  2007    Series of 4  block for cervical ddd    EYE SURGERY  2001    Lasik    LASIK      NECK SURGERY      WISDOM TOOTH EXTRACTION         Problem List:  Patient Active Problem List   Diagnosis    Chronic diarrhea    Irritable bowel syndrome with diarrhea    Cervical radiculopathy    Lumbar radiculopathy    High risk medication use    History of adenomatous polyp of colon    Moderate episode of recurrent major depressive disorder    Generalized anxiety disorder with panic attacks       Allergy:   Allergies   Allergen Reactions    Cinnamon Other (See Comments) and Shortness Of Breath    Shellfish-Derived Products Hives        Current Medications:   Current Outpatient Medications   Medication Sig Dispense Refill    buPROPion SR (WELLBUTRIN SR) 200 MG 12 hr tablet Take 1 tablet by mouth Every Morning. 90 tablet 0    cetirizine (zyrTEC) 10 MG tablet Take 1 tablet by mouth Daily.      citalopram (CeleXA) 20 MG tablet Take 1 tablet by mouth Daily. 90 tablet 0    ALPRAZolam (Xanax) 0.25 MG tablet Take 1 tablet by mouth 2 (Two) Times a Day As Needed for Anxiety (panic). (Patient not taking: Reported on 10/8/2024) 60 tablet 0    FIBER PO Take  by mouth Every Morning.      nortriptyline (PAMELOR) 10 MG capsule TAKE TWO CAPSULES BY MOUTH ONCE NIGHTLY (Patient taking differently: Take 2 capsules by mouth Every Night. Pt is waiting on refill to be call in.  She will call provider after today's session.) 180 capsule 3    Omega-3 Fatty Acids (fish oil) 1000 MG capsule capsule Take  by mouth Daily With Breakfast.      pantoprazole (PROTONIX) 40 MG EC tablet TAKE 1 TABLET BY MOUTH DAILY 30 tablet 1    triamterene-hydrochlorothiazide (MAXZIDE-25) 37.5-25 MG per tablet Take 1 tablet by mouth Daily. 90 tablet 1     No current facility-administered medications for this visit.            Physical Exam:   Physical Exam  Constitutional:       Appearance: Normal appearance. She is well-developed and well-groomed.   HENT:      Head: Normocephalic.   Neurological:       Mental Status: She is alert.   Psychiatric:         Attention and Perception: Attention and perception normal.         Mood and Affect: Mood and affect normal.         Speech: Speech normal.         Behavior: Behavior normal. Behavior is cooperative.         Thought Content: Thought content normal.         Cognition and Memory: Cognition and memory normal.         Vitals:  not currently breastfeeding. There is no height or weight on file to calculate BMI.  Due to extenuating circumstances and possible current health risks associated with the patient being present in a clinical setting (with current health restrictions in place in regards to possible COVID 19 transmission/exposure), the patient was seen remotely today via a MyChart Video Visit through [facility] and telephone encounter.  Unable to obtain vital signs due to nature of remote visit.  Height stated at 62 inches.  Weight stated at 133 pounds.    Last 3 Blood Pressure Readings:  BP Readings from Last 3 Encounters:   03/29/24 130/78   07/07/23 119/88   04/20/23 98/68          Mental Status Exam:   Hygiene:   good  Cooperation:  Cooperative  Eye Contact:  Good  Psychomotor Behavior:  Appropriate  Affect:  Full range  Mood: euthymic  Hopelessness: Denies  Speech:  Normal  Thought Process:  Goal directed and Linear  Thought Content:  Normal  Suicidal:  None  Homicidal:  None  Hallucinations:  None  Delusion:  None  Memory:  Intact  Orientation:  Person, Place, Time, and Situation  Reliability:  good  Insight:  Good  Judgement:  Good  Impulse Control:  Good  Physical/Medical Issues:  Yes see medical history        Lab Results:   No visits with results within 6 Month(s) from this visit.   Latest known visit with results is:   Office Visit on 03/29/2024   Component Date Value Ref Range Status    WBC 03/29/2024 3.85  3.40 - 10.80 10*3/mm3 Final    RBC 03/29/2024 4.30  3.77 - 5.28 10*6/mm3 Final    Hemoglobin 03/29/2024 12.9  12.0 - 15.9 g/dL Final    Hematocrit  03/29/2024 40.2  34.0 - 46.6 % Final    MCV 03/29/2024 93.5  79.0 - 97.0 fL Final    MCH 03/29/2024 30.0  26.6 - 33.0 pg Final    MCHC 03/29/2024 32.1  31.5 - 35.7 g/dL Final    RDW 03/29/2024 12.5  12.3 - 15.4 % Final    Platelets 03/29/2024 255  140 - 450 10*3/mm3 Final    Neutrophil Rel % 03/29/2024 62.0  42.7 - 76.0 % Final    Lymphocyte Rel % 03/29/2024 26.0  19.6 - 45.3 % Final    Monocyte Rel % 03/29/2024 9.1  5.0 - 12.0 % Final    Eosinophil Rel % 03/29/2024 1.8  0.3 - 6.2 % Final    Basophil Rel % 03/29/2024 0.8  0.0 - 1.5 % Final    Neutrophils Absolute 03/29/2024 2.39  1.70 - 7.00 10*3/mm3 Final    Lymphocytes Absolute 03/29/2024 1.00  0.70 - 3.10 10*3/mm3 Final    Monocytes Absolute 03/29/2024 0.35  0.10 - 0.90 10*3/mm3 Final    Eosinophils Absolute 03/29/2024 0.07  0.00 - 0.40 10*3/mm3 Final    Basophils Absolute 03/29/2024 0.03  0.00 - 0.20 10*3/mm3 Final    Immature Granulocyte Rel % 03/29/2024 0.3  0.0 - 0.5 % Final    Immature Grans Absolute 03/29/2024 0.01  0.00 - 0.05 10*3/mm3 Final    nRBC 03/29/2024 0.0  0.0 - 0.2 /100 WBC Final    Glucose 03/29/2024 80  65 - 99 mg/dL Final    BUN 03/29/2024 11  6 - 20 mg/dL Final    Creatinine 03/29/2024 0.80  0.57 - 1.00 mg/dL Final    EGFR Result 03/29/2024 88.8  >60.0 mL/min/1.73 Final    Comment: GFR Normal >60  Chronic Kidney Disease <60  Kidney Failure <15      BUN/Creatinine Ratio 03/29/2024 13.8  7.0 - 25.0 Final    Sodium 03/29/2024 139  136 - 145 mmol/L Final    Potassium 03/29/2024 3.8  3.5 - 5.2 mmol/L Final    Chloride 03/29/2024 100  98 - 107 mmol/L Final    Total CO2 03/29/2024 31.2 (H)  22.0 - 29.0 mmol/L Final    Calcium 03/29/2024 9.4  8.6 - 10.5 mg/dL Final    Total Protein 03/29/2024 6.8  6.0 - 8.5 g/dL Final    Albumin 03/29/2024 4.4  3.5 - 5.2 g/dL Final    Globulin 03/29/2024 2.4  gm/dL Final    A/G Ratio 03/29/2024 1.8  g/dL Final    Total Bilirubin 03/29/2024 0.4  0.0 - 1.2 mg/dL Final    Alkaline Phosphatase 03/29/2024 96  39 - 117 U/L  Final    AST (SGOT) 03/29/2024 28  1 - 32 U/L Final    ALT (SGPT) 03/29/2024 16  1 - 33 U/L Final    Total Cholesterol 03/29/2024 230 (H)  0 - 200 mg/dL Final    Comment: Cholesterol Reference Ranges  (U.S. Department of Health and Human Services ATP III  Classifications)  Desirable          <200 mg/dL  Borderline High    200-239 mg/dL  High Risk          >240 mg/dL  Triglyceride Reference Ranges  (U.S. Department of Health and Human Services ATP III  Classifications)  Normal           <150 mg/dL  Borderline High  150-199 mg/dL  High             200-499 mg/dL  Very High        >500 mg/dL  HDL Reference Ranges  (U.S. Department of Health and Human Services ATP III  Classifications)  Low     <40 mg/dl (major risk factor for CHD)  High    >60 mg/dl ('negative' risk factor for CHD)  LDL Reference Ranges  (U.S. Department of Health and Human Services ATP III  Classifications)  Optimal          <100 mg/dL  Near Optimal     100-129 mg/dL  Borderline High  130-159 mg/dL  High             160-189 mg/dL  Very High        >189 mg/dL      Triglycerides 03/29/2024 87  0 - 150 mg/dL Final    HDL Cholesterol 03/29/2024 77 (H)  40 - 60 mg/dL Final    VLDL Cholesterol Rob 03/29/2024 15  5 - 40 mg/dL Final    LDL Chol Calc (NIH) 03/29/2024 138 (H)  0 - 100 mg/dL Final    TSH 03/29/2024 0.919  0.270 - 4.200 uIU/mL Final    Vitamin B-12 03/29/2024 584  211 - 946 pg/mL Final    Results may be falsely increased if patient taking Biotin.            Assessment & Plan   Diagnoses and all orders for this visit:    1. Major depressive disorder, recurrent episode, moderate (Primary)  -     citalopram (CeleXA) 20 MG tablet; Take 1 tablet by mouth Daily.  Dispense: 90 tablet; Refill: 0  -     buPROPion SR (WELLBUTRIN SR) 200 MG 12 hr tablet; Take 1 tablet by mouth Every Morning.  Dispense: 90 tablet; Refill: 0    2. Generalized anxiety disorder with panic attacks  -     citalopram (CeleXA) 20 MG tablet; Take 1 tablet by mouth Daily.  Dispense:  90 tablet; Refill: 0          Visit Diagnoses:    ICD-10-CM ICD-9-CM   1. Major depressive disorder, recurrent episode, moderate  F33.1 296.32   2. Generalized anxiety disorder with panic attacks  F41.1 300.02    F41.0 300.01           GOALS:  Short Term Goals: Patient will be compliant with medication, and patient will have no significant medication related side effects.  Patient will be engaged in psychotherapy as indicated.  Patient will report subjective improvement of symptoms.  Long term goals: To stabilize mood and treat/improve subjective symptoms, the patient will stay out of the hospital, the patient will be at an optimal level of functioning, and the patient will take all medications as prescribed.  The patient/guardian verbalized understanding and agreement with goals that were mutually set.    SUICIDE RISK ASSESSMENT AND SAFETY PLAN: Unalterable demographics and a history of mental health intervention indicate this patient is in a high risk category compared to the general population. At present, the patient denies active SI/HI, intentions, or plans at this time and agrees to seek immediate care should such thoughts develop. The patient verbalizes understanding of how to access emergency care if needed and agrees to do so. Consideration of suicide risk and protective factors such as history, current presentation, individual strengths and weaknesses, psychosocial and environmental stressors and variables, psychiatric illness and symptoms, medical conditions and pain, took place in this interview. Based on those considerations, the patient is determined: within individual baseline and presenting no imminent risk for suicide or homicide. Other recommendations: The patient does not meet the criteria for inpatient admission and is not a safety risk to self or others at today's visit. Inpatient treatment offers no significant advantages over outpatient treatment for this patient at today's visit.  The patient  was given ample time for questions and fully participated during the encounter/in treatment planning.  The patient was encouraged to call the clinic with any questions or concerns.  The patient was informed of access to emergency care. If patient were to develop any significant symptomatology, suicidal ideation, homicidal ideation, any concerns, or feel unsafe at any time they are to call the clinic and if unable to get immediate assistance should immediately call 911 or go to the nearest emergency room.  Patient contracted verbally for the following: If you are experiencing an emotional crisis or have thoughts of harming yourself or others, please go to your nearest local emergency room or call 911. Will continue to re-assess medication response and side effects frequently to establish efficacy and ensure safety. Risks, any black box warnings, side effects, off label usage, and benefits of medication and treatment discussed with patient, along with potential adverse side effects of current and/or newly prescribed medication, alternative treatment options, and OTC medications.  Patient verbalized understanding of potential risks, any off label use of medication, any black box warnings, and any side effects in their own words. The patient verbalized understanding and agreed to comply with the safety plan discussed in their own words.      TREATMENT PLAN: Continue supportive psychotherapy efforts and medications as indicated.   Pharmacological and Non-Pharmacological treatment options discussed during today's visit. Patient/Guardian acknowledged and verbally consented with current treatment plan and was educated on the importance of compliance with treatment and follow-up appointments.      MEDICATION ISSUES:  Discussed medication options and treatment plan of prescribed medication as well as the risks, benefits, any black box warnings, and side effects including potential falls, possible impaired driving, and  metabolic adversities among others. Patient is agreeable to call the office with any worsening of symptoms or onset of side effects, or if any concerns or questions arise.  The contact information for the office is made available to the patient. Patient is agreeable to call 911 or go to the nearest ER should they begin having any SI/HI, or if any urgent concerns arise. No medication side effects or related complaints today.     Continue Wellbutrin 200 mg SR tablet every morning  Continue with Celexa 20 mg daily    MEDS ORDERED DURING VISIT:  New Medications Ordered This Visit   Medications    citalopram (CeleXA) 20 MG tablet     Sig: Take 1 tablet by mouth Daily.     Dispense:  90 tablet     Refill:  0    buPROPion SR (WELLBUTRIN SR) 200 MG 12 hr tablet     Sig: Take 1 tablet by mouth Every Morning.     Dispense:  90 tablet     Refill:  0       Follow Up Appointment:   Return in about 14 weeks (around 4/28/2025) for Recheck, Video visit.               This document has been electronically signed by JAYDA Brown  January 20, 2025 14:14 EST    Dictated Utilizing Dragon Dictation: Part of this note may be an electronic transcription/translation of spoken language to printed text using the Dragon Dictation System.

## 2025-01-21 RX ORDER — NORTRIPTYLINE HYDROCHLORIDE 10 MG/1
20 CAPSULE ORAL NIGHTLY
Qty: 180 CAPSULE | Refills: 1 | Status: SHIPPED | OUTPATIENT
Start: 2025-01-21

## 2025-01-29 ENCOUNTER — TELEMEDICINE (OUTPATIENT)
Dept: PSYCHIATRY | Facility: CLINIC | Age: 55
End: 2025-01-29
Payer: COMMERCIAL

## 2025-01-29 DIAGNOSIS — F41.0 GENERALIZED ANXIETY DISORDER WITH PANIC ATTACKS: Primary | ICD-10-CM

## 2025-01-29 DIAGNOSIS — F41.1 GENERALIZED ANXIETY DISORDER WITH PANIC ATTACKS: Primary | ICD-10-CM

## 2025-01-29 NOTE — PROGRESS NOTES
Date: February 1, 2025  Time In: 10:05  Time out: 10:58      This provider is located at the [facility]through [facility]), [address], [place] [address] using a secure [employer] Video Visit through [employer]. Patient is being seen remotely via telehealth, and they are in a secure environment for this session. The patient's condition being diagnosed/treated is appropriate for telemedicine. The provider identified herself as well as her credentials. The patient, and/or patients guardian, consent to be seen remotely, and when consent is given they understand that the consent allows for patient identifiable information to be sent to a third party as needed. They may refuse to be seen remotely at any time. The electronic data is encrypted and password protected, and the patient and/or guardian has been advised of the potential risks to privacy not withstanding such measures.     Mode of Visit: Video  Location of patient: home  Location of provider: Provider home  You have chosen to receive care through a telehealth visit.  The patient has signed the video visit consent form.  The visit included audio and video interaction. No technical issues occurred during this visit    Subjective   Nicki Fowler is a 54 y.o. female who presents today fully oriented, appropriately dressed and groomed, and open to communication for follow up appointment.    Chief Complaint:   Chief Complaint   Patient presents with    Anxiety    Depression    Family Problem        History of Present Illness: Rapport was established through conversation and unconditional positive regard. Recent events were discussed and how these events impact Pt's emotional health.   Pt reports successful use of learned coping skills since last report.  Pt reports continuation of symptoms and states the intensity and duration of symptoms has remained unchanged since last report. Pt rates anxiety at 1/10 and depression at  "1/10.     Sleep: Pt reports sleep has remained unchanged since last report. Healthy sleep habits were discussed such as maintaining a schedule, routine, avoiding caffeine, and limiting screen time before bed.    Appetite:  Pt reports appetite has been good since last report.  Discussed the importance of hydration and eating a healthy diet for overall and mental health.    Medication compliance: Pt reports medications are being taken daily as prescribed. Discussed the importance of compliance with prescriber's directions and Pt was instructed to report questions/concerns, as well as missed doses or discontinuation of medication by Pt.     Safety Plan in Place: No Pt denies SI/HI/SIB recent or current    Daily Functioning:  Since last report, Pt states symptoms are causing Mild difficulty in daily functioning.      Content Discussion:   Pt reports life updates since previous session. Pt identified current stressors. Pt acknowledged stressors within and outside of one's control. Pt identified successes and issues with utilizing coping mechanisms.  Work has been anxious \"It's a lot\" and there have been changes in procedure that makes it more of a stressful situation.   Pt states she has been over extending herself with personal events.  Pt signed up to do a craft event at an art place and then the day of the event she decided not to attend.  Discussed diversifying interests.  Pt states she will refrain from considering fun events as \"tasks\" or \"appointments.\"      Counselor supported Pt in continued exploration of underlying belief systems which contribute to difficulty in connecting/vulnerability.  Through discussion, Pt identifies themes of preservation and overt emotional and physical reactivity when physical and/or emotional statis is in question, even in low or perceived threatening situations. Counselor supported Pt in identifying past experiences and underlying beliefs which contribute to these feelings and " reactions.  Pt was supported and encouraged in processing emotions related to frustrations with the expectations of self and others.  Pt was encouraged to identify cultural and nuclear familial contexts which contribute to these concerns.  Pt was receptive and engaged in conversation, and Pt reports this was beneficial and applicable to their situation.      Reviewed coping skills and encouraged Pt to continue to practicing coping skills daily when not under distress. Pt was praised for their attempts to decrease symptoms and mitigate activating events.    Clinical Maneuvering/Intervention:  CBT was utilized to encourage Pt to identify maladaptive thoughts and behaviors and replace with more affirming and positive.Pt encouraged to set and maintain appropriate and healthy boundaries with others. Pt was instructed to practice daily using appropriate and specific words to communicate feelings to others.  Motivational interviewing used to encourage Pt to identify strengths which can be utilized in working toward treatment goals. Pt encouraged to practice daily learned skills such as controlled breathing, grounding, and mindfulness. Pt was encouraged to ask for help from support persons to assist them in maintaining stability and alleviate symptoms. Discussed the importance of being one's own mental health advocate and to refrain from seeing the need to ask for help as a weakness. Pt was encouraged to formulate a plan of action to be more proactive in managing stressors and refrain from using reactive and automatic heightened emotional responses.     Solution-focused therapy employed to identify how Pt would like their life to be if they were to make positive changes. Pt encouraged to identify effective coping skills and strengths they can use to continue utilizing those skills. Pt encouraged to discontinue utilizing non-effective coping mechanisms. Pt provided with feedback to highlight achievements and personal and  other resources. Encouraged use of SMART goals    Assisted patient in processing above session content; acknowledged and normalized patient’s thoughts, feelings, and concerns.  Rationalized patient thought process regarding concerns presented at session.  Discussed triggers associated with patient's  anxiety  Also discussed coping skills for patient to implement such as grounding , increasing activity , and self care     Allowed patient to freely discuss issues without interruption or judgment. Provided safe, confidential environment to facilitate the development of positive therapeutic relationship and encourage open, honest communication. Assisted patient in identifying risk factors which would indicate the need for higher level of care including thoughts to harm self or others and/or self-harming behavior and encouraged patient to contact this office, call 911, or present to the nearest emergency room should any of these events occur. Discussed crisis intervention services and means to access. Patient adamantly and convincingly denies current suicidal or homicidal ideation or perceptual disturbance.    Assessment:     Patient appears to maintain relative stability as compared to their baseline.  However, patient persistently struggles with symptoms which continue to cause impairment in important areas of functioning.  As a result, they can be reasonably expected to continue to benefit from treatment and would likely be at increased risk for decompensation otherwise.        Mental Status Exam:  Hygiene:   good  Cooperation:  Cooperative  Eye Contact:  Good  Psychomotor Behavior:  Appropriate  Affect:  Full range  Mood: anxious  Speech:  Normal  Thought Process:  Goal directed  Thought Content:  Normal  Suicidal:  None  Homicidal: None  Hallucinations:  None  Delusion: None  Memory:  Intact  Orientation:  Grossly Intact  Reliability:  good  Insight:  Good  Judgement:  Good  Impulse Control:  Good  Physical/Medical  Issues:  No        Functional Status: Mild impairment     Progress toward goal: Not at goal    Prognosis: Good with continued therapeutic intervention        Plan:    Patient will continue in individual outpatient therapy with focus on improved functioning and coping skills, maintaining stability, and avoiding decompensation and the need for higher level of care.    Patient will adhere to medication regimen as prescribed and report any side effects. Patient will contact this office, call 911 or present to the nearest emergency room should suicidal or homicidal ideations occur. Provide Cognitive Behavioral Therapy and Solution Focused Therapy to improve functioning, maintain stability, and avoid decompensation and the need for higher level of care.     Return in about 2 weeks (around 2/12/2025).      VISIT DIAGNOSIS:    Diagnosis Plan   1. Generalized anxiety disorder with panic attacks         10:08 EST       This document has been electronically signed by EDWIN Hastings  February 1, 2025      Part of this note may be an electronic transcription/translation of spoken language to printed text using the Dragon Dictation System.

## 2025-02-12 ENCOUNTER — TELEMEDICINE (OUTPATIENT)
Dept: PSYCHIATRY | Facility: CLINIC | Age: 55
End: 2025-02-12
Payer: COMMERCIAL

## 2025-02-12 DIAGNOSIS — F41.0 GENERALIZED ANXIETY DISORDER WITH PANIC ATTACKS: Primary | ICD-10-CM

## 2025-02-12 DIAGNOSIS — F41.1 GENERALIZED ANXIETY DISORDER WITH PANIC ATTACKS: Primary | ICD-10-CM

## 2025-02-12 DIAGNOSIS — F33.1 MAJOR DEPRESSIVE DISORDER, RECURRENT EPISODE, MODERATE: ICD-10-CM

## 2025-02-12 NOTE — PROGRESS NOTES
Date: February 12, 2025  Time In: 10:09  Time out: 10:55      This provider is located at the [facility] ([facility]), [address], [place] [address] using a secure [facility] Video Visit through [employer]. Patient is being seen remotely via telehealth, and they are in a secure environment for this session. The patient's condition being diagnosed/treated is appropriate for telemedicine. The provider identified herself as well as her credentials. The patient, and/or patients guardian, consent to be seen remotely, and when consent is given they understand that the consent allows for patient identifiable information to be sent to a third party as needed. They may refuse to be seen remotely at any time. The electronic data is encrypted and password protected, and the patient and/or guardian has been advised of the potential risks to privacy not withstanding such measures.     Mode of Visit: Video  Location of patient: Home  Location of provider: Provider home  You have chosen to receive care through a telehealth visit.  The patient has signed the video visit consent form.  The visit included audio and video interaction. No technical issues occurred during this visit    Subjective   Nicki Fowler is a 54 y.o. female who presents today fully oriented, appropriately dressed and groomed, and open to communication for follow up appointment.    Chief Complaint:   Chief Complaint   Patient presents with    Anxiety    Depression        History of Present Illness: Rapport was established through conversation and unconditional positive regard. Recent events were discussed and how these events impact Pt's emotional health.   Pt reports successful use of learned coping skills since last report.  Pt reports continuation of symptoms and states the intensity and duration of symptoms has remained unchanged since last report. Pt rates anxiety at 2/10 and depression at 2/10.     Sleep: Pt  "reports sleep has worsened since last report. Healthy sleep habits were discussed such as maintaining a schedule, routine, avoiding caffeine, and limiting screen time before bed.  Reports she is having a really hard time getting to sleep.  In the morning Pt struggles with getting out of bed due to being tired.  \"I think it's from work stress.\"  \"I hate it\" Pt ruminates about work before sleep onset.    Appetite:  Pt reports appetite has been good since last report.  Discussed the importance of hydration and eating a healthy diet for overall and mental health.    Medication compliance: Pt reports medications are being taken daily as prescribed. Discussed the importance of compliance with prescriber's directions and Pt was instructed to report questions/concerns, as well as missed doses or discontinuation of medication by Pt.     Safety Plan in Place: No Pt denies SI/HI/SIB recent or current    Daily Functioning:  Since last report, Pt states symptoms are causing Mild difficulty in daily functioning.      Content Discussion:   Pt reports life updates since previous session. Pt identified current stressors. Pt acknowledged stressors within and outside of one's control. Pt identified successes and issues with utilizing coping mechanisms.  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  States in April she is running a race in [place] and has some vacation time she wants to use her paid time off before she departs her job.  States by May/June she wants to have something in place or in the works for alternate employment. Pt states she works for a small company and she and one other person are doing the jobs of five people.  Pt states she relies on others to get their work done in order for Pt to meet her deadlines, and this is extremely stressful.  Pt states she used to like her work and she does it well, but she no longer enjoys the job.  Pt will do some research by " next session to get ideas for future employment opportunities.  Pt will refrain from settling and will choose a rewarding field that is a good fit for Pt and family.     Counselor supported Pt in continued exploration of underlying belief systems which contribute to difficulty in connecting/vulnerability.  Through discussion, Pt identifies themes of preservation and overt emotional and physical reactivity when physical and/or emotional statis is in question, even in low or perceived threatening situations. Counselor supported Pt in identifying past experiences and underlying beliefs which contribute to these feelings and reactions.  Pt was supported and encouraged in processing emotions related to frustrations with the expectations of self and others.  Pt was encouraged to identify cultural and nuclear familial contexts which contribute to these concerns.  Pt was receptive and engaged in conversation, and Pt reports this was beneficial and applicable to their situation.      Reviewed coping skills and encouraged Pt to continue to practicing coping skills daily when not under distress. Pt was praised for their attempts to decrease symptoms and mitigate activating events.    Clinical Maneuvering/Intervention:  CBT was utilized to encourage Pt to identify maladaptive thoughts and behaviors and replace with more affirming and positive.Pt encouraged to set and maintain appropriate and healthy boundaries with others. Pt was instructed to practice daily using appropriate and specific words to communicate feelings to others.  Motivational interviewing used to encourage Pt to identify strengths which can be utilized in working toward treatment goals. Pt encouraged to practice daily learned skills such as controlled breathing, grounding, and mindfulness. Pt was encouraged to ask for help from support persons to assist them in maintaining stability and alleviate symptoms. Discussed the importance of being one's own mental  health advocate and to refrain from seeing the need to ask for help as a weakness. Pt was encouraged to formulate a plan of action to be more proactive in managing stressors and refrain from using reactive and automatic heightened emotional responses.     Solution-focused therapy employed to identify how Pt would like their life to be if they were to make positive changes. Pt encouraged to identify effective coping skills and strengths they can use to continue utilizing those skills. Pt encouraged to discontinue utilizing non-effective coping mechanisms. Pt provided with feedback to highlight achievements and personal and other resources. Encouraged use of SMART goals    Assisted patient in processing above session content; acknowledged and normalized patient’s thoughts, feelings, and concerns.  Rationalized patient thought process regarding concerns presented at session.  Discussed triggers associated with patient's  anxiety  Also discussed coping skills for patient to implement such as grounding  and self care     Allowed patient to freely discuss issues without interruption or judgment. Provided safe, confidential environment to facilitate the development of positive therapeutic relationship and encourage open, honest communication. Assisted patient in identifying risk factors which would indicate the need for higher level of care including thoughts to harm self or others and/or self-harming behavior and encouraged patient to contact this office, call 911, or present to the nearest emergency room should any of these events occur. Discussed crisis intervention services and means to access. Patient adamantly and convincingly denies current suicidal or homicidal ideation or perceptual disturbance.    Assessment:     Patient appears to maintain relative stability as compared to their baseline.  However, patient persistently struggles with symptoms which continue to cause impairment in important areas of functioning.   As a result, they can be reasonably expected to continue to benefit from treatment and would likely be at increased risk for decompensation otherwise.          Mental Status Exam:   Hygiene:   good  Cooperation:  Cooperative  Eye Contact:  Good  Psychomotor Behavior:  Appropriate  Affect:  Full range  Mood: normal  Speech:  Normal  Thought Process:  Goal directed  Thought Content:  Normal  Suicidal:  None  Homicidal: None  Hallucinations:  None  Delusion: None  Memory:  Intact  Orientation:  Grossly Intact  Reliability:  good  Insight:  Good  Judgement:  Good  Impulse Control:  Good  Physical/Medical Issues:  No        Functional Status: Mild impairment     Progress toward goal: Not at goal    Prognosis: Good with continued therapeutic intervention        Plan:    Patient will continue in individual outpatient therapy with focus on improved functioning and coping skills, maintaining stability, and avoiding decompensation and the need for higher level of care.    Patient will adhere to medication regimen as prescribed and report any side effects. Patient will contact this office, call 911 or present to the nearest emergency room should suicidal or homicidal ideations occur. Provide Cognitive Behavioral Therapy and Solution Focused Therapy to improve functioning, maintain stability, and avoid decompensation and the need for higher level of care.     Follow up in two weeks.      VISIT DIAGNOSIS:    Diagnosis Plan   1. Generalized anxiety disorder with panic attacks        2. Major depressive disorder, recurrent episode, moderate         10:09 EST       This document has been electronically signed by EDWIN Hastings  February 12, 2025      Part of this note may be an electronic transcription/translation of spoken language to printed text using the Dragon Dictation System.

## 2025-02-17 DIAGNOSIS — R12 HEARTBURN: ICD-10-CM

## 2025-02-17 RX ORDER — PANTOPRAZOLE SODIUM 40 MG/1
40 TABLET, DELAYED RELEASE ORAL DAILY
Qty: 30 TABLET | Refills: 1 | Status: SHIPPED | OUTPATIENT
Start: 2025-02-17

## 2025-03-05 ENCOUNTER — TELEMEDICINE (OUTPATIENT)
Dept: PSYCHIATRY | Facility: CLINIC | Age: 55
End: 2025-03-05
Payer: COMMERCIAL

## 2025-03-05 DIAGNOSIS — F41.1 GENERALIZED ANXIETY DISORDER WITH PANIC ATTACKS: Primary | ICD-10-CM

## 2025-03-05 DIAGNOSIS — F41.0 GENERALIZED ANXIETY DISORDER WITH PANIC ATTACKS: Primary | ICD-10-CM

## 2025-03-05 NOTE — PROGRESS NOTES
Date: March 8, 2025  Time In:10:09  Time out: 11:02      This provider is located at the [facility]), [address], [place] [address] using a secure [employer] Video Visit through [employer]. Patient is being seen remotely via telehealth, and they are in a secure environment for this session. The patient's condition being diagnosed/treated is appropriate for telemedicine. The provider identified herself as well as her credentials. The patient, and/or patients guardian, consent to be seen remotely, and when consent is given they understand that the consent allows for patient identifiable information to be sent to a third party as needed. They may refuse to be seen remotely at any time. The electronic data is encrypted and password protected, and the patient and/or guardian has been advised of the potential risks to privacy not withstanding such measures.     Mode of Visit: Video  Location of patient: Home  Location of provider: Provider home  You have chosen to receive care through a telehealth visit.  The patient has signed the video visit consent form.  The visit included audio and video interaction. No technical issues occurred during this visit    Subjective   Nicki Fowler is a 54 y.o. female who presents today fully oriented, appropriately dressed and groomed, and open to communication for follow up appointment.    Chief Complaint: No chief complaint on file.       History of Present Illness: Rapport was established through conversation and unconditional positive regard. Recent events were discussed and how these events impact Pt's emotional health.   Pt reports successful use of learned coping skills since last report.  Pt reports continuation of symptoms and states the intensity and duration of symptoms has remained unchanged since last report. Pt rates anxiety at 3/10 and depression at 4/10.     Sleep: Pt reports sleep has remained unchanged since  "last report. Healthy sleep habits were discussed such as maintaining a schedule, routine, avoiding caffeine, and limiting screen time before bed.    Appetite:  Pt reports appetite has been good since last report.  Discussed the importance of hydration and eating a healthy diet for overall and mental health.    Medication compliance: Pt reports medications are being taken daily as prescribed. Discussed the importance of compliance with prescriber's directions and Pt was instructed to report questions/concerns, as well as missed doses or discontinuation of medication by Pt.     Safety Plan in Place: No Pt denies SI/HI/SIB recent or current    Daily Functioning:  Since last report, Pt states symptoms are causing Moderate difficulty in daily functioning.      Content Discussion:   Pt reports life updates since previous session. Pt identified current stressors. Pt acknowledged stressors within and outside of one's control. Pt identified successes and issues with utilizing coping mechanisms.  Pt states she continues to be angry at work due to her company changing policy and personnel.  Pt states has been researching career interests and ways she can find enjoyable work preferably that she can do at home. Pt states she feels overwhelmed by feeling a sense of responsibility for her adult children, her work, and keeping up with the household.  Discussed the importance of not only self care and doing enjoyable activities, but also having leisure and \"down time\"  pt agrees this is something she needs to work on and will do so.  Pt was allowed to process some feelings about tensions in her work group and immature co-workers, and Pt states she hopes to find new employment by the end of summer. States she may ramp up her search for different fields and opportunities.      Counselor supported Pt in continued exploration of underlying belief systems which contribute to difficulty in connecting/vulnerability.  Through discussion, Pt " identifies themes of preservation and overt emotional and physical reactivity when physical and/or emotional statis is in question, even in low or perceived threatening situations. Counselor supported Pt in identifying past experiences and underlying beliefs which contribute to these feelings and reactions.  Pt was supported and encouraged in processing emotions related to frustrations with the expectations of self and others.  Pt was encouraged to identify cultural and nuclear familial contexts which contribute to these concerns.  Pt was receptive and engaged in conversation, and Pt reports this was beneficial and applicable to their situation.      Reviewed coping skills and encouraged Pt to continue to practicing coping skills daily when not under distress. Pt was praised for their attempts to decrease symptoms and mitigate activating events.    Clinical Maneuvering/Intervention:  CBT was utilized to encourage Pt to identify maladaptive thoughts and behaviors and replace with more affirming and positive.Pt encouraged to set and maintain appropriate and healthy boundaries with others. Pt was instructed to practice daily using appropriate and specific words to communicate feelings to others.  Motivational interviewing used to encourage Pt to identify strengths which can be utilized in working toward treatment goals. Pt encouraged to practice daily learned skills such as controlled breathing, grounding, and mindfulness. Pt was encouraged to ask for help from support persons to assist them in maintaining stability and alleviate symptoms. Discussed the importance of being one's own mental health advocate and to refrain from seeing the need to ask for help as a weakness. Pt was encouraged to formulate a plan of action to be more proactive in managing stressors and refrain from using reactive and automatic heightened emotional responses.     Solution-focused therapy employed to identify how Pt would like their life to  be if they were to make positive changes. Pt encouraged to identify effective coping skills and strengths they can use to continue utilizing those skills. Pt encouraged to discontinue utilizing non-effective coping mechanisms. Pt provided with feedback to highlight achievements and personal and other resources. Encouraged use of SMART goals    Assisted patient in processing above session content; acknowledged and normalized patient’s thoughts, feelings, and concerns.  Rationalized patient thought process regarding concerns presented at session.  Discussed triggers associated with patient's  anxiety  and depression  Also discussed coping skills for patient to implement such as mindfulness , self care , and positive self talk     Allowed patient to freely discuss issues without interruption or judgment. Provided safe, confidential environment to facilitate the development of positive therapeutic relationship and encourage open, honest communication. Assisted patient in identifying risk factors which would indicate the need for higher level of care including thoughts to harm self or others and/or self-harming behavior and encouraged patient to contact this office, call 911, or present to the nearest emergency room should any of these events occur. Discussed crisis intervention services and means to access. Patient adamantly and convincingly denies current suicidal or homicidal ideation or perceptual disturbance.    Assessment:     Patient appears to maintain relative stability as compared to their baseline.  However, patient persistently struggles with symptoms which continue to cause impairment in important areas of functioning.  As a result, they can be reasonably expected to continue to benefit from treatment and would likely be at increased risk for decompensation otherwise.          Mental Status Exam:   Hygiene:   good  Cooperation:  Cooperative  Eye Contact:  Good  Psychomotor Behavior:  Appropriate  Affect:   Full range  Mood: anxious  Speech:  Normal  Thought Process:  Goal directed  Thought Content:  Normal  Suicidal:  None  Homicidal: None  Hallucinations:  None  Delusion: None  Memory:  Intact  Orientation:  Grossly Intact  Reliability:  good  Insight:  Good  Judgement:  Good  Impulse Control:  Good  Physical/Medical Issues:  No        Functional Status: Mild impairment     Progress toward goal: Not at goal    Prognosis: Good with continued therapeutic intervention        Plan:    Patient will continue in individual outpatient therapy with focus on improved functioning and coping skills, maintaining stability, and avoiding decompensation and the need for higher level of care.    Patient will adhere to medication regimen as prescribed and report any side effects. Patient will contact this office, call 911 or present to the nearest emergency room should suicidal or homicidal ideations occur. Provide Cognitive Behavioral Therapy and Solution Focused Therapy to improve functioning, maintain stability, and avoid decompensation and the need for higher level of care.     Return in about 2 weeks (around 3/19/2025).      VISIT DIAGNOSIS:    Diagnosis Plan   1. Generalized anxiety disorder with panic attacks         10:09 EST       This document has been electronically signed by EDWIN Hastings  March 8, 2025      Part of this note may be an electronic transcription/translation of spoken language to printed text using the Dragon Dictation System.

## 2025-03-26 ENCOUNTER — TELEMEDICINE (OUTPATIENT)
Dept: PSYCHIATRY | Facility: CLINIC | Age: 55
End: 2025-03-26
Payer: COMMERCIAL

## 2025-03-26 DIAGNOSIS — F33.1 MAJOR DEPRESSIVE DISORDER, RECURRENT EPISODE, MODERATE: ICD-10-CM

## 2025-03-26 DIAGNOSIS — F41.0 GENERALIZED ANXIETY DISORDER WITH PANIC ATTACKS: Primary | ICD-10-CM

## 2025-03-26 DIAGNOSIS — F41.1 GENERALIZED ANXIETY DISORDER WITH PANIC ATTACKS: Primary | ICD-10-CM

## 2025-03-26 NOTE — PROGRESS NOTES
Date: March 26, 2025  Time In: 10:10  Time out: 11:08      This provider is located at the [facility]), [address], [place] [address] using a secure [employer] Video Visit through [employer]. Patient is being seen remotely via telehealth, and they are in a secure environment for this session. The patient's condition being diagnosed/treated is appropriate for telemedicine. The provider identified herself as well as her credentials. The patient, and/or patients guardian, consent to be seen remotely, and when consent is given they understand that the consent allows for patient identifiable information to be sent to a third party as needed. They may refuse to be seen remotely at any time. The electronic data is encrypted and password protected, and the patient and/or guardian has been advised of the potential risks to privacy not withstanding such measures.     Mode of Visit: Video  Location of patient: home  Location of provider: Provider home  You have chosen to receive care through a telehealth visit.  The patient has signed the video visit consent form.  The visit included audio and video interaction. No technical issues occurred during this visit    Subjective   Nicki Fowler is a 54 y.o. female who presents today fully oriented, appropriately dressed and groomed, and open to communication for follow up appointment.    Chief Complaint:   Chief Complaint   Patient presents with    Anxiety    Depression        History of Present Illness: Rapport was established through conversation and unconditional positive regard. Recent events were discussed and how these events impact Pt's emotional health.   Pt reports successful use of learned coping skills since last report.  Pt reports continuation of symptoms and states the intensity and duration of symptoms has remained unchanged since last report. Pt rates anxiety at 4/10 and depression at 2/10.     Sleep: Pt  "reports sleep has remained unchanged since last report. Healthy sleep habits were discussed such as maintaining a schedule, routine, avoiding caffeine, and limiting screen time before bed.    Appetite:  Pt reports appetite has been good since last report.  Discussed the importance of hydration and eating a healthy diet for overall and mental health.    Medication compliance: Pt reports medications are being taken daily as prescribed. Discussed the importance of compliance with prescriber's directions and Pt was instructed to report questions/concerns, as well as missed doses or discontinuation of medication by Pt.     Safety Plan in Place: No Pt denies SI/HI/SIB recent or current    Daily Functioning:  Since last report, Pt states symptoms are causing Mild difficulty in daily functioning.      Content Discussion:   Pt reports life updates since previous session. Pt identified current stressors. Pt acknowledged stressors within and outside of one's control. Pt identified successes and issues with utilizing coping mechanisms.  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  Pt states there is no scheduled class and she can fit the course work into her work day and evenings.  Pt states she is keeping her options open as to how she wants to use the certificate.  Discussed strategies for going back to school and memorizing terms.  Pt states adult son is doing well and \"he is ok\"  Reports home is \"going pretty good\" right now and Pt is happy with things \"just being ok\"  Pt reports she often wonders if she is enabling her son by allowing him to stay rent free in their rental home and giving him an allowance.  Discussed how a few years ago and in son's teen years ago the goal was to just \"be okay\"  Pt states she will see this period of peace as him reaching a goal of a baseline, and this is good.    Counselor supported Pt in continued exploration of underlying " belief systems which contribute to difficulty in connecting/vulnerability.  Through discussion, Pt identifies themes of preservation and overt emotional and physical reactivity when physical and/or emotional statis is in question, even in low or perceived threatening situations. Counselor supported Pt in identifying past experiences and underlying beliefs which contribute to these feelings and reactions.  Pt was supported and encouraged in processing emotions related to frustrations with the expectations of self and others.  Pt was encouraged to identify cultural and nuclear familial contexts which contribute to these concerns.  Pt was receptive and engaged in conversation, and Pt reports this was beneficial and applicable to their situation.      Reviewed coping skills and encouraged Pt to continue to practicing coping skills daily when not under distress. Pt was praised for their attempts to decrease symptoms and mitigate activating events.    Clinical Maneuvering/Intervention:  CBT was utilized to encourage Pt to identify maladaptive thoughts and behaviors and replace with more affirming and positive.Pt encouraged to set and maintain appropriate and healthy boundaries with others. Pt was instructed to practice daily using appropriate and specific words to communicate feelings to others.  Motivational interviewing used to encourage Pt to identify strengths which can be utilized in working toward treatment goals. Pt encouraged to practice daily learned skills such as controlled breathing, grounding, and mindfulness. Pt was encouraged to ask for help from support persons to assist them in maintaining stability and alleviate symptoms. Discussed the importance of being one's own mental health advocate and to refrain from seeing the need to ask for help as a weakness. Pt was encouraged to formulate a plan of action to be more proactive in managing stressors and refrain from using reactive and automatic heightened  emotional responses.     Solution-focused therapy employed to identify how Pt would like their life to be if they were to make positive changes. Pt encouraged to identify effective coping skills and strengths they can use to continue utilizing those skills. Pt encouraged to discontinue utilizing non-effective coping mechanisms. Pt provided with feedback to highlight achievements and personal and other resources. Encouraged use of SMART goals    Assisted patient in processing above session content; acknowledged and normalized patient’s thoughts, feelings, and concerns.  Rationalized patient thought process regarding concerns presented at session.  Discussed triggers associated with patient's  anxiety  and depression  Also discussed coping skills for patient to implement such as mindfulness , self care , and positive self talk     Allowed patient to freely discuss issues without interruption or judgment. Provided safe, confidential environment to facilitate the development of positive therapeutic relationship and encourage open, honest communication. Assisted patient in identifying risk factors which would indicate the need for higher level of care including thoughts to harm self or others and/or self-harming behavior and encouraged patient to contact this office, call 911, or present to the nearest emergency room should any of these events occur. Discussed crisis intervention services and means to access. Patient adamantly and convincingly denies current suicidal or homicidal ideation or perceptual disturbance.    Assessment:     Patient appears to maintain relative stability as compared to their baseline.  However, patient persistently struggles with symptoms which continue to cause impairment in important areas of functioning.  As a result, they can be reasonably expected to continue to benefit from treatment and would likely be at increased risk for decompensation otherwise.    Mental Status Exam:   Hygiene:    good  Cooperation:  Cooperative  Eye Contact:  Good  Psychomotor Behavior:  Appropriate  Affect:  Full range  Mood: normal  Speech:  Normal  Thought Process:  Goal directed  Thought Content:  Normal  Suicidal:  None  Homicidal: None  Hallucinations:  None  Delusion: None  Memory:  Intact  Orientation:  Grossly Intact  Reliability:  good  Insight:  Good  Judgement:  Good  Impulse Control:  Good  Physical/Medical Issues:  No        Functional Status: Mild impairment     Progress toward goal: Not at goal    Prognosis: Good with continued therapeutic intervention        Plan:    Patient will continue in individual outpatient therapy with focus on improved functioning and coping skills, maintaining stability, and avoiding decompensation and the need for higher level of care.    Patient will adhere to medication regimen as prescribed and report any side effects. Patient will contact this office, call 911 or present to the nearest emergency room should suicidal or homicidal ideations occur. Provide Cognitive Behavioral Therapy and Solution Focused Therapy to improve functioning, maintain stability, and avoid decompensation and the need for higher level of care.     Return in about 2 weeks (around 4/9/2025).      VISIT DIAGNOSIS:    Diagnosis Plan   1. Generalized anxiety disorder with panic attacks        2. Major depressive disorder, recurrent episode, moderate         10:10 EDT       This document has been electronically signed by EDWIN Hastings  March 26, 2025      Part of this note may be an electronic transcription/translation of spoken language to printed text using the Dragon Dictation System.

## 2025-04-17 DIAGNOSIS — R12 HEARTBURN: ICD-10-CM

## 2025-04-17 RX ORDER — PANTOPRAZOLE SODIUM 40 MG/1
40 TABLET, DELAYED RELEASE ORAL DAILY
Qty: 30 TABLET | Refills: 1 | Status: SHIPPED | OUTPATIENT
Start: 2025-04-17

## 2025-04-24 RX ORDER — TRIAMTERENE AND HYDROCHLOROTHIAZIDE 37.5; 25 MG/1; MG/1
1 TABLET ORAL DAILY
Qty: 90 TABLET | Refills: 0 | Status: SHIPPED | OUTPATIENT
Start: 2025-04-24

## 2025-04-24 NOTE — TELEPHONE ENCOUNTER
Rx Refill Note  Requested Prescriptions     Pending Prescriptions Disp Refills    triamterene-hydrochlorothiazide (MAXZIDE-25) 37.5-25 MG per tablet 90 tablet 1     Sig: Take 1 tablet by mouth Daily.      Last office visit with prescribing clinician: 3/29/2024   Last telemedicine visit with prescribing clinician: Visit date not found   Next office visit with prescribing clinician: 6/9/2025       Dawson Mansfield  04/24/25, 14:21 EDT

## 2025-04-28 ENCOUNTER — TELEMEDICINE (OUTPATIENT)
Dept: PSYCHIATRY | Facility: CLINIC | Age: 55
End: 2025-04-28
Payer: COMMERCIAL

## 2025-04-28 ENCOUNTER — OFFICE VISIT (OUTPATIENT)
Dept: GASTROENTEROLOGY | Facility: CLINIC | Age: 55
End: 2025-04-28
Payer: COMMERCIAL

## 2025-04-28 VITALS
TEMPERATURE: 98.4 F | WEIGHT: 142 LBS | SYSTOLIC BLOOD PRESSURE: 103 MMHG | DIASTOLIC BLOOD PRESSURE: 69 MMHG | HEIGHT: 62 IN | HEART RATE: 82 BPM | BODY MASS INDEX: 26.13 KG/M2

## 2025-04-28 DIAGNOSIS — F33.1 MAJOR DEPRESSIVE DISORDER, RECURRENT EPISODE, MODERATE: Primary | ICD-10-CM

## 2025-04-28 DIAGNOSIS — F41.0 GENERALIZED ANXIETY DISORDER WITH PANIC ATTACKS: ICD-10-CM

## 2025-04-28 DIAGNOSIS — F41.1 GENERALIZED ANXIETY DISORDER WITH PANIC ATTACKS: ICD-10-CM

## 2025-04-28 DIAGNOSIS — Z86.0100 PERSONAL HISTORY OF COLON POLYPS, UNSPECIFIED: ICD-10-CM

## 2025-04-28 DIAGNOSIS — K58.0 IRRITABLE BOWEL SYNDROME WITH DIARRHEA: Primary | ICD-10-CM

## 2025-04-28 DIAGNOSIS — K21.9 GASTROESOPHAGEAL REFLUX DISEASE WITHOUT ESOPHAGITIS: ICD-10-CM

## 2025-04-28 PROCEDURE — 99214 OFFICE O/P EST MOD 30 MIN: CPT | Performed by: NURSE PRACTITIONER

## 2025-04-28 PROCEDURE — 96127 BRIEF EMOTIONAL/BEHAV ASSMT: CPT | Performed by: NURSE PRACTITIONER

## 2025-04-28 RX ORDER — CITALOPRAM HYDROBROMIDE 20 MG/1
20 TABLET ORAL DAILY
Qty: 90 TABLET | Refills: 3 | Status: SHIPPED | OUTPATIENT
Start: 2025-04-28

## 2025-04-28 RX ORDER — NORTRIPTYLINE HYDROCHLORIDE 10 MG/1
10 CAPSULE ORAL NIGHTLY
Qty: 90 CAPSULE | Refills: 3 | Status: SHIPPED | OUTPATIENT
Start: 2025-04-28

## 2025-04-28 RX ORDER — BUPROPION HYDROCHLORIDE 200 MG/1
200 TABLET, EXTENDED RELEASE ORAL EVERY MORNING
Qty: 90 TABLET | Refills: 3 | Status: SHIPPED | OUTPATIENT
Start: 2025-04-28

## 2025-04-28 NOTE — PROGRESS NOTES
Chief Complaint   Patient presents with    Irritable Bowel Syndrome       HPI    Nicki Fowler is a  54 y.o. female here for a follow up visit for irritable bowel syndrome.    This patient will follow with Dr. Jaffe, known to me.    Past medical history of anxiety, depression, arthritis, chronic pain disorder, claustrophobia, headaches, hyperlipidemia, lactose intolerance, Ménière's disease along with panic disorder.    Patient reports more constipation as of late is prone to alternate between diarrhea and constipation.  She is currently taking Pamelor 20 mg at bedtime.  Associate abdominal cramps without rectal bleeding or rectal pain.    She reports adequate control of heartburn symptoms on once daily PPI therapy.  No upper GI complaints.    Recent lab work reviewed with normal hemoglobin, LFTs and TSH.    Endoscopic history reviewed:    COLONOSCOPY (2023 08:39) - Normal ileum.  Polyp in the sigmoid colon.  Hemorrhoids.  Recall 5 years.    Past Medical History:   Diagnosis Date    Allergic 1980    seasonal, foods, shellfish    Anxiety and depression     Arthritis     Cervical disc disorder     Chronic diarrhea     IBS-D    Chronic pain disorder     Degenerative disc disease    Claustrophobia     Closed fracture of coccyx 2013    Colon polyp 2018    Dr. Mares removed during colonoscopy    Dizziness     constant x 3 weeks    Headache     History of seasonal allergies     Hyperlipidemia     IBS (irritable bowel syndrome)     Injury of back 2013    broken tailbone    Injury of neck     whiplash/DDD found    Lactose intolerance early     dairy causes bloating and gas    Low back pain     Lumbosacral disc disease     Meniere's disease     Panic disorder     Peripheral neuropathy     Psychiatric illness        Past Surgical History:   Procedure Laterality Date    ABDOMINAL SURGERY  10/10/1996        ANTERIOR CERVICAL DISCECTOMY W/ FUSION N/A 2018    Procedure:  C5-6 ANTERIOR CERVICAL DISCECTOMY FUSION WITH INSTRUMENTATION;  Surgeon: Porter Kendrick MD;  Location: [facility] MAIN OR;  Service: Neurosurgery    BACK SURGERY  2018    Cervical discectomy & fusion     SECTION      COLONOSCOPY N/A 2018    One 6 mm polyp in the ascending colon, NBIH.  PATH: Hyperplastic polyp     COLONOSCOPY  2018    COLONOSCOPY N/A 2023    Procedure: COLONOSCOPY to cecum ti with cold snare polypectomy;  Surgeon: Cristian Mares MD;  Location: [facility] ENDOSCOPY;  Service: Gastroenterology;  Laterality: N/A;  pre hx colon polyps  post polyp hemorrhoids    EPIDURAL BLOCK      Series of 4 block for cervical ddd    EYE SURGERY      Lasik    LASIK      NECK SURGERY      SPINAL FUSION  2018    WISDOM TOOTH EXTRACTION         Scheduled Meds:     Continuous Infusions: No current facility-administered medications for this visit.      PRN Meds:     Allergies   Allergen Reactions    Cinnamon Other (See Comments) and Shortness Of Breath    Shellfish-Derived Products Hives       Social History     Socioeconomic History    Marital status:      Spouse name: Bob    Number of children: 3    Years of education: BA    Highest education level: Bachelor's degree (e.g., BA, AB, BS)   Tobacco Use    Smoking status: Former     Current packs/day: 0.00     Average packs/day: 2.2 packs/day for 33.3 years (74.9 ttl pk-yrs)     Types: Cigarettes     Start date: 1983     Quit date: 2008     Years since quittin.3    Smokeless tobacco: Never    Tobacco comments:     stopped 5 years for pregnancies   Vaping Use    Vaping status: Never Used   Substance and Sexual Activity    Alcohol use: Not Currently     Alcohol/week: 2.0 standard drinks of alcohol     Comment: Social    Drug use: No    Sexual activity: Yes     Partners: Male     Birth control/protection: Post-menopausal, Vasectomy       Family History   Problem Relation Age of Onset    Cancer Mother         Non-Hodgkins  Lymphoma    Hypertension Mother     Atrial fibrillation Mother     Arthritis Mother         DDD    Heart disease Mother         A-fib    Hyperlipidemia Mother     Thyroid disease Mother         Hypothyroid    Cancer Father         Hodgkins Lymphoma and Non-Hodgkins Lymphoma at two separate times of his life    Heart attack Father     Stroke Father     Hypertension Brother     Arthritis Brother         DDD    Hyperlipidemia Brother     Asthma Daughter     Arthritis Brother         DDD    Heart disease Maternal Aunt         Congestive Heart Failure    Dementia Paternal Uncle     Malig Hyperthermia Neg Hx        Review of Systems   Gastrointestinal:  Positive for constipation and diarrhea.       Vitals:    04/28/25 0813   BP: 103/69   Pulse: 82   Temp: 98.4 °F (36.9 °C)       Physical Exam  Constitutional:       Appearance: She is well-developed.   Abdominal:      General: Bowel sounds are normal. There is no distension.      Palpations: Abdomen is soft. There is no mass.      Tenderness: There is no abdominal tenderness. There is no guarding.      Hernia: No hernia is present.   Skin:     General: Skin is warm and dry.      Capillary Refill: Capillary refill takes less than 2 seconds.   Neurological:      Mental Status: She is alert and oriented to person, place, and time.   Psychiatric:         Behavior: Behavior normal.     Assessment    Diagnoses and all orders for this visit:    1. Irritable bowel syndrome with diarrhea (Primary)    2. Gastroesophageal reflux disease without esophagitis    3. Personal history of colon polyps, unspecified    Other orders  -     nortriptyline (PAMELOR) 10 MG capsule; Take 1 capsule by mouth Every Night.  Dispense: 90 capsule; Refill: 3  -     ultra-purified peppermint oil (IBGARD) 90 mg capsule capsule; Take 2 capsules by mouth 3 (Three) Times a Day As Needed (abdominal pain/diarrhea).    Plan    Decrease Pamelor to 10 mg once daily and monitor for improvement in bowel pattern as  patient tending towards more constipation than diarrhea  Encouraged high-fiber diet and high-fiber supplement  Use IBgard as needed-samples with dosing instructions provided  Continue PPI therapy  RTC 1 year or sooner if needed         JAYDA Marinelli  [facility]  [address] [place] [address] [id number]  [place] [address]  Office: [phone]    I spent 30 minutes caring for Nicki on this date of service. This time includes time spent by me in the following activities: preparing for the visit, reviewing tests, obtaining and/or reviewing a separately obtained history, performing a medically appropriate examination and/or evaluation , counseling and educating the patient/family/caregiver, ordering medications, tests, or procedures, documenting information in the medical record, independently interpreting results and communicating that information with the patient/family/caregiver and care coordination.

## 2025-04-28 NOTE — PATIENT INSTRUCTIONS
For concerns or needing assistance call the [facility] at [phone]  Continue Wellbutrin 200 mg SR tablet every morning  Continue with Celexa 20 mg daily

## 2025-04-28 NOTE — PROGRESS NOTES
"Mode of Visit: Video  Location of patient: -HOME-  Location of provider: +HOME+  You have chosen to receive care through a telehealth visit.  The patient has signed the video visit consent form.  The visit included audio and video interaction. No technical issues occurred during this visit.  Patient verbally confirmed consent for today's encounter  004/28/2025        Tammy Fowler is a 54 y.o. female who presents today for follow up    Chief Complaint:    \" Depression and anxiety\"    History of Present Illness:     History of Present Illness  Patient had visit this morning with gastrointestinal specialist.  Patient remains on the Pamelor through that office.  Reviewed vital signs from this visit.  Patient reports there is been a lot going on in the past few weeks she is having little bit of work stress.  Sleep onset sometimes can be difficult for the most part though she says she is sleeping well.  Adherent to taking medication as ordered.  Works 3 days at home and 2 days in the office.  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  Estimated date of graduation from this is December 2025.  His PHQ-9 was 0, previous EDITH-7 was 0.  PHQ-9 today is scored at 5, and EDITH-7 is scored at 0.  Patient attends psychotherapy on average about every 2 weeks and has an appointment coming up next week.              Patient Health Questionnaire-9 (PHQ-9) (Depression Screening Tool)  Little interest or pleasure in doing things? (Patient-Rptd) Several days   Feeling down, depressed, or hopeless? (Patient-Rptd) Several days   PHQ-2 Total Score (Patient-Rptd) 2   Trouble falling or staying asleep, or sleeping too much? (Patient-Rptd) Not at all   Feeling tired or having little energy? (Patient-Rptd) Several days   Poor appetite or overeating? (Patient-Rptd) Several days   Feeling bad about yourself - or that you are a failure or have let yourself or your family down? (Patient-Rptd) Not at all "   Trouble concentrating on things, such as reading the newspaper or watching television? (Patient-Rptd) Several days   Moving or speaking so slowly that other people could have noticed? Or the opposite - being so fidgety or restless that you have been moving around a lot more than usual? (Patient-Rptd) Not at all   Thoughts that you would be better off dead, or of hurting yourself in some way? (Patient-Rptd) Not at all   PHQ-9 Total Score (Patient-Rptd) 5   If you checked off any problems, how difficult have these problems made it for you to do your work, take care of things at home, or get along with other people? (Patient-Rptd) Somewhat difficult         PHQ-9 Total Score: (Patient-Rptd) 5       Generalized Anxiety Disorder 7-Item (EDITH-7) Screening Tool  Feeling nervous, anxious or on edge: (Patient-Rptd) Not at all  Not being able to stop or control worrying: (Patient-Rptd) Not at all  Worrying too much about different things: (Patient-Rptd) Not at all  Trouble Relaxing: (Patient-Rptd) Not at all  Being so restless that it is hard to sit still: (Patient-Rptd) Not at all  Feeling afraid as if something awful might happen: (Patient-Rptd) Not at all  Becoming easily annoyed or irritable: (Patient-Rptd) Not at all  EDITH 7 Total Score: (Patient-Rptd) 0         The following portions of the patient's history were reviewed and updated as appropriate: allergies, current medications, past family history, past medical history, past social history, past surgical history and problem list.    Past Psychiatric History:  Began Treatment: Around age 20  Diagnoses:Depression and Anxiety  Psychiatrist: Most recent medication provider was Jayjay Burgess with [facility]  Therapist: Currently in regular psychotherapy with FELIPE Lunsford since March 2024.  Patient was engaged in regular psychotherapy prior to that, but that therapist resigned.  Admission History: Patient reported 1 admission to [facility] when she was 20 years  old for suicidal ideation.  She reports she received therapy and medication services and did outpatient for a while at [facility].  Medication Trials: Pamelor when she was admitted to [facility].  Most recently has been on Wellbutrin and Celexa and reports this combination has been effective.  She also was recently prescribed Pamelor by her gastroenterologist.  Self Harm: Denies  Suicide Attempts:Denies   Psychosis, Anxiety, Depression:  Patient did endorse postpartum depression with all 3 pregnancies, she reports she feels like it was worst after the third child birth.  She did not receive any treatment.    Past Medical History:  Past Medical History:   Diagnosis Date    Allergic 1980    seasonal, foods, shellfish    Anxiety and depression     Arthritis     Cervical disc disorder     Chronic diarrhea     IBS-D    Chronic pain disorder     Degenerative disc disease    Claustrophobia     Closed fracture of coccyx 2013    Colon polyp 2018    Dr. Mares removed during colonoscopy    Dizziness     constant x 3 weeks    Headache     History of seasonal allergies     Hyperlipidemia     IBS (irritable bowel syndrome)     Injury of back 2013    broken tailbone    Injury of neck     whiplash/DDD found    Lactose intolerance early     dairy causes bloating and gas    Low back pain     Lumbosacral disc disease     Meniere's disease     Panic disorder     Peripheral neuropathy     Psychiatric illness        Substance Abuse History:   Types:Denies all, including illicit       Social History:  Social History     Socioeconomic History    Marital status:      Spouse name: Bob    Number of children: 3    Years of education: BA    Highest education level: Bachelor's degree (e.g., BA, AB, BS)   Tobacco Use    Smoking status: Former     Current packs/day: 0.00     Average packs/day: 2.2 packs/day for 33.3 years (74.9 ttl pk-yrs)     Types: Cigarettes     Start date:  1983     Quit date: 2008     Years since quittin.3    Smokeless tobacco: Never    Tobacco comments:     stopped 5 years for pregnancies   Vaping Use    Vaping status: Never Used   Substance and Sexual Activity    Alcohol use: Not Currently     Alcohol/week: 2.0 standard drinks of alcohol     Comment: Social    Drug use: No    Sexual activity: Yes     Partners: Male     Birth control/protection: Post-menopausal, Vasectomy   Patient reports support network consisting of her .  Patient is gainfully employed, has a bachelor's degree, no history of  service, no history of legal issues.    Family History:  Family History   Problem Relation Age of Onset    Cancer Mother         Non-Hodgkins Lymphoma    Hypertension Mother     Atrial fibrillation Mother     Arthritis Mother         DDD    Heart disease Mother         A-fib    Hyperlipidemia Mother     Thyroid disease Mother         Hypothyroid    Cancer Father         Hodgkins Lymphoma and Non-Hodgkins Lymphoma at two separate times of his life    Heart attack Father     Stroke Father     Hypertension Brother     Arthritis Brother         DDD    Hyperlipidemia Brother     Asthma Daughter     Arthritis Brother         DDD    Heart disease Maternal Aunt         Congestive Heart Failure    Dementia Paternal Uncle     Malig Hyperthermia Neg Hx        Past Surgical History:  Past Surgical History:   Procedure Laterality Date    ABDOMINAL SURGERY  10/10/1996        ANTERIOR CERVICAL DISCECTOMY W/ FUSION N/A 2018    Procedure: C5-6 ANTERIOR CERVICAL DISCECTOMY FUSION WITH INSTRUMENTATION;  Surgeon: Porter Kendrick MD;  Location: [facility];  Service: Neurosurgery    BACK SURGERY  2018    Cervical discectomy & fusion     SECTION      COLONOSCOPY N/A 2018    One 6 mm polyp in the ascending colon, NBIH.  PATH: Hyperplastic polyp     COLONOSCOPY  2018    COLONOSCOPY N/A 2023    Procedure: COLONOSCOPY to cecum ti  with cold snare polypectomy;  Surgeon: Cristian Mares MD;  Location: [facility] ENDOSCOPY;  Service: Gastroenterology;  Laterality: N/A;  pre hx colon polyps  post polyp hemorrhoids    EPIDURAL BLOCK  2007    Series of 4 block for cervical ddd    EYE SURGERY  2001    Lasik    LASIK      NECK SURGERY      SPINAL FUSION  2018    WISDOM TOOTH EXTRACTION         Problem List:  Patient Active Problem List   Diagnosis    Chronic diarrhea    Irritable bowel syndrome with diarrhea    Cervical radiculopathy    Lumbar radiculopathy    High risk medication use    History of adenomatous polyp of colon    Moderate episode of recurrent major depressive disorder    Generalized anxiety disorder with panic attacks       Allergy:   Allergies   Allergen Reactions    Cinnamon Other (See Comments) and Shortness Of Breath    Shellfish-Derived Products Hives        Current Medications:   Current Outpatient Medications   Medication Sig Dispense Refill    ALPRAZolam (Xanax) 0.25 MG tablet Take 1 tablet by mouth 2 (Two) Times a Day As Needed for Anxiety (panic). 60 tablet 0    buPROPion SR (WELLBUTRIN SR) 200 MG 12 hr tablet Take 1 tablet by mouth Every Morning. 90 tablet 3    cetirizine (zyrTEC) 10 MG tablet Take 1 tablet by mouth Daily.      citalopram (CeleXA) 20 MG tablet Take 1 tablet by mouth Daily. 90 tablet 3    FIBER PO Take  by mouth Every Morning.      nortriptyline (PAMELOR) 10 MG capsule Take 1 capsule by mouth Every Night. 90 capsule 3    Omega-3 Fatty Acids (fish oil) 1000 MG capsule capsule Take  by mouth Daily With Breakfast.      pantoprazole (PROTONIX) 40 MG EC tablet TAKE 1 TABLET BY MOUTH DAILY 30 tablet 1    triamterene-hydrochlorothiazide (MAXZIDE-25) 37.5-25 MG per tablet Take 1 tablet by mouth Daily. 90 tablet 0    ultra-purified peppermint oil (IBGARD) 90 mg capsule capsule Take 2 capsules by mouth 3 (Three) Times a Day As Needed (abdominal pain/diarrhea).       No current facility-administered medications for  this visit.            Physical Exam:   Physical Exam  Constitutional:       Appearance: Normal appearance. She is well-developed and well-groomed.   HENT:      Head: Normocephalic.   Neurological:      Mental Status: She is alert.   Psychiatric:         Attention and Perception: Attention and perception normal.         Mood and Affect: Mood and affect normal.         Speech: Speech normal.         Behavior: Behavior normal. Behavior is cooperative.         Thought Content: Thought content normal.         Cognition and Memory: Cognition and memory normal.         Vitals:  not currently breastfeeding. There is no height or weight on file to calculate BMI.  Due to extenuating circumstances and possible current health risks associated with the patient being present in a clinical setting (with current health restrictions in place in regards to possible COVID 19 transmission/exposure), the patient was seen remotely today via a MyChart Video Visit through [facility] and telephone encounter.  Unable to obtain vital signs due to nature of remote visit.  Height stated at 62 inches.  Weight stated at 142 pounds.    Last 3 Blood Pressure Readings:  BP Readings from Last 3 Encounters:   04/28/25 103/69   03/29/24 130/78   07/07/23 119/88          Mental Status Exam:   Hygiene:   good  Cooperation:  Cooperative  Eye Contact:  Good  Psychomotor Behavior:  Appropriate  Affect:  Full range  Mood: euthymic  Hopelessness: Denies  Speech:  Normal  Thought Process:  Goal directed and Linear  Thought Content:  Normal  Suicidal:  None  Homicidal:  None  Hallucinations:  None  Delusion:  None  Memory:  Intact  Orientation:  Person, Place, Time, and Situation  Reliability:  good  Insight:  Good  Judgement:  Good  Impulse Control:  Good  Physical/Medical Issues:  Yes see medical history        Lab Results:   No visits with results within 6 Month(s) from this visit.   Latest known visit with results is:   Office Visit on 03/29/2024   Component  Date Value Ref Range Status    WBC 03/29/2024 3.85  3.40 - 10.80 10*3/mm3 Final    RBC 03/29/2024 4.30  3.77 - 5.28 10*6/mm3 Final    Hemoglobin 03/29/2024 12.9  12.0 - 15.9 g/dL Final    Hematocrit 03/29/2024 40.2  34.0 - 46.6 % Final    MCV 03/29/2024 93.5  79.0 - 97.0 fL Final    MCH 03/29/2024 30.0  26.6 - 33.0 pg Final    MCHC 03/29/2024 32.1  31.5 - 35.7 g/dL Final    RDW 03/29/2024 12.5  12.3 - 15.4 % Final    Platelets 03/29/2024 255  140 - 450 10*3/mm3 Final    Neutrophil Rel % 03/29/2024 62.0  42.7 - 76.0 % Final    Lymphocyte Rel % 03/29/2024 26.0  19.6 - 45.3 % Final    Monocyte Rel % 03/29/2024 9.1  5.0 - 12.0 % Final    Eosinophil Rel % 03/29/2024 1.8  0.3 - 6.2 % Final    Basophil Rel % 03/29/2024 0.8  0.0 - 1.5 % Final    Neutrophils Absolute 03/29/2024 2.39  1.70 - 7.00 10*3/mm3 Final    Lymphocytes Absolute 03/29/2024 1.00  0.70 - 3.10 10*3/mm3 Final    Monocytes Absolute 03/29/2024 0.35  0.10 - 0.90 10*3/mm3 Final    Eosinophils Absolute 03/29/2024 0.07  0.00 - 0.40 10*3/mm3 Final    Basophils Absolute 03/29/2024 0.03  0.00 - 0.20 10*3/mm3 Final    Immature Granulocyte Rel % 03/29/2024 0.3  0.0 - 0.5 % Final    Immature Grans Absolute 03/29/2024 0.01  0.00 - 0.05 10*3/mm3 Final    nRBC 03/29/2024 0.0  0.0 - 0.2 /100 WBC Final    Glucose 03/29/2024 80  65 - 99 mg/dL Final    BUN 03/29/2024 11  6 - 20 mg/dL Final    Creatinine 03/29/2024 0.80  0.57 - 1.00 mg/dL Final    EGFR Result 03/29/2024 88.8  >60.0 mL/min/1.73 Final    Comment: GFR Normal >60  Chronic Kidney Disease <60  Kidney Failure <15      BUN/Creatinine Ratio 03/29/2024 13.8  7.0 - 25.0 Final    Sodium 03/29/2024 139  136 - 145 mmol/L Final    Potassium 03/29/2024 3.8  3.5 - 5.2 mmol/L Final    Chloride 03/29/2024 100  98 - 107 mmol/L Final    Total CO2 03/29/2024 31.2 (H)  22.0 - 29.0 mmol/L Final    Calcium 03/29/2024 9.4  8.6 - 10.5 mg/dL Final    Total Protein 03/29/2024 6.8  6.0 - 8.5 g/dL Final    Albumin 03/29/2024 4.4  3.5 - 5.2  g/dL Final    Globulin 03/29/2024 2.4  gm/dL Final    A/G Ratio 03/29/2024 1.8  g/dL Final    Total Bilirubin 03/29/2024 0.4  0.0 - 1.2 mg/dL Final    Alkaline Phosphatase 03/29/2024 96  39 - 117 U/L Final    AST (SGOT) 03/29/2024 28  1 - 32 U/L Final    ALT (SGPT) 03/29/2024 16  1 - 33 U/L Final    Total Cholesterol 03/29/2024 230 (H)  0 - 200 mg/dL Final    Comment: Cholesterol Reference Ranges  (U.S. Department of Health and Human Services ATP III  Classifications)  Desirable          <200 mg/dL  Borderline High    200-239 mg/dL  High Risk          >240 mg/dL  Triglyceride Reference Ranges  (U.S. Department of Health and Human Services ATP III  Classifications)  Normal           <150 mg/dL  Borderline High  150-199 mg/dL  High             200-499 mg/dL  Very High        >500 mg/dL  HDL Reference Ranges  (U.S. Department of Health and Human Services ATP III  Classifications)  Low     <40 mg/dl (major risk factor for CHD)  High    >60 mg/dl ('negative' risk factor for CHD)  LDL Reference Ranges  (U.S. Department of Health and Human Services ATP III  Classifications)  Optimal          <100 mg/dL  Near Optimal     100-129 mg/dL  Borderline High  130-159 mg/dL  High             160-189 mg/dL  Very High        >189 mg/dL      Triglycerides 03/29/2024 87  0 - 150 mg/dL Final    HDL Cholesterol 03/29/2024 77 (H)  40 - 60 mg/dL Final    VLDL Cholesterol Rob 03/29/2024 15  5 - 40 mg/dL Final    LDL Chol Calc (NIH) 03/29/2024 138 (H)  0 - 100 mg/dL Final    TSH 03/29/2024 0.919  0.270 - 4.200 uIU/mL Final    Vitamin B-12 03/29/2024 584  211 - 946 pg/mL Final    Results may be falsely increased if patient taking Biotin.            Assessment & Plan   Diagnoses and all orders for this visit:    1. Major depressive disorder, recurrent episode, moderate (Primary)  -     buPROPion SR (WELLBUTRIN SR) 200 MG 12 hr tablet; Take 1 tablet by mouth Every Morning.  Dispense: 90 tablet; Refill: 3  -     citalopram (CeleXA) 20 MG tablet;  Take 1 tablet by mouth Daily.  Dispense: 90 tablet; Refill: 3    2. Generalized anxiety disorder with panic attacks  -     citalopram (CeleXA) 20 MG tablet; Take 1 tablet by mouth Daily.  Dispense: 90 tablet; Refill: 3            Visit Diagnoses:    ICD-10-CM ICD-9-CM   1. Major depressive disorder, recurrent episode, moderate  F33.1 296.32   2. Generalized anxiety disorder with panic attacks  F41.1 300.02    F41.0 300.01             GOALS:  Short Term Goals: Patient will be compliant with medication, and patient will have no significant medication related side effects.  Patient will be engaged in psychotherapy as indicated.  Patient will report subjective improvement of symptoms.  Long term goals: To stabilize mood and treat/improve subjective symptoms, the patient will stay out of the hospital, the patient will be at an optimal level of functioning, and the patient will take all medications as prescribed.  The patient/guardian verbalized understanding and agreement with goals that were mutually set.    SUICIDE RISK ASSESSMENT AND SAFETY PLAN: Unalterable demographics and a history of mental health intervention indicate this patient is in a high risk category compared to the general population. At present, the patient denies active SI/HI, intentions, or plans at this time and agrees to seek immediate care should such thoughts develop. The patient verbalizes understanding of how to access emergency care if needed and agrees to do so. Consideration of suicide risk and protective factors such as history, current presentation, individual strengths and weaknesses, psychosocial and environmental stressors and variables, psychiatric illness and symptoms, medical conditions and pain, took place in this interview. Based on those considerations, the patient is determined: within individual baseline and presenting no imminent risk for suicide or homicide. Other recommendations: The patient does not meet the criteria for  inpatient admission and is not a safety risk to self or others at today's visit. Inpatient treatment offers no significant advantages over outpatient treatment for this patient at today's visit.  The patient was given ample time for questions and fully participated during the encounter/in treatment planning.  The patient was encouraged to call the clinic with any questions or concerns.  The patient was informed of access to emergency care. If patient were to develop any significant symptomatology, suicidal ideation, homicidal ideation, any concerns, or feel unsafe at any time they are to call the clinic and if unable to get immediate assistance should immediately call 911 or go to the nearest emergency room.  Patient contracted verbally for the following: If you are experiencing an emotional crisis or have thoughts of harming yourself or others, please go to your nearest local emergency room or call 911. Will continue to re-assess medication response and side effects frequently to establish efficacy and ensure safety. Risks, any black box warnings, side effects, off label usage, and benefits of medication and treatment discussed with patient, along with potential adverse side effects of current and/or newly prescribed medication, alternative treatment options, and OTC medications.  Patient verbalized understanding of potential risks, any off label use of medication, any black box warnings, and any side effects in their own words. The patient verbalized understanding and agreed to comply with the safety plan discussed in their own words.      TREATMENT PLAN: Continue supportive psychotherapy efforts and medications as indicated.   Pharmacological and Non-Pharmacological treatment options discussed during today's visit. Patient/Guardian acknowledged and verbally consented with current treatment plan and was educated on the importance of compliance with treatment and follow-up appointments.      MEDICATION  ISSUES:  Discussed medication options and treatment plan of prescribed medication as well as the risks, benefits, any black box warnings, and side effects including potential falls, possible impaired driving, and metabolic adversities among others. Patient is agreeable to call the office with any worsening of symptoms or onset of side effects, or if any concerns or questions arise.  The contact information for the office is made available to the patient. Patient is agreeable to call 911 or go to the nearest ER should they begin having any SI/HI, or if any urgent concerns arise. No medication side effects or related complaints today.     Continue Wellbutrin 200 mg SR tablet every morning  Continue with Celexa 20 mg daily    MEDS ORDERED DURING VISIT:  New Medications Ordered This Visit   Medications    buPROPion SR (WELLBUTRIN SR) 200 MG 12 hr tablet     Sig: Take 1 tablet by mouth Every Morning.     Dispense:  90 tablet     Refill:  3    citalopram (CeleXA) 20 MG tablet     Sig: Take 1 tablet by mouth Daily.     Dispense:  90 tablet     Refill:  3       Follow Up Appointment:   Return in about 15 weeks (around 8/11/2025) for Recheck, Video visit.               This document has been electronically signed by JAYDA Brown  April 28, 2025 13:37 EDT    Dictated Utilizing Dragon Dictation: Part of this note may be an electronic transcription/translation of spoken language to printed text using the Dragon Dictation System.

## 2025-05-21 ENCOUNTER — TELEMEDICINE (OUTPATIENT)
Dept: PSYCHIATRY | Facility: CLINIC | Age: 55
End: 2025-05-21
Payer: COMMERCIAL

## 2025-05-21 DIAGNOSIS — F41.0 GENERALIZED ANXIETY DISORDER WITH PANIC ATTACKS: Primary | ICD-10-CM

## 2025-05-21 DIAGNOSIS — F41.1 GENERALIZED ANXIETY DISORDER WITH PANIC ATTACKS: Primary | ICD-10-CM
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
## 2025-05-22 NOTE — TREATMENT PLAN
Multi-Disciplinary Problems (from Behavioral Health Treatment Plan)      Active Problems       Problem: Anxiety  Start Date: 05/21/25      Problem Details: The patient self-scales this problem as a 4 with 10 being the worst.          Goal Priority Start Date Expected End Date End Date    Patient will develop and implement behavioral and cognitive strategies to reduce anxiety and irrational fears. High 05/21/25 11/19/25 --    Goal Details: Progress toward goal:  Not appropriate to rate progress toward goal since this is the initial treatment plan.        Goal Intervention Frequency Start Date End Date    Help patient explore past emotional issues in relation to present anxiety. Q2 Weeks 05/21/25 --    Intervention Details: Duration of treatment until remission of symptoms.      Help patient explore past emotional issues in relation to present anxiety.     Pt will increase understanding of anxious feelings by processing origin of anxious feelings  Pt will employ positive and affirming self talk to reduce or eliminate anxiety  Pt will take all medications as prescribed.  Pt will attend counseling regularly  Pt will decrease EDITH-7 score by half        Goal Intervention Frequency Start Date End Date    Help patient develop an awareness of their cognitive and physical responses to anxiety. Q2 Weeks 05/21/25 --    Intervention Details: Duration of treatment until remission of symptoms.      Help patient develop an awareness of their cognitive and physical responses to anxiety.  Pt will decrease frequency of panic feelings by half  Pt will practice use of learned coping skills daily when not under distress so skills are more easily employed when needed.  Pt will use learned relaxation techniques (controlled breathing, progressive muscle relaxation, mindfulness)  DBT and CBT will be utilized to assist Pt in recognizing heightened emotional responses to stress, and using a multi-faceted approach, Pt will identify ways to  mitigate associated symptoms.                Problem: Depression  Start Date: 05/21/25      Problem Details: The patient self-scales this problem as a 4 with 10 being the worst.          Goal Priority Start Date Expected End Date End Date    Patient will demonstrate the ability to initiate new constructive life skills outside of sessions on a consistent basis. Medium 05/21/25 11/19/25 --    Goal Details: Progress toward goal:  Not appropriate to rate progress toward goal since this is the initial treatment plan.        Goal Intervention Frequency Start Date End Date    Assist patient in setting attainable activities of daily living goals. Q2 Weeks 05/21/25 --    Intervention Details: Assist patient in setting attainable activities of daily living goals.    Increase understanding of depressive feelings  Pt will decrease PHQ-9 score by half  Address underlying issues that may be contributing to Pt's depression  Set boundaries as needed  Take all medications as prescribed  Actively participate in counseling  Comply with all contracts for safety         Goal Intervention Frequency Start Date End Date    Provide education about depression Q2 Weeks 05/21/25 --    Intervention Details: Duration of treatment until remission of symptoms.      Provide education about depression     Counselor will provide detailed explanation of how depression can affect one's emotional and physical health.  Pt will ask questions as need to gain an more in-depth understanding of their depression  Pt will inform Counselor if there is a need for a higher level of care up to and including hospitalization.          Goal Intervention Frequency Start Date End Date    Assist patient in developing healthy coping strategies. Q2 Weeks 05/21/25 --    Intervention Details: Duration of treatment until remission of symptoms.  Counselor will work with Pt to develop a set of coping skills that Pt may access outside of Counseling  Pt will practice coping  "skills daily when not under distress so these skills are more readily recalled when needed.  Pt will replace negative self defeating self talk with more positive life affirming statements to improve mood                Problem: Vocational Stress  Start Date: 05/21/25      Problem Details: The patient self-scales this problem as a 6 with 10 being the worst.          Goal Priority Start Date Expected End Date End Date    Patient will develop a constructive action plan that will reduce specific areas of work stress. High 05/21/25 11/19/25 --    Goal Details: Progress toward goal:  Not appropriate to rate progress toward goal since this is the initial treatment plan.        Goal Intervention Frequency Start Date End Date    Assist patient in identifying emotions and cognitive messages surrounding the work stress. Q2 Weeks 05/21/25 --    Intervention Details: Duration of treatment until remission of symptoms.        Goal Intervention Frequency Start Date End Date    Reinforce realistic self appraisal of patient's successes and challenges at work. Q2 Weeks 05/21/25 --    Intervention Details: Duration of treatment until remission of symptoms.    Pt will establish health work/life boundaries such as having a transition time before and after work (I.e., removing work attire/shoes, playing music, having a \"settling in\" time when returning home)    Pt will accept that there are things they cannot change in their work environment    Pt will weigh the benefits and emotional and physical costs of their job and make a purposeful and informed decision as to how they want to proceed in their career choices    Pt will practice effectively communicating wants and needs    Pt will set appropriate boundaries within the work environment as well as home.                               I have discussed and reviewed this treatment plan with the patient.   "

## 2025-06-04 ENCOUNTER — TELEMEDICINE (OUTPATIENT)
Dept: PSYCHIATRY | Facility: CLINIC | Age: 55
End: 2025-06-04
Payer: COMMERCIAL

## 2025-06-04 DIAGNOSIS — F33.1 MAJOR DEPRESSIVE DISORDER, RECURRENT EPISODE, MODERATE: ICD-10-CM

## 2025-06-04 DIAGNOSIS — F41.0 GENERALIZED ANXIETY DISORDER WITH PANIC ATTACKS: Primary | ICD-10-CM

## 2025-06-04 DIAGNOSIS — F41.1 GENERALIZED ANXIETY DISORDER WITH PANIC ATTACKS: Primary | ICD-10-CM

## 2025-06-04 NOTE — PROGRESS NOTES
"Date: June 8, 2025  Time In: 2:00  Time out: 3:02      This provider is located at the [facility]), [address], [place] [address] using a secure [employer] Video Visit through [employer]. Patient is being seen remotely via telehealth, and they are in a secure environment for this session. The patient's condition being diagnosed/treated is appropriate for telemedicine. The provider identified herself as well as her credentials. The patient, and/or patients guardian, consent to be seen remotely, and when consent is given they understand that the consent allows for patient identifiable information to be sent to a third party as needed. They may refuse to be seen remotely at any time. The electronic data is encrypted and password protected, and the patient and/or guardian has been advised of the potential risks to privacy not withstanding such measures.     Mode of Visit: Video  Location of patient: home    Location of provider: Provider home  You have chosen to receive care through a telehealth visit.  The patient has signed the video visit consent form.  The visit included audio and video interaction. No technical issues occurred during this visit    Subjective   Nicki Fowler is a 54 y.o. female who presents today fully oriented, appropriately dressed and groomed, and open to communication for follow up appointment.    Chief Complaint:   Chief Complaint   Patient presents with    Anxiety    Depression    Sleeping Problem        History of Present Illness: Rapport was established through conversation and unconditional positive regard. Recent events were discussed and how these events impact Pt's emotional health.   Pt reports successful use of learned coping skills since last report.  Pt reports continuation of symptoms and states the intensity and duration of symptoms has worsened \"a little\" since last report. Pt rates anxiety at 4/10 and depression at " "3/10.     Sleep: Pt reports sleep has remained unchanged since last report. Healthy sleep habits were discussed such as maintaining a schedule, routine, avoiding caffeine, and limiting screen time before bed.  Pt reports some delay in onset. \"I feel tired I want to go to sleep\"  Pt states she usually wakes once during the night and goes back to sleep, and then she is tired the next day.    Appetite:  Pt reports appetite has been good since last report.  Discussed the importance of hydration and eating a healthy diet for overall and mental health.    Medication compliance: Pt reports medications are being taken daily as prescribed. Discussed the importance of compliance with prescriber's directions and Pt was instructed to report questions/concerns, as well as missed doses or discontinuation of medication by Pt.     Safety Plan in Place: No Pt denies SI/HI/SIB recent or current    Daily Functioning:  Since last report, Pt states symptoms are causing Moderate difficulty in daily functioning.      Content Discussion:   Pt reports life updates since previous session. Pt identified current stressors. Pt acknowledged stressors within and outside of one's control. Pt identified successes and issues with utilizing coping mechanisms.  Pt states \"I'm very very tired.\"   Pt states her brother is \"driving me crazy.\"  Pt processed some feelings about brother's quirks.  Reports not a lot happening other than that.  Pt states her first quarter of her school is wrapping up and she has finals coming up, but she is prepared for them.  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  Reports her daughter and  came in for the Memorial Day weekend and Pt and daughter had a nice time together.  Pt is trying to refrain from allowing her current job stress to interfere with her personal life, as she knows this job is temporary until she can get through the certification and find another employer.        Counselor " supported Pt in continued exploration of underlying belief systems which contribute to difficulty in connecting/vulnerability.  Through discussion, Pt identifies themes of preservation and overt emotional and physical reactivity when physical and/or emotional statis is in question, even in low or perceived threatening situations. Counselor supported Pt in identifying past experiences and underlying beliefs which contribute to these feelings and reactions.  Pt was supported and encouraged in processing emotions related to frustrations with the expectations of self and others.  Pt was encouraged to identify cultural and nuclear familial contexts which contribute to these concerns.  Pt was receptive and engaged in conversation, and Pt reports this was beneficial and applicable to their situation.      Reviewed coping skills and encouraged Pt to continue to practicing coping skills daily when not under distress. Pt was praised for their attempts to decrease symptoms and mitigate activating events.    Clinical Maneuvering/Intervention:  CBT was utilized to encourage Pt to identify maladaptive thoughts and behaviors and replace with more affirming and positive.Pt encouraged to set and maintain appropriate and healthy boundaries with others. Pt was instructed to practice daily using appropriate and specific words to communicate feelings to others.  Motivational interviewing used to encourage Pt to identify strengths which can be utilized in working toward treatment goals. Pt encouraged to practice daily learned skills such as controlled breathing, grounding, and mindfulness. Pt was encouraged to ask for help from support persons to assist them in maintaining stability and alleviate symptoms. Discussed the importance of being one's own mental health advocate and to refrain from seeing the need to ask for help as a weakness. Pt was encouraged to formulate a plan of action to be more proactive in managing stressors and  refrain from using reactive and automatic heightened emotional responses.     Solution-focused therapy employed to identify how Pt would like their life to be if they were to make positive changes. Pt encouraged to identify effective coping skills and strengths they can use to continue utilizing those skills. Pt encouraged to discontinue utilizing non-effective coping mechanisms. Pt provided with feedback to highlight achievements and personal and other resources. Encouraged use of SMART goals    Assisted patient in processing above session content; acknowledged and normalized patient’s thoughts, feelings, and concerns.  Rationalized patient thought process regarding concerns presented at session.  Discussed triggers associated with patient's  anxiety  and depression  Also discussed coping skills for patient to implement such as grounding , self care , and positive self talk     Allowed patient to freely discuss issues without interruption or judgment. Provided safe, confidential environment to facilitate the development of positive therapeutic relationship and encourage open, honest communication. Assisted patient in identifying risk factors which would indicate the need for higher level of care including thoughts to harm self or others and/or self-harming behavior and encouraged patient to contact this office, call 911, or present to the nearest emergency room should any of these events occur. Discussed crisis intervention services and means to access. Patient adamantly and convincingly denies current suicidal or homicidal ideation or perceptual disturbance.    Assessment:     Patient appears to maintain relative stability as compared to their baseline.  However, patient persistently struggles with symptoms which continue to cause impairment in important areas of functioning.  As a result, they can be reasonably expected to continue to benefit from treatment and would likely be at increased risk for decompensation  "otherwise.      PHQ-Score Total:  PHQ-9 Total Score: PHQ-9 Depression Screening  Little interest or pleasure in doing things?     Feeling down, depressed, or hopeless?     PHQ-2 Total Score     Trouble falling or staying asleep, or sleeping too much?     Feeling tired or having little energy?     Poor appetite or overeating?     Feeling bad about yourself - or that you are a failure or have let yourself or your family down?     Trouble concentrating on things, such as reading the newspaper or watching television?     Moving or speaking so slowly that other people could have noticed? Or the opposite - being so fidgety or restless that you have been moving around a lot more than usual?     Thoughts that you would be better off dead, or of hurting yourself in some way?     PHQ-9 Total Score     If you checked off any problems, how difficult have these problems made it for you to do your work, take care of things at home, or get along with other people?               Mental Status Exam:   Hygiene:   good  Cooperation:  Cooperative  Eye Contact:  Good  Psychomotor Behavior:  Appropriate  Affect:  Full range  Mood: anxious  \"work has me down\"  Speech:  Normal  Thought Process:  Goal directed  Thought Content:  Normal  Suicidal:  None  Homicidal: None  Hallucinations:  None  Delusion: None  Memory:  Intact  Orientation:  Grossly Intact  Reliability:  good  Insight:  Good  Judgement:  Good  Impulse Control:  Good  Physical/Medical Issues:  No        Functional Status: Moderate impairment     Progress toward goal: Not at goal    Prognosis: Good with continued therapeutic intervention        Plan:    Patient will continue in individual outpatient therapy with focus on improved functioning and coping skills, maintaining stability, and avoiding decompensation and the need for higher level of care.    Patient will adhere to medication regimen as prescribed and report any side effects. Patient will contact this office, call 911 " or present to the nearest emergency room should suicidal or homicidal ideations occur. Provide Cognitive Behavioral Therapy and Solution Focused Therapy to improve functioning, maintain stability, and avoid decompensation and the need for higher level of care.     Return in about 2 weeks (around 6/18/2025).      VISIT DIAGNOSIS:    Diagnosis Plan   1. Generalized anxiety disorder with panic attacks        2. Major depressive disorder, recurrent episode, moderate         14:00 EDT       This document has been electronically signed by EDWIN Hastings  June 8, 2025      Part of this note may be an electronic transcription/translation of spoken language to printed text using the Dragon Dictation System.

## 2025-06-09 ENCOUNTER — OFFICE VISIT (OUTPATIENT)
Dept: FAMILY MEDICINE CLINIC | Facility: CLINIC | Age: 55
End: 2025-06-09
Payer: COMMERCIAL

## 2025-06-09 VITALS
HEART RATE: 88 BPM | HEIGHT: 62 IN | SYSTOLIC BLOOD PRESSURE: 125 MMHG | BODY MASS INDEX: 26.65 KG/M2 | DIASTOLIC BLOOD PRESSURE: 80 MMHG | WEIGHT: 144.8 LBS

## 2025-06-09 DIAGNOSIS — Z87.891 HISTORY OF NICOTINE DEPENDENCE: ICD-10-CM

## 2025-06-09 DIAGNOSIS — Z12.31 SCREENING MAMMOGRAM, ENCOUNTER FOR: ICD-10-CM

## 2025-06-09 DIAGNOSIS — Z01.419 WELL FEMALE EXAM WITH ROUTINE GYNECOLOGICAL EXAM: ICD-10-CM

## 2025-06-09 DIAGNOSIS — R12 HEARTBURN: ICD-10-CM

## 2025-06-09 DIAGNOSIS — R53.83 FATIGUE, UNSPECIFIED TYPE: ICD-10-CM

## 2025-06-09 DIAGNOSIS — E78.00 HYPERCHOLESTEROLEMIA: ICD-10-CM

## 2025-06-09 DIAGNOSIS — F41.1 GAD (GENERALIZED ANXIETY DISORDER): ICD-10-CM

## 2025-06-09 DIAGNOSIS — Z00.00 WELL ADULT EXAM: Primary | ICD-10-CM

## 2025-06-09 PROCEDURE — 99396 PREV VISIT EST AGE 40-64: CPT | Performed by: FAMILY MEDICINE

## 2025-06-09 PROCEDURE — 90471 IMMUNIZATION ADMIN: CPT | Performed by: FAMILY MEDICINE

## 2025-06-09 PROCEDURE — 90715 TDAP VACCINE 7 YRS/> IM: CPT | Performed by: FAMILY MEDICINE

## 2025-06-09 NOTE — PATIENT INSTRUCTIONS
Continue your current medications.   Aim for 150 minutes of aerobic exercise weekly.  Continue yearly mammogram. Due now order placed.  Encourage yearly pap smear given prior history of abnormal If abnormal persists, plan gynecology evaluation.   Colonoscopy is up to date. Next due 7/2028.  Given no prior screening, low dose chest CT ordered to screen for lung cancer. If normal, no additional required as it has been 15 years since you quit.

## 2025-06-09 NOTE — PROGRESS NOTES
Chief Complaint  Annual Exam    Subjective    History of Present Illness {CC  Problem List  Visit  Diagnosis   Encounters  Notes  Medications  Labs  Result Review Imaging  Media :23}     Nicki Fowler presents to [facility] for Annual Exam.  She is  and has 3 adult children--25, 27, and 29. She is due pap smear and mammogram. She has a history of abnormal pap smear and has been seeing [facility] but no longer wishes to go there due to frequent rescheduling. Her last colonoscopy was 2023. She has history of adenomatous polyps and was told to recheck in 5 years. She is a former smoker of more than 20 pack years and stopped about 17 years ago. She has never had lung cancer screening. She is up to date on routine dental and eye exams.     Their chronic medical conditions were reviewed and are stable unless noted otherwise below.     She is also here for follow-up of reflux.  She is currently on pantoprazole 40 mg daily and her symptoms are well-controlled.  She has tried stopping in the past and her symptoms recurred.      She also has a history of Ménière's disease.  She is followed by her ENT and is stable on triamterene HCTZ.      She also sees a mental health provider for anxiety disorder and is stable counseling and current medications of bupropion, Celexa, and nortriptyline.  She also has Xanax that she uses as needed but this is rare.    History of Present Illness     Social History     Socioeconomic History    Marital status:      Spouse name: Bob    Number of children: 3    Years of education: BA    Highest education level: Bachelor's degree (e.g., BA, AB, BS)   Tobacco Use    Smoking status: Former     Current packs/day: 0.00     Average packs/day: 2.2 packs/day for 33.3 years (74.9 ttl pk-yrs)     Types: Cigarettes     Start date: 1983     Quit date: 2008     Years since quittin.4    Smokeless tobacco: Never    Tobacco comments:      "stopped 5 years for pregnancies   Vaping Use    Vaping status: Never Used   Substance and Sexual Activity    Alcohol use: Not Currently     Alcohol/week: 2.0 standard drinks of alcohol     Comment: Social    Drug use: No    Sexual activity: Yes     Partners: Male     Birth control/protection: Post-menopausal, Vasectomy        Review of Systems   Constitutional:  Positive for fatigue (mild, no acute change).   HENT:  Negative for ear pain and sore throat.    Eyes:  Negative for pain and visual disturbance.   Respiratory:  Negative for cough and shortness of breath.    Cardiovascular:  Negative for chest pain and palpitations.   Gastrointestinal:  Negative for abdominal pain, constipation and diarrhea.   Genitourinary:  Negative for dysuria.   Musculoskeletal:  Negative for arthralgias.   Skin:  Negative for color change.   Allergic/Immunologic: Positive for environmental allergies (on immunotherapy and zyrtec).   Neurological:  Negative for dizziness and headaches.   Psychiatric/Behavioral:  The patient is nervous/anxious (stable on medication).         Objective       Vital Signs:   /80   Pulse 88   Ht 157.5 cm (62\")   Wt 65.7 kg (144 lb 12.8 oz)   BMI 26.48 kg/m²     Body mass index is 26.48 kg/m².     PHQ-9 Depression Screening  Little interest or pleasure in doing things?     Feeling down, depressed, or hopeless?     PHQ-2 Total Score     Trouble falling or staying asleep, or sleeping too much?     Feeling tired or having little energy?     Poor appetite or overeating?     Feeling bad about yourself - or that you are a failure or have let yourself or your family down?     Trouble concentrating on things, such as reading the newspaper or watching television?     Moving or speaking so slowly that other people could have noticed? Or the opposite - being so fidgety or restless that you have been moving around a lot more than usual?     Thoughts that you would be better off dead, or of hurting yourself in " some way?     PHQ-9 Total Score     If you checked off any problems, how difficult have these problems made it for you to do your work, take care of things at home, or get along with other people?        Physical Exam  Constitutional:       General: She is not in acute distress.  HENT:      Head: Normocephalic and atraumatic.      Nose: No rhinorrhea.      Mouth/Throat:      Mouth: Mucous membranes are moist.   Cardiovascular:      Rate and Rhythm: Normal rate and regular rhythm.      Heart sounds: No murmur heard.  Pulmonary:      Effort: No respiratory distress.      Breath sounds: Normal breath sounds.   Chest:   Breasts:     Right: No mass, skin change or tenderness.      Left: No mass, skin change or tenderness.   Abdominal:      General: There is no distension.      Palpations: Abdomen is soft.      Tenderness: There is no abdominal tenderness.   Genitourinary:     Vagina: No lesions.      Cervix: No cervical motion tenderness or erythema.      Uterus: Not tender.       Adnexa:         Left: No tenderness or fullness.     Musculoskeletal:      Right lower leg: No edema.      Left lower leg: No edema.   Lymphadenopathy:      Cervical: No cervical adenopathy.   Skin:     General: Skin is warm.      Findings: No rash.   Neurological:      General: No focal deficit present.      Mental Status: She is alert.   Psychiatric:         Behavior: Behavior normal.          Result Review  Data Reviewed:{ Labs  Result Review  Imaging  Med Tab  Media :23}                Assessment and Plan {CC Problem List  Visit Diagnosis  ROS  Review (Popup)  Health Maintenance  Quality  BestPractice  Medications  SmartSets  SnapShot Encounters  Media :23}   Diagnoses and all orders for this visit:    1. Well adult exam (Primary)  -     CBC & Differential  -     Comprehensive Metabolic Panel  -     Lipid Panel  -     TSH Rfx On Abnormal To Free T4    2. History of nicotine dependence  -      CT Chest Low Dose Cancer  Screening WO; Future    3. Well female exam with routine gynecological exam  -     IGP, Aptima HPV, Rfx 16 / 18,45    4. EDITH (generalized anxiety disorder)  -     TSH Rfx On Abnormal To Free T4    5. Hypercholesterolemia  -     Lipid Panel    6. Heartburn    7. Fatigue, unspecified type  -     CBC & Differential  -     Comprehensive Metabolic Panel  -     TSH Rfx On Abnormal To Free T4    8. Screening mammogram, encounter for  -     Mammo Screening Digital Tomosynthesis Bilateral With CAD; Future    Other orders  -     Tdap Vaccine Greater Than or Equal To 8yo IM        Patient Instructions   Continue your current medications.   Aim for 150 minutes of aerobic exercise weekly.  Continue yearly mammogram. Due now order placed.  Encourage yearly pap smear given prior history of abnormal If abnormal persists, plan gynecology evaluation.   Colonoscopy is up to date. Next due 7/2028.  Given no prior screening, low dose chest CT ordered to screen for lung cancer. If normal, no additional required as it has been 15 years since you quit.      Routine health maintenance, immunizations, and cancer screenings were discussed and encouraged.    Patient was given instructions and counseling regarding her condition or for health maintenance advice on the AVS.       Return in about 1 year (around 6/9/2026) for Annual physical.    Elizabeth West MD

## 2025-06-10 LAB
ALBUMIN SERPL-MCNC: 4.5 G/DL (ref 3.8–4.9)
ALP SERPL-CCNC: 139 IU/L (ref 44–121)
ALT SERPL-CCNC: 14 IU/L (ref 0–32)
AST SERPL-CCNC: 27 IU/L (ref 0–40)
BASOPHILS # BLD AUTO: 0 X10E3/UL (ref 0–0.2)
BASOPHILS NFR BLD AUTO: 0 %
BILIRUB SERPL-MCNC: 0.3 MG/DL (ref 0–1.2)
BUN SERPL-MCNC: 11 MG/DL (ref 6–24)
BUN/CREAT SERPL: 13 (ref 9–23)
CALCIUM SERPL-MCNC: 9.6 MG/DL (ref 8.7–10.2)
CHLORIDE SERPL-SCNC: 97 MMOL/L (ref 96–106)
CHOLEST SERPL-MCNC: 208 MG/DL (ref 100–199)
CO2 SERPL-SCNC: 27 MMOL/L (ref 20–29)
CREAT SERPL-MCNC: 0.84 MG/DL (ref 0.57–1)
EGFRCR SERPLBLD CKD-EPI 2021: 83 ML/MIN/1.73
EOSINOPHIL # BLD AUTO: 0.1 X10E3/UL (ref 0–0.4)
EOSINOPHIL NFR BLD AUTO: 1 %
ERYTHROCYTE [DISTWIDTH] IN BLOOD BY AUTOMATED COUNT: 12.6 % (ref 11.7–15.4)
GLOBULIN SER CALC-MCNC: 2.3 G/DL (ref 1.5–4.5)
GLUCOSE SERPL-MCNC: 79 MG/DL (ref 70–99)
HCT VFR BLD AUTO: 41.8 % (ref 34–46.6)
HDLC SERPL-MCNC: 62 MG/DL
HGB BLD-MCNC: 13.3 G/DL (ref 11.1–15.9)
IMM GRANULOCYTES # BLD AUTO: 0 X10E3/UL (ref 0–0.1)
IMM GRANULOCYTES NFR BLD AUTO: 0 %
LDLC SERPL CALC-MCNC: 133 MG/DL (ref 0–99)
LYMPHOCYTES # BLD AUTO: 1.5 X10E3/UL (ref 0.7–3.1)
LYMPHOCYTES NFR BLD AUTO: 27 %
MCH RBC QN AUTO: 28.9 PG (ref 26.6–33)
MCHC RBC AUTO-ENTMCNC: 31.8 G/DL (ref 31.5–35.7)
MCV RBC AUTO: 91 FL (ref 79–97)
MONOCYTES # BLD AUTO: 0.5 X10E3/UL (ref 0.1–0.9)
MONOCYTES NFR BLD AUTO: 9 %
NEUTROPHILS # BLD AUTO: 3.6 X10E3/UL (ref 1.4–7)
NEUTROPHILS NFR BLD AUTO: 63 %
PLATELET # BLD AUTO: 325 X10E3/UL (ref 150–450)
POTASSIUM SERPL-SCNC: 4.1 MMOL/L (ref 3.5–5.2)
PROT SERPL-MCNC: 6.8 G/DL (ref 6–8.5)
RBC # BLD AUTO: 4.6 X10E6/UL (ref 3.77–5.28)
SODIUM SERPL-SCNC: 140 MMOL/L (ref 134–144)
TRIGL SERPL-MCNC: 70 MG/DL (ref 0–149)
TSH SERPL DL<=0.005 MIU/L-ACNC: 1.01 UIU/ML (ref 0.45–4.5)
VLDLC SERPL CALC-MCNC: 13 MG/DL (ref 5–40)
WBC # BLD AUTO: 5.8 X10E3/UL (ref 3.4–10.8)

## 2025-06-11 LAB
CYTOLOGIST CVX/VAG CYTO: NORMAL
CYTOLOGY CVX/VAG DOC CYTO: NORMAL
CYTOLOGY CVX/VAG DOC THIN PREP: NORMAL
DX ICD CODE: NORMAL
HPV GENOTYPE REFLEX: NORMAL
HPV I/H RISK 4 DNA CVX QL PROBE+SIG AMP: NEGATIVE
OTHER STN SPEC: NORMAL
SERVICE CMNT-IMP: NORMAL
STAT OF ADQ CVX/VAG CYTO-IMP: NORMAL

## 2025-06-24 ENCOUNTER — HOSPITAL ENCOUNTER (OUTPATIENT)
Dept: CT IMAGING | Facility: HOSPITAL | Age: 55
Discharge: HOME | End: 2025-06-24
Payer: COMMERCIAL

## 2025-06-24 ENCOUNTER — HOSPITAL ENCOUNTER (OUTPATIENT)
Dept: MAMMOGRAPHY | Facility: HOSPITAL | Age: 55
Discharge: HOME | End: 2025-06-24
Payer: COMMERCIAL

## 2025-06-24 DIAGNOSIS — Z87.891 HISTORY OF NICOTINE DEPENDENCE: ICD-10-CM

## 2025-06-24 DIAGNOSIS — Z12.31 SCREENING MAMMOGRAM, ENCOUNTER FOR: ICD-10-CM

## 2025-06-24 PROCEDURE — 77067 SCR MAMMO BI INCL CAD: CPT

## 2025-06-24 PROCEDURE — 77063 BREAST TOMOSYNTHESIS BI: CPT

## 2025-06-24 PROCEDURE — 71271 CT THORAX LUNG CANCER SCR C-: CPT

## 2025-06-26 DIAGNOSIS — R12 HEARTBURN: ICD-10-CM

## 2025-06-26 RX ORDER — PANTOPRAZOLE SODIUM 40 MG/1
40 TABLET, DELAYED RELEASE ORAL DAILY
Qty: 30 TABLET | Refills: 1 | Status: SHIPPED | OUTPATIENT
Start: 2025-06-26

## 2025-07-21 RX ORDER — TRIAMTERENE AND HYDROCHLOROTHIAZIDE 37.5; 25 MG/1; MG/1
1 TABLET ORAL DAILY
Qty: 90 TABLET | Refills: 0 | Status: SHIPPED | OUTPATIENT
Start: 2025-07-21

## 2025-07-21 RX ORDER — NORTRIPTYLINE HYDROCHLORIDE 10 MG/1
CAPSULE ORAL
Qty: 180 CAPSULE | Refills: 3 | Status: SHIPPED | OUTPATIENT
Start: 2025-07-21

## 2025-08-11 ENCOUNTER — TELEMEDICINE (OUTPATIENT)
Dept: PSYCHIATRY | Facility: CLINIC | Age: 55
End: 2025-08-11
Payer: COMMERCIAL

## 2025-08-11 DIAGNOSIS — F41.0 GENERALIZED ANXIETY DISORDER WITH PANIC ATTACKS: Primary | ICD-10-CM

## 2025-08-11 DIAGNOSIS — Z79.899 ENCOUNTER FOR LONG-TERM (CURRENT) USE OF MEDICATIONS: ICD-10-CM

## 2025-08-11 DIAGNOSIS — F33.1 MAJOR DEPRESSIVE DISORDER, RECURRENT EPISODE, MODERATE: ICD-10-CM

## 2025-08-11 DIAGNOSIS — F41.1 GENERALIZED ANXIETY DISORDER WITH PANIC ATTACKS: Primary | ICD-10-CM

## 2025-08-11 PROCEDURE — 96127 BRIEF EMOTIONAL/BEHAV ASSMT: CPT | Performed by: NURSE PRACTITIONER

## 2025-08-11 PROCEDURE — 99214 OFFICE O/P EST MOD 30 MIN: CPT | Performed by: NURSE PRACTITIONER

## 2025-08-11 RX ORDER — ALPRAZOLAM 0.25 MG
0.25 TABLET ORAL 2 TIMES DAILY PRN
Qty: 60 TABLET | Refills: 0 | Status: SHIPPED | OUTPATIENT
Start: 2025-08-11

## 2025-08-20 ENCOUNTER — OFFICE VISIT (OUTPATIENT)
Dept: FAMILY MEDICINE CLINIC | Facility: CLINIC | Age: 55
End: 2025-08-20
Payer: COMMERCIAL

## 2025-08-20 VITALS
HEART RATE: 75 BPM | DIASTOLIC BLOOD PRESSURE: 62 MMHG | HEIGHT: 62 IN | SYSTOLIC BLOOD PRESSURE: 120 MMHG | TEMPERATURE: 98 F | OXYGEN SATURATION: 100 % | BODY MASS INDEX: 27.68 KG/M2 | WEIGHT: 150.4 LBS

## 2025-08-20 DIAGNOSIS — M51.362 DEGENERATION OF INTERVERTEBRAL DISC OF LUMBAR REGION WITH DISCOGENIC BACK PAIN AND LOWER EXTREMITY PAIN: ICD-10-CM

## 2025-08-20 DIAGNOSIS — M54.50 CHRONIC BILATERAL LOW BACK PAIN, UNSPECIFIED WHETHER SCIATICA PRESENT: Primary | ICD-10-CM

## 2025-08-20 DIAGNOSIS — G89.29 CHRONIC BILATERAL LOW BACK PAIN, UNSPECIFIED WHETHER SCIATICA PRESENT: Primary | ICD-10-CM

## 2025-08-20 PROCEDURE — 99214 OFFICE O/P EST MOD 30 MIN: CPT | Performed by: FAMILY MEDICINE

## 2025-08-20 RX ORDER — PREDNISONE 20 MG/1
TABLET ORAL
Qty: 20 TABLET | Refills: 0 | Status: SHIPPED | OUTPATIENT
Start: 2025-08-20

## 2025-08-22 ENCOUNTER — LAB (OUTPATIENT)
Dept: LAB | Facility: HOSPITAL | Age: 55
End: 2025-08-22
Payer: COMMERCIAL

## (undated) DEVICE — 1 ML TUBERCULIN SYRINGE REGULAR TIP: Brand: MONOJECT

## (undated) DEVICE — TUBING, SUCTION, 1/4" X 10', STRAIGHT: Brand: MEDLINE

## (undated) DEVICE — CONN TBG Y 5 IN 1 LF STRL

## (undated) DEVICE — SHEET, DRAPE, SPLIT, STERILE: Brand: MEDLINE

## (undated) DEVICE — SNAR POLYP SENSATION STDOVL 27 240 BX40

## (undated) DEVICE — DISPOSABLE IRRIGATION BIPOLAR CORD, M1000 TYPE: Brand: KIRWAN

## (undated) DEVICE — DRSNG WND GZ PAD BORDERED 4X8IN STRL

## (undated) DEVICE — CANN NASL CO2 TRULINK W/O2 A/

## (undated) DEVICE — THE TORRENT IRRIGATION SCOPE CONNECTOR IS USED WITH THE TORRENT IRRIGATION TUBING TO PROVIDE IRRIGATION FLUIDS SUCH AS STERILE WATER DURING GASTROINTESTINAL ENDOSCOPIC PROCEDURES WHEN USED IN CONJUNCTION WITH AN IRRIGATION PUMP (OR ELECTROSURGICAL UNIT).: Brand: TORRENT

## (undated) DEVICE — SUT SILK 2/0 TIES 18IN A185H

## (undated) DEVICE — GLV SURG SENSICARE W/ALOE PF LF 7.5 STRL

## (undated) DEVICE — JACKSON-PRATT 100CC BULB RESERVOIR: Brand: CARDINAL HEALTH

## (undated) DEVICE — GLV SURG BIOGEL LTX PF 7

## (undated) DEVICE — NDL SPINE 18G 31/2IN PNK

## (undated) DEVICE — 6.0MM PRECISION ROUND

## (undated) DEVICE — DRP MICROSCP LEICA W/GLASS LENS

## (undated) DEVICE — DRN WND JP RND W TROC SIL 10F 1/8IN

## (undated) DEVICE — APPL CHLORAPREP W/TINT 10.5ML PERC STRL

## (undated) DEVICE — SINGLE-USE BIOPSY FORCEPS: Brand: RADIAL JAW 4

## (undated) DEVICE — ADHS SKIN DERMABOND TOP ADVANCED

## (undated) DEVICE — ANTIBACTERIAL UNDYED BRAIDED (POLYGLACTIN 910), SYNTHETIC ABSORBABLE SUTURE: Brand: COATED VICRYL

## (undated) DEVICE — LO CONTOUR COLLAR: Brand: DEROYAL

## (undated) DEVICE — Device

## (undated) DEVICE — SMOKE EVACUATION TUBING WITH 7/8 IN TO 1/4 IN REDUCER: Brand: BUFFALO FILTER

## (undated) DEVICE — Device: Brand: DEFENDO AIR/WATER/SUCTION AND BIOPSY VALVE

## (undated) DEVICE — CODMAN® SURGICAL PATTIES 3/4" X 3/4" (1.91CM X 1.91CM): Brand: CODMAN®

## (undated) DEVICE — PK NEURO SPINE 40

## (undated) DEVICE — 3.0MM NEURO (MATCH HEAD) LESS AGGRESSIVE

## (undated) DEVICE — COVER,TABLE,HEAVY DUTY,79"X110",STRL: Brand: MEDLINE

## (undated) DEVICE — SUT SILK 2/0 FS BLK 18IN 685G

## (undated) DEVICE — ELECTRD BLD EDGE/INSUL1P 2.4X5.1MM STRL